# Patient Record
Sex: FEMALE | Race: WHITE | NOT HISPANIC OR LATINO | Employment: UNEMPLOYED | URBAN - METROPOLITAN AREA
[De-identification: names, ages, dates, MRNs, and addresses within clinical notes are randomized per-mention and may not be internally consistent; named-entity substitution may affect disease eponyms.]

---

## 2018-03-31 ENCOUNTER — OFFICE VISIT (OUTPATIENT)
Dept: URGENT CARE | Facility: URGENT CARE | Age: 28
End: 2018-03-31
Payer: MEDICAID

## 2018-03-31 VITALS
DIASTOLIC BLOOD PRESSURE: 80 MMHG | TEMPERATURE: 97.6 F | BODY MASS INDEX: 52.35 KG/M2 | WEIGHT: 293 LBS | OXYGEN SATURATION: 96 % | HEART RATE: 82 BPM | SYSTOLIC BLOOD PRESSURE: 131 MMHG

## 2018-03-31 DIAGNOSIS — J40 BRONCHITIS: ICD-10-CM

## 2018-03-31 DIAGNOSIS — R31.9 URINARY TRACT INFECTION WITH HEMATURIA, SITE UNSPECIFIED: ICD-10-CM

## 2018-03-31 DIAGNOSIS — N39.0 URINARY TRACT INFECTION WITH HEMATURIA, SITE UNSPECIFIED: ICD-10-CM

## 2018-03-31 DIAGNOSIS — R30.0 DYSURIA: Primary | ICD-10-CM

## 2018-03-31 LAB
ALBUMIN UR-MCNC: NEGATIVE MG/DL
APPEARANCE UR: ABNORMAL
BACTERIA #/AREA URNS HPF: ABNORMAL /HPF
BILIRUB UR QL STRIP: NEGATIVE
COLOR UR AUTO: YELLOW
GLUCOSE UR STRIP-MCNC: NEGATIVE MG/DL
HGB UR QL STRIP: NEGATIVE
KETONES UR STRIP-MCNC: NEGATIVE MG/DL
LEUKOCYTE ESTERASE UR QL STRIP: ABNORMAL
NITRATE UR QL: NEGATIVE
NON-SQ EPI CELLS #/AREA URNS LPF: ABNORMAL /LPF
PH UR STRIP: 5.5 PH (ref 5–7)
RBC #/AREA URNS AUTO: ABNORMAL /HPF
SOURCE: ABNORMAL
SP GR UR STRIP: >1.03 (ref 1–1.03)
UROBILINOGEN UR STRIP-ACNC: 0.2 EU/DL (ref 0.2–1)
WBC #/AREA URNS AUTO: ABNORMAL /HPF

## 2018-03-31 PROCEDURE — 99214 OFFICE O/P EST MOD 30 MIN: CPT | Performed by: FAMILY MEDICINE

## 2018-03-31 PROCEDURE — 81001 URINALYSIS AUTO W/SCOPE: CPT | Performed by: FAMILY MEDICINE

## 2018-03-31 RX ORDER — CIPROFLOXACIN 250 MG/1
250 TABLET, FILM COATED ORAL 2 TIMES DAILY
Qty: 14 TABLET | Refills: 0 | Status: SHIPPED | OUTPATIENT
Start: 2018-03-31 | End: 2018-04-07

## 2018-03-31 NOTE — MR AVS SNAPSHOT
"              After Visit Summary   3/31/2018    Cheryle Vazquez    MRN: [id number]           Patient Information     Date Of Birth          1990        Visit Information        Provider Department      3/31/2018 1:30 PM Suhas Romo,  [facility]        Today's Diagnoses     Dysuria    -  1    Urinary tract infection with hematuria, site unspecified        Bronchitis           Follow-ups after your visit        Who to contact     If you have questions or need follow up information about today's clinic visit or your schedule please contact [facility] directly at [phone].  Normal or non-critical lab and imaging results will be communicated to you by [employer], letter or phone within 4 business days after the clinic has received the results. If you do not hear from us within 7 days, please contact the clinic through [employer] or phone. If you have a critical or abnormal lab result, we will notify you by phone as soon as possible.  Submit refill requests through [employer] or call your pharmacy and they will forward the refill request to us. Please allow 3 business days for your refill to be completed.          Additional Information About Your Visit        MyChart Information     [employer] lets you send messages to your doctor, view your test results, renew your prescriptions, schedule appointments and more. To sign up, go to [email or web address] . Click on \"Log in\" on the left side of the screen, which will take you to the Welcome page. Then click on \"Sign up Now\" on the right side of the page.     You will be asked to enter the access code listed below, as well as some personal information. Please follow the directions to create your username and password.     Your access code is: [email or web address]  Expires: 2018  2:27 PM     Your access code will  in 90 days. If you need help or a new code, please call your [place] clinic or " [phone].        Care EveryWhere ID     This is your Care EveryWhere ID. This could be used by other organizations to access your [place] medical records  [id number]        Your Vitals Were     Pulse Temperature Pulse Oximetry Breastfeeding? BMI (Body Mass Index)       82 97.6  F (36.4  C) (Oral) 96% No 52.35 kg/m2        Blood Pressure from Last 3 Encounters:   03/31/18 131/80   01/24/15 100/64   10/22/14 116/70    Weight from Last 3 Encounters:   03/31/18 305 lb (138.3 kg)   01/24/15 262 lb 8 oz (119.1 kg)   10/22/14 258 lb (117 kg)              We Performed the Following     UA with Microscopic reflex to Culture          Today's Medication Changes          These changes are accurate as of 3/31/18  2:27 PM.  If you have any questions, ask your nurse or doctor.               Start taking these medicines.        Dose/Directions    ciprofloxacin 250 MG tablet   Commonly known as:  CIPRO   Used for:  Urinary tract infection with hematuria, site unspecified   Started by:  Suhas Romo DO        Dose:  250 mg   Take 1 tablet (250 mg) by mouth 2 times daily for 7 days   Quantity:  14 tablet   Refills:  0         Stop taking these medicines if you haven't already. Please contact your care team if you have questions.     CONCERTA 36 MG CR tablet   Generic drug:  methylphenidate ER   Stopped by:  Suhas Romo DO           sulfamethoxazole-trimethoprim 800-160 MG per tablet   Commonly known as:  BACTRIM DS/SEPTRA DS   Stopped by:  Suhas Romo DO           valACYclovir 1000 mg tablet   Commonly known as:  VALTREX   Stopped by:  Suhas Romo DO                Where to get your medicines      These medications were sent to [facility] [id number] [place] [address] - [address] AT [facility]  [address], [facility] [address]     Phone:  [phone]     ciprofloxacin 250 MG tablet                Primary Care Provider Office Phone # Fax #    [facility] [phone]       [address]  [facility] [address]        Equal Access to Services     MAITE CABA : Hadii aad ku hadleonidas Otto, chidi brainlionel, marleny kamehul cox, irena martinez bryan mullen. So [facility] [phone].    ATENCIÓN: Si habla español, tiene a bear disposición servicios gratuitos de asistencia lingüística. Llame al [phone].    We comply with applicable federal civil rights laws and [place] laws. We do not discriminate on the basis of race, color, national origin, age, disability, sex, sexual orientation, or gender identity.            Thank you!     Thank you for choosing [facility]  for your care. Our goal is always to provide you with excellent care. Hearing back from our patients is one way we can continue to improve our services. Please take a few minutes to complete the written survey that you may receive in the mail after your visit with us. Thank you!             Your Updated Medication List - Protect others around you: Learn how to safely use, store and throw away your medicines at www.disposemymeds.org.          This list is accurate as of 3/31/18  2:27 PM.  Always use your most recent med list.                   Brand Name Dispense Instructions for use Diagnosis    ciprofloxacin 250 MG tablet    CIPRO    14 tablet    Take 1 tablet (250 mg) by mouth 2 times daily for 7 days    Urinary tract infection with hematuria, site unspecified       MIRENA (52 MG) 20 MCG/24HR IUD   Generic drug:  levonorgestrel      1 each by Intrauterine route once

## 2018-04-12 ENCOUNTER — OFFICE VISIT (OUTPATIENT)
Dept: OBGYN | Facility: CLINIC | Age: 28
End: 2018-04-12
Payer: MEDICAID

## 2018-04-12 VITALS
HEIGHT: 64 IN | DIASTOLIC BLOOD PRESSURE: 64 MMHG | BODY MASS INDEX: 50.02 KG/M2 | WEIGHT: 293 LBS | SYSTOLIC BLOOD PRESSURE: 110 MMHG | TEMPERATURE: 98.2 F

## 2018-04-12 DIAGNOSIS — Z30.430 ENCOUNTER FOR INSERTION OF MIRENA IUD: Primary | ICD-10-CM

## 2018-04-12 DIAGNOSIS — Z12.4 CERVICAL CANCER SCREENING: ICD-10-CM

## 2018-04-12 DIAGNOSIS — Z30.432 ENCOUNTER FOR IUD REMOVAL: ICD-10-CM

## 2018-04-12 DIAGNOSIS — Z11.3 ROUTINE SCREENING FOR STI (SEXUALLY TRANSMITTED INFECTION): ICD-10-CM

## 2018-04-12 PROCEDURE — G0145 SCR C/V CYTO,THINLAYER,RESCR: HCPCS | Performed by: ADVANCED PRACTICE MIDWIFE

## 2018-04-12 PROCEDURE — 58301 REMOVE INTRAUTERINE DEVICE: CPT | Performed by: ADVANCED PRACTICE MIDWIFE

## 2018-04-12 PROCEDURE — 87591 N.GONORRHOEAE DNA AMP PROB: CPT | Performed by: ADVANCED PRACTICE MIDWIFE

## 2018-04-12 PROCEDURE — 58300 INSERT INTRAUTERINE DEVICE: CPT | Performed by: ADVANCED PRACTICE MIDWIFE

## 2018-04-12 PROCEDURE — 87491 CHLMYD TRACH DNA AMP PROBE: CPT | Performed by: ADVANCED PRACTICE MIDWIFE

## 2018-04-12 NOTE — LETTER
April 20, 2018      Cheryle Vazquez  [address]  [place] [address]    Dear MsTripgreg,      I am happy to inform you that your recent cervical cancer screening test (PAP smear) was normal.      Preventative screenings such as this help to ensure your health for years to come. You should repeat a pap smear in 3 years, unless otherwise directed.      You will still need to return to the clinic every year for your annual exam and other preventive tests.     Please contact the clinic at [phone] if you have further questions.       Sincerely,      KENNA Reece CNM/javier

## 2018-04-12 NOTE — PROGRESS NOTES
INDICATIONS:                                                      Is a pregnancy test required: No.  Was a consent obtained?  Yes    Cheryle Vazquez is a 28 year old female,, No LMP recorded. Patient is not currently having periods (Reason: IUD). who presents today for IUD removal and insertion of a new IUD. Her current IUD was placed 5 years ago in May. She has not had problems with the IUD. She requests removal of the IUD because the IUD effectiveness has     The risks, benefits and alternatives of IUD insertion were discussed in detail previously. She also has reviewed the product brochure.  She has elected to go ahead with the removal and insertion of the new IUD today and her questions were answered. Consent was signed. Her LMP began on unknown does not bleed on Mirena and was normal in duration and amount of flow. A pregnancy test was performed today:  No    Today's PHQ-2 Score: No flowsheet data found.    PROCEDURE:                                                      A speculum exam was performed and the cervix was visualized. The IUD string was visualized. Using ring forceps, the string  was grasped and the IUD removed intact.    The pelvic exam revealed normal external genitalia. On bimanual exam the uterus was Anteverted and normal in size with no tenderness present. A speculum was inserted into the vagina and the cervix was visualized. The cervix was prepped with Betadine . The uterus sounded to 8 cm. A Mirena IUD was then inserted without difficulty. The string was cut to 3 cm.  The patient experienced a mild amount of cramping.      POST PROCEDURE:                                                      The patient tolerated the procedure well. Patient was discharged in stable condition.    Call if bleeding, pain or fever occur.    KENNA Reece    (Z30.430) Encounter for insertion of mirena IUD  (primary encounter diagnosis)  Plan: HC LEVONORGESTREL IU 52MG 5 YR,  levonorgestrel         (MIRENA) 20 MCG/24HR IUD, INSERTION         INTRAUTERINE DEVICE        Tolerated well     (Z30.432) Encounter for IUD removal  Plan: REMOVE INTRAUTERINE DEVICE        Tolerated well    (Z11.3) Routine screening for STI (sexually transmitted infection)  Comment: collected  Plan: Chlamydia trachomatis PCR, Neisseria         gonorrhoeae PCR        Results pending     (Z12.4) Cervical cancer screening  Comment: collected  Plan: PAP imaged thin layer screen        Results pending

## 2018-04-12 NOTE — MR AVS SNAPSHOT
"              After Visit Summary   4/12/2018    Cheryle Vazquez    MRN: [id number]           Patient Information     Date Of Birth          1990        Visit Information        Provider Department      4/12/2018 2:30 PM Minnie Arriaga APRN CNM [facility]        Today's Diagnoses     Encounter for insertion of mirena IUD    -  1    Encounter for IUD removal        Routine screening for STI (sexually transmitted infection)        Cervical cancer screening           Follow-ups after your visit        Follow-up notes from your care team     Return in about 1 month (around 5/12/2018), or if symptoms worsen or fail to improve.      Who to contact     If you have questions or need follow up information about today's clinic visit or your schedule please contact [facility] directly at [phone].  Normal or non-critical lab and imaging results will be communicated to you by MyChart, letter or phone within 4 business days after the clinic has received the results. If you do not hear from us within 7 days, please contact the clinic through MyChart or phone. If you have a critical or abnormal lab result, we will notify you by phone as soon as possible.  Submit refill requests through [employer] or call your pharmacy and they will forward the refill request to us. Please allow 3 business days for your refill to be completed.          Additional Information About Your Visit        MyChart Information     [employer] lets you send messages to your doctor, view your test results, renew your prescriptions, schedule appointments and more. To sign up, go to [email or web address] . Click on \"Log in\" on the left side of the screen, which will take you to the Welcome page. Then click on \"Sign up Now\" on the right side of the page.     You will be asked to enter the access code listed below, as well as some personal information. Please follow the directions to create your username and " "password.     Your access code is: [email or web address]  Expires: 2018  2:27 PM     Your access code will  in 90 days. If you need help or a new code, please call your [facility] or [phone].        Care EveryWhere ID     This is your Care EveryWhere ID. This could be used by other organizations to access your [facility] medical records  [id number]        Your Vitals Were     Temperature Height BMI (Body Mass Index)             98.2  F (36.8  C) (Oral) 5' 4\" (1.626 m) 53.62 kg/m2          Blood Pressure from Last 3 Encounters:   18 110/64   18 131/80   01/24/15 100/64    Weight from Last 3 Encounters:   18 312 lb 6.4 oz (141.7 kg)   18 305 lb (138.3 kg)   01/24/15 262 lb 8 oz (119.1 kg)              We Performed the Following     Chlamydia trachomatis PCR     HC LEVONORGESTREL IU 52MG 5 YR     INSERTION INTRAUTERINE DEVICE     Neisseria gonorrhoeae PCR     PAP imaged thin layer screen     REMOVE INTRAUTERINE DEVICE          Today's Medication Changes          These changes are accurate as of 18  4:28 PM.  If you have any questions, ask your nurse or doctor.               These medicines have changed or have updated prescriptions.        Dose/Directions    * MIRENA (52 MG) 20 MCG/24HR IUD   This may have changed:  Another medication with the same name was added. Make sure you understand how and when to take each.   Generic drug:  levonorgestrel   Changed by:  Minnie Arriaga APRN CNM        Dose:  1 each   1 each by Intrauterine route once   Refills:  0       * levonorgestrel 20 MCG/24HR IUD   Commonly known as:  MIRENA   This may have changed:  You were already taking a medication with the same name, and this prescription was added. Make sure you understand how and when to take each.   Used for:  Encounter for insertion of mirena IUD   Changed by:  Minnie Arriaga APRN CNM        Dose:  1 each   1 each (20 mcg) by Intrauterine route continuous   Refills:  0       * " Notice:  This list has 2 medication(s) that are the same as other medications prescribed for you. Read the directions carefully, and ask your doctor or other care provider to review them with you.         Where to get your medicines      Some of these will need a paper prescription and others can be bought over the counter.  Ask your nurse if you have questions.     You don't need a prescription for these medications     levonorgestrel 20 MCG/24HR IUD                Primary Care Provider Office Phone # Fax #    [facility] [phone] [address]  [place] [address]        Equal Access to Services     [facility]: Hadii shakira hess hadasho Sosaul, waaxda luqadaha, qaybta kaalmada adedenver, irena adams . So [facility] [phone].    ATENCIÓN: Si habla español, tiene a bear disposición servicios gratuitos de asistencia lingüística. Ll[facility] [phone].    We comply with applicable federal civil rights laws and [place] laws. We do not discriminate on the basis of race, color, national origin, age, disability, sex, sexual orientation, or gender identity.            Thank you!     Thank you for choosing [facility]  for your care. Our goal is always to provide you with excellent care. Hearing back from our patients is one way we can continue to improve our services. Please take a few minutes to complete the written survey that you may receive in the mail after your visit with us. Thank you!             Your Updated Medication List - Protect others around you: Learn how to safely use, store and throw away your medicines at www.disposemymeds.org.          This list is accurate as of 4/12/18  4:28 PM.  Always use your most recent med list.                   Brand Name Dispense Instructions for use Diagnosis    * MIRENA (52 MG) 20 MCG/24HR IUD   Generic drug:  levonorgestrel      1 each by Intrauterine route once        *  levonorgestrel 20 MCG/24HR IUD    MIRENA     1 each (20 mcg) by Intrauterine route continuous    Encounter for insertion of mirena IUD       * Notice:  This list has 2 medication(s) that are the same as other medications prescribed for you. Read the directions carefully, and ask your doctor or other care provider to review them with you.

## 2018-04-12 NOTE — NURSING NOTE
"Chief Complaint   Patient presents with     Contraception     Remove IUD placed 2013 Replace Mirena       Initial /64 (BP Location: Left arm, Patient Position: Chair, Cuff Size: Adult Large)  Temp 98.2  F (36.8  C) (Oral)  Ht 5' 4\" (1.626 m)  Wt 312 lb 6.4 oz (141.7 kg)  BMI 53.62 kg/m2 Estimated body mass index is 53.62 kg/(m^2) as calculated from the following:    Height as of this encounter: 5' 4\" (1.626 m).    Weight as of this encounter: 312 lb 6.4 oz (141.7 kg).  BP completed using cuff size: large        The following HM Due: pap smear        The following patient reported/Care Every where data was sent to:  P ABSTRACT QUALITY INITIATIVES [02224]        patient has appointment for today              "

## 2018-04-12 NOTE — LETTER
[facility]  [address], [address]  [place] [address]  [phone]        April 16, 2018    Cheryle Vazquez  [address]  [place] [address]            Dear Ms. Cheryle Vazquez:      The results of your recent GC Chlamydia were NORMAL.   Results for orders placed or performed in visit on 04/12/18   Chlamydia trachomatis PCR   Result Value Ref Range    Specimen Description Vagina     Chlamydia Trachomatis PCR Negative NEG^Negative   Neisseria gonorrhoeae PCR   Result Value Ref Range    Specimen Descrip Vagina     N Gonorrhea PCR Negative NEG^Negative      If you have any further questions or problems, please contact our office.    Sincerely,      KENNA Tom CNM

## 2018-04-13 ENCOUNTER — OFFICE VISIT (OUTPATIENT)
Dept: URGENT CARE | Facility: URGENT CARE | Age: 28
End: 2018-04-13
Payer: MEDICAID

## 2018-04-13 VITALS
HEART RATE: 80 BPM | TEMPERATURE: 97 F | BODY MASS INDEX: 53.19 KG/M2 | DIASTOLIC BLOOD PRESSURE: 71 MMHG | SYSTOLIC BLOOD PRESSURE: 108 MMHG | OXYGEN SATURATION: 96 % | RESPIRATION RATE: 18 BRPM | WEIGHT: 293 LBS

## 2018-04-13 DIAGNOSIS — R05.8 PRODUCTIVE COUGH: ICD-10-CM

## 2018-04-13 DIAGNOSIS — J45.909 REACTIVE AIRWAY DISEASE WITHOUT COMPLICATION, UNSPECIFIED ASTHMA SEVERITY, UNSPECIFIED WHETHER PERSISTENT: Primary | ICD-10-CM

## 2018-04-13 LAB
C TRACH DNA SPEC QL NAA+PROBE: NEGATIVE
N GONORRHOEA DNA SPEC QL NAA+PROBE: NEGATIVE
SPECIMEN SOURCE: NORMAL
SPECIMEN SOURCE: NORMAL

## 2018-04-13 PROCEDURE — 99214 OFFICE O/P EST MOD 30 MIN: CPT | Performed by: FAMILY MEDICINE

## 2018-04-13 RX ORDER — ALBUTEROL SULFATE 90 UG/1
2 AEROSOL, METERED RESPIRATORY (INHALATION) 4 TIMES DAILY PRN
Qty: 1 INHALER | Refills: 0 | Status: SHIPPED | OUTPATIENT
Start: 2018-04-13 | End: 2019-05-07

## 2018-04-13 RX ORDER — CEFUROXIME AXETIL 500 MG/1
500 TABLET ORAL 2 TIMES DAILY
Qty: 20 TABLET | Refills: 0 | Status: SHIPPED | OUTPATIENT
Start: 2018-04-13 | End: 2018-04-23

## 2018-04-13 RX ORDER — PREDNISONE 20 MG/1
20 TABLET ORAL 2 TIMES DAILY
Qty: 10 TABLET | Refills: 0 | Status: SHIPPED | OUTPATIENT
Start: 2018-04-13 | End: 2018-04-18

## 2018-04-13 NOTE — MR AVS SNAPSHOT
"              After Visit Summary   2018    Cheryle Vazquez    MRN: [id number]           Patient Information     Date Of Birth          1990        Visit Information        Provider Department      2018 9:40 AM Suhas Romo DO [facility]        Today's Diagnoses     Reactive airway disease without complication, unspecified asthma severity, unspecified whether persistent    -  1    Productive cough           Follow-ups after your visit        Who to contact     If you have questions or need follow up information about today's clinic visit or your schedule please contact [facility] directly at [phone].  Normal or non-critical lab and imaging results will be communicated to you by MyChart, letter or phone within 4 business days after the clinic has received the results. If you do not hear from us within 7 days, please contact the clinic through MyChart or phone. If you have a critical or abnormal lab result, we will notify you by phone as soon as possible.  Submit refill requests through [employer] or call your pharmacy and they will forward the refill request to us. Please allow 3 business days for your refill to be completed.          Additional Information About Your Visit        MyChart Information     [employer] lets you send messages to your doctor, view your test results, renew your prescriptions, schedule appointments and more. To sign up, go to [email or web address] . Click on \"Log in\" on the left side of the screen, which will take you to the Welcome page. Then click on \"Sign up Now\" on the right side of the page.     You will be asked to enter the access code listed below, as well as some personal information. Please follow the directions to create your username and password.     Your access code is: [email or web address]  Expires: 2018  2:27 PM     Your access code will  in 90 days. If you need help or a new code, please " call your [place] clinic or [phone].        Care EveryWhere ID     This is your Care EveryWhere ID. This could be used by other organizations to access your [facility] medical records  [id number]        Your Vitals Were     Pulse Temperature Respirations Pulse Oximetry BMI (Body Mass Index)       80 97  F (36.1  C) (Oral) 18 96% 53.19 kg/m2        Blood Pressure from Last 3 Encounters:   04/13/18 108/71   04/12/18 110/64   03/31/18 131/80    Weight from Last 3 Encounters:   04/13/18 309 lb 14.4 oz (140.6 kg)   04/12/18 312 lb 6.4 oz (141.7 kg)   03/31/18 305 lb (138.3 kg)              Today, you had the following     No orders found for display         Today's Medication Changes          These changes are accurate as of 4/13/18 10:08 AM.  If you have any questions, ask your nurse or doctor.               Start taking these medicines.        Dose/Directions    albuterol 108 (90 Base) MCG/ACT Inhaler   Commonly known as:  PROAIR HFA   Used for:  Reactive airway disease without complication, unspecified asthma severity, unspecified whether persistent   Started by:  Suhas Romo DO        Dose:  2 puff   Inhale 2 puffs into the lungs 4 times daily as needed for shortness of breath / dyspnea or wheezing   Quantity:  1 Inhaler   Refills:  0       cefuroxime 500 MG tablet   Commonly known as:  CEFTIN   Used for:  Productive cough   Started by:  Suhas Romo DO        Dose:  500 mg   Take 1 tablet (500 mg) by mouth 2 times daily for 10 days   Quantity:  20 tablet   Refills:  0       predniSONE 20 MG tablet   Commonly known as:  DELTASONE   Used for:  Reactive airway disease without complication, unspecified asthma severity, unspecified whether persistent   Started by:  Suhas Romo DO        Dose:  20 mg   Take 1 tablet (20 mg) by mouth 2 times daily for 5 days   Quantity:  10 tablet   Refills:  0            Where to get your medicines      These medications were sent to [id number] -  [place], [place] - [address] AT [facility] [address]  [address], [facility] [address]     Phone:  [phone]     albuterol 108 (90 Base) MCG/ACT Inhaler    cefuroxime 500 MG tablet    predniSONE 20 MG tablet                Primary Care Provider Office Phone # Fax #    [facility] [phone] [address]  [place] [address]        Equal Access to Services     COLLINS CABA : Hadii aad ku hadasho Soomaali, waaxda luqadaha, qaybta kaalmada adeegyada, waxay idiin hayaan adeeg kharaseverino adams . So [facility] [phone].    ATENCIÓN: Si habla español, tiene a bear disposición servicios gratuitos de asistencia lingüística. Ll[facility] [phone].    We comply with applicable federal civil rights laws and [place] laws. We do not discriminate on the basis of race, color, national origin, age, disability, sex, sexual orientation, or gender identity.            Thank you!     Thank you for choosing [facility]  for your care. Our goal is always to provide you with excellent care. Hearing back from our patients is one way we can continue to improve our services. Please take a few minutes to complete the written survey that you may receive in the mail after your visit with us. Thank you!             Your Updated Medication List - Protect others around you: Learn how to safely use, store and throw away your medicines at www.disposemymeds.org.          This list is accurate as of 4/13/18 10:08 AM.  Always use your most recent med list.                   Brand Name Dispense Instructions for use Diagnosis    albuterol 108 (90 Base) MCG/ACT Inhaler    PROAIR HFA    1 Inhaler    Inhale 2 puffs into the lungs 4 times daily as needed for shortness of breath / dyspnea or wheezing    Reactive airway disease without complication, unspecified asthma severity, unspecified whether persistent       cefuroxime 500 MG tablet     CEFTIN    20 tablet    Take 1 tablet (500 mg) by mouth 2 times daily for 10 days    Productive cough       * MIRENA (52 MG) 20 MCG/24HR IUD   Generic drug:  levonorgestrel      1 each by Intrauterine route once        * levonorgestrel 20 MCG/24HR IUD    MIRENA     1 each (20 mcg) by Intrauterine route continuous    Encounter for insertion of mirena IUD       predniSONE 20 MG tablet    DELTASONE    10 tablet    Take 1 tablet (20 mg) by mouth 2 times daily for 5 days    Reactive airway disease without complication, unspecified asthma severity, unspecified whether persistent       * Notice:  This list has 2 medication(s) that are the same as other medications prescribed for you. Read the directions carefully, and ask your doctor or other care provider to review them with you.

## 2018-04-13 NOTE — PROGRESS NOTES
SUBJECTIVE: Cheryle Vazquez is a 28 year old female presenting with a chief complaint of nasal congestion and cough .  Onset of symptoms was 2 week(s) ago.  Course of illness is same.    Severity moderate  Current and Associated symptoms: runny nose, stuffy nose and cough - productive  Treatment measures tried include Tylenol/Ibuprofen.  Predisposing factors include possible hx of asthma.    Past Medical History:   Diagnosis Date     ADHD (attention deficit hyperactivity disorder)      Depressive disorder        Past Surgical History:   Procedure Laterality Date     LIGATE ARTERY ETHMOID  2008       Family History   Problem Relation Age of Onset     Hypertension Paternal Grandmother      Asthma Mother        Social History   Substance Use Topics     Smoking status: Former Smoker     Packs/day: 0.25     Years: 0.50     Types: Cigarettes     Smokeless tobacco: Never Used      Comment: Trying to stop---Hasn't smoked for a week.     Alcohol use No     ROS:  SKIN: no rash  GI: no vomiting    OBJECTIVE:  /71  Pulse 80  Temp 97  F (36.1  C) (Oral)  Resp 18  Wt 309 lb 14.4 oz (140.6 kg)  SpO2 96%  BMI 53.19 kg/m2   GENERAL APPEARANCE: healthy, alert and no distress  EYES: EOMI,  PERRL, conjunctiva clear  HENT: ear canals and TM's normal.  Nose and mouth without ulcers, erythema or lesions  NECK: supple, nontender, no lymphadenopathy  RESP: lungs clear to auscultation - no rales, rhonchi or wheezes  CV: regular rates and rhythm, normal S1 S2, no murmur noted  SKIN: no suspicious lesions or rashes      ICD-10-CM    1. Reactive airway disease without complication, unspecified asthma severity, unspecified whether persistent J45.909 predniSONE (DELTASONE) 20 MG tablet     albuterol (PROAIR HFA) 108 (90 Base) MCG/ACT Inhaler   2. Productive cough R05 cefuroxime (CEFTIN) 500 MG tablet       Fluids/Rest, f/u if worse/not any better

## 2018-04-16 LAB
COPATH REPORT: NORMAL
PAP: NORMAL

## 2018-04-16 NOTE — PROGRESS NOTES
SUBJECTIVE: Cheryle Vazquez is a 28 year old female presenting with a chief complaint of nasal congestion, cough  and abd pain.  Onset of symptoms was day(s) ago.  Course of illness is same.    Severity moderate  Current and Associated symptoms: stuffy nose and cough - non-productive  Treatment measures tried include Tylenol/Ibuprofen.  Predisposing factors include None.    Past Medical History:   Diagnosis Date     ADHD (attention deficit hyperactivity disorder)      Depressive disorder        Past Surgical History:   Procedure Laterality Date     LIGATE ARTERY ETHMOID  2008       Family History   Problem Relation Age of Onset     Hypertension Paternal Grandmother      Asthma Mother        Social History   Substance Use Topics     Smoking status: Former Smoker     Packs/day: 0.25     Years: 0.50     Types: Cigarettes     Smokeless tobacco: Never Used      Comment: Trying to stop---Hasn't smoked for a week.     Alcohol use No     ROS:  SKIN: no rash  GI: no vomiting    OBJECTIVE:  /80  Pulse 82  Temp 97.6  F (36.4  C) (Oral)  Wt 305 lb (138.3 kg)  SpO2 96%  Breastfeeding? No  BMI 52.35 kg/m2   GENERAL APPEARANCE: healthy, alert and no distress  EYES: EOMI,  PERRL, conjunctiva clear  HENT: ear canals and TM's normal.  Nose and mouth without ulcers, erythema or lesions  NECK: supple, nontender, no lymphadenopathy  RESP: lungs clear to auscultation - no rales, rhonchi or wheezes  ABDOMEN:  soft, nontender, no HSM or masses and bowel sounds normal  SKIN: no suspicious lesions or rashes      ICD-10-CM    1. Dysuria R30.0 UA with Microscopic reflex to Culture   2. Urinary tract infection with hematuria, site unspecified N39.0 ciprofloxacin (CIPRO) 250 MG tablet    R31.9    3. Bronchitis J40        Fluids/Rest, f/u if worse/not any better

## 2018-10-12 ENCOUNTER — OFFICE VISIT (OUTPATIENT)
Dept: URGENT CARE | Facility: URGENT CARE | Age: 28
End: 2018-10-12
Payer: COMMERCIAL

## 2018-10-12 VITALS
OXYGEN SATURATION: 97 % | DIASTOLIC BLOOD PRESSURE: 66 MMHG | HEART RATE: 76 BPM | SYSTOLIC BLOOD PRESSURE: 112 MMHG | TEMPERATURE: 97.3 F | RESPIRATION RATE: 18 BRPM

## 2018-10-12 DIAGNOSIS — R30.0 DYSURIA: Primary | ICD-10-CM

## 2018-10-12 LAB
ALBUMIN UR-MCNC: NEGATIVE MG/DL
APPEARANCE UR: CLEAR
BACTERIA #/AREA URNS HPF: ABNORMAL /HPF
BILIRUB UR QL STRIP: NEGATIVE
COLOR UR AUTO: YELLOW
GLUCOSE UR STRIP-MCNC: NEGATIVE MG/DL
HGB UR QL STRIP: NEGATIVE
KETONES UR STRIP-MCNC: NEGATIVE MG/DL
LEUKOCYTE ESTERASE UR QL STRIP: ABNORMAL
MUCOUS THREADS #/AREA URNS LPF: PRESENT /LPF
NITRATE UR QL: NEGATIVE
NON-SQ EPI CELLS #/AREA URNS LPF: ABNORMAL /LPF
PH UR STRIP: 6.5 PH (ref 5–7)
RBC #/AREA URNS AUTO: ABNORMAL /HPF
SOURCE: ABNORMAL
SP GR UR STRIP: 1.02 (ref 1–1.03)
URNS CMNT MICRO: ABNORMAL
UROBILINOGEN UR STRIP-ACNC: 0.2 EU/DL (ref 0.2–1)
WBC #/AREA URNS AUTO: ABNORMAL /HPF

## 2018-10-12 PROCEDURE — 99213 OFFICE O/P EST LOW 20 MIN: CPT | Performed by: FAMILY MEDICINE

## 2018-10-12 PROCEDURE — 81001 URINALYSIS AUTO W/SCOPE: CPT | Performed by: FAMILY MEDICINE

## 2018-10-12 RX ORDER — NITROFURANTOIN 25; 75 MG/1; MG/1
100 CAPSULE ORAL 2 TIMES DAILY
Qty: 10 CAPSULE | Refills: 0 | Status: SHIPPED | OUTPATIENT
Start: 2018-10-12 | End: 2018-10-17

## 2018-10-12 NOTE — MR AVS SNAPSHOT
"              After Visit Summary   10/12/2018    Cheryle Vazquez    MRN: [id number]           Patient Information     Date Of Birth          1990        Visit Information        Provider Department      10/12/2018 9:25 AM James Porras MD [facility]        Today's Diagnoses     Dysuria    -  1      Care Instructions    Take medication twice a day for 5 days    Symptoms should improve within the next 2-3 days. If no improvement, call or return for recommendation    Drink at least 8 glasses of water a day    If you develop flank pain, fevers, nausea/vomiting call immediately for assistance              Follow-ups after your visit        Who to contact     If you have questions or need follow up information about today's clinic visit or your schedule please contact [facility] directly at [phone].  Normal or non-critical lab and imaging results will be communicated to you by Dealstreethart, letter or phone within 4 business days after the clinic has received the results. If you do not hear from us within 7 days, please contact the clinic through MyChart or phone. If you have a critical or abnormal lab result, we will notify you by phone as soon as possible.  Submit refill requests through light or call your pharmacy and they will forward the refill request to us. Please allow 3 business days for your refill to be completed.          Additional Information About Your Visit        MyChart Information     light lets you send messages to your doctor, view your test results, renew your prescriptions, schedule appointments and more. To sign up, go to [email or web address] . Click on \"Log in\" on the left side of the screen, which will take you to the Welcome page. Then click on \"Sign up Now\" on the right side of the page.     You will be asked to enter the access code listed below, as well as some personal information. Please follow the " directions to create your username and password.     Your access code is: [email or web address]  Expires: 1/10/2019 10:41 AM     Your access code will  in 90 days. If you need help or a new code, please call your [facility] or [phone].        Care EveryWhere ID     This is your Care EveryWhere ID. This could be used by other organizations to access your [facility] medical records  [id number]        Your Vitals Were     Pulse Temperature Respirations Pulse Oximetry          76 97.3  F (36.3  C) (Tympanic) 18 97%         Blood Pressure from Last 3 Encounters:   10/12/18 112/66   18 108/71   18 110/64    Weight from Last 3 Encounters:   18 309 lb 14.4 oz (140.6 kg)   18 312 lb 6.4 oz (141.7 kg)   18 305 lb (138.3 kg)              We Performed the Following     UA with Microscopic reflex to Culture          Today's Medication Changes          These changes are accurate as of 10/12/18 10:41 AM.  If you have any questions, ask your nurse or doctor.               Start taking these medicines.        Dose/Directions    nitroFURantoin (macrocrystal-monohydrate) 100 MG capsule   Commonly known as:  MACROBID   Used for:  Dysuria   Started by:  James Porras MD        Dose:  100 mg   Take 1 capsule (100 mg) by mouth 2 times daily for 5 days   Quantity:  10 capsule   Refills:  0            Where to get your medicines      These medications were sent to [facility] [id number] [place] [address] [address] AT [facility] & [address], [facility] [address]     Phone:  [phone]     nitroFURantoin (macrocrystal-monohydrate) 100 MG capsule                Primary Care Provider Office Phone # Fax #    [facility] [phone] [address] [facility] [address]  [facility] [address]        Equal Access to Services     MAITE CABA AH: Hadii shakira ku hadasho Soomaali, waaxda luqadaha, qaybta kaalmada adeegyada, waxay idiin hayaan adeeg  chinasorinseverino villafuerteaajad ah. So [facility] [phone].    ATENCIÓN: Si anishala jennifer, tiene a bear disposición servicios gratuitos de asistencia lingüística. Rubi vasques [phone].    We comply with applicable federal civil rights laws and [place] laws. We do not discriminate on the basis of race, color, national origin, age, disability, sex, sexual orientation, or gender identity.            Thank you!     Thank you for choosing [facility]  for your care. Our goal is always to provide you with excellent care. Hearing back from our patients is one way we can continue to improve our services. Please take a few minutes to complete the written survey that you may receive in the mail after your visit with us. Thank you!             Your Updated Medication List - Protect others around you: Learn how to safely use, store and throw away your medicines at www.disposemymeds.org.          This list is accurate as of 10/12/18 10:41 AM.  Always use your most recent med list.                   Brand Name Dispense Instructions for use Diagnosis    albuterol 108 (90 Base) MCG/ACT inhaler    PROAIR HFA    1 Inhaler    Inhale 2 puffs into the lungs 4 times daily as needed for shortness of breath / dyspnea or wheezing    Reactive airway disease without complication, unspecified asthma severity, unspecified whether persistent       * MIRENA (52 MG) 20 MCG/24HR IUD   Generic drug:  levonorgestrel      1 each by Intrauterine route once        * levonorgestrel 20 MCG/24HR IUD    MIRENA     1 each (20 mcg) by Intrauterine route continuous    Encounter for insertion of mirena IUD       nitroFURantoin (macrocrystal-monohydrate) 100 MG capsule    MACROBID    10 capsule    Take 1 capsule (100 mg) by mouth 2 times daily for 5 days    Dysuria       * Notice:  This list has 2 medication(s) that are the same as other medications prescribed for you. Read the directions carefully, and ask your doctor or other care provider to review  them with you.

## 2018-10-12 NOTE — PROGRESS NOTES
Subjective:   Cheryle Vazquez is a 28 year old female who presents for   Chief Complaint   Patient presents with     Urgent Care     Uti sxs 4x days      4 days of symptoms: frequent urination, slight burning with urination.   No recent UTI. Has had them in the past. Her water intake is not as good as it should be.   No n/v/d. No fevers. No flank discomfort. Denies T2DM    PMHX/PSHX/MEDS/ALLERGIES/SHX/FHX reviewed in Epic.    Patient Active Problem List    Diagnosis Date Noted     Insertion of mirena IUD 04/12/2018     Priority: Medium     Lot # [id number]  Exp. Date (last month of insertion) 06/2020  NDC # 96088-598-35  REMOVE BY 04/2023         Depressed 05/29/2013     Priority: Medium       Current Outpatient Prescriptions   Medication     levonorgestrel (MIRENA, 52 MG,) 20 MCG/24HR IUD     nitroFURantoin, macrocrystal-monohydrate, (MACROBID) 100 MG capsule     albuterol (PROAIR HFA) 108 (90 Base) MCG/ACT Inhaler     levonorgestrel (MIRENA) 20 MCG/24HR IUD     No current facility-administered medications for this visit.      Facility-Administered Medications Ordered in Other Visits   Medication     fentaNYL (SUBLIMAZE) injection     lidocaine-EPINEPHrine 1.5 %-1:950715 injection     ropivacaine (NAROPIN) epidural       ROS:  As above per HPI    Objective:   /66  Pulse 76  Temp 97.3  F (36.3  C) (Tympanic)  Resp 18  SpO2 97%, There is no height or weight on file to calculate BMI.  Gen:  NAD, well-nourished, sitting in chair comfortably, very pleasant.   HEENT: EOMI, sclera anicteric, Head normocephalic, ; nares patent; moist mucous membranes  Neck: trachea midline, no thyromegaly  CV:  Hemodynamically stable  Pulm:  no increased work of breathing   ABD: soft, non-distended  Extrem: no cyanosis, edema or clubbing  Skin: no obvious rashes or abnormalities  Psych: Euthymic, linear thoughts, normal rate of speech    Results for orders placed or performed in visit on 10/12/18   UA with Microscopic reflex  to Culture   Result Value Ref Range    Color Urine Yellow     Appearance Urine Clear     Glucose Urine Negative NEG^Negative mg/dL    Bilirubin Urine Negative NEG^Negative    Ketones Urine Negative NEG^Negative mg/dL    Specific Gravity Urine 1.025 1.003 - 1.035    pH Urine 6.5 5.0 - 7.0 pH    Protein Albumin Urine Negative NEG^Negative mg/dL    Urobilinogen Urine 0.2 0.2 - 1.0 EU/dL    Nitrite Urine Negative NEG^Negative    Blood Urine Negative NEG^Negative    Leukocyte Esterase Urine Trace (A) NEG^Negative    Source Midstream Urine     WBC Urine 0 - 5 OTO5^0 - 5 /HPF    RBC Urine 2-5 (A) OTO2^O - 2 /HPF    Squamous Epithelial /LPF Urine Many (A) FEW^Few /LPF    Bacteria Urine Few (A) NEG^Negative /HPF    Mucous Urine Present (A) NEG^Negative /LPF    Comment Urine SHORT SAMPLE MICRO DONE ON UNSPUN URINE      Assessment & Plan:   Cheryle Vazquez, 28 year old female who presents with:  Dysuria  Although UA not convincing for UTI guidelines suggest treating based off symptoms. No signs of pyelonephritis. 5 day macrobid course provided.   - UA with Microscopic reflex to Culture    James Porras MD   [facility]     Options for treatment and/or follow-up care were reviewed with the patient. Cheryle Vazquez and/or legal guardian was engaged and actively involved in the decision making process. Patient/guardian verbalized understanding of the options discussed and was satisfied with the final plan.

## 2018-11-24 ENCOUNTER — OFFICE VISIT (OUTPATIENT)
Dept: URGENT CARE | Facility: URGENT CARE | Age: 28
End: 2018-11-24
Payer: COMMERCIAL

## 2018-11-24 VITALS
TEMPERATURE: 98.9 F | WEIGHT: 293 LBS | HEART RATE: 86 BPM | SYSTOLIC BLOOD PRESSURE: 112 MMHG | DIASTOLIC BLOOD PRESSURE: 68 MMHG | OXYGEN SATURATION: 97 % | BODY MASS INDEX: 56.59 KG/M2

## 2018-11-24 DIAGNOSIS — N39.0 URINARY TRACT INFECTION WITHOUT HEMATURIA, SITE UNSPECIFIED: Primary | ICD-10-CM

## 2018-11-24 DIAGNOSIS — E66.01 MORBID OBESITY (H): ICD-10-CM

## 2018-11-24 DIAGNOSIS — R39.15 URINARY URGENCY: ICD-10-CM

## 2018-11-24 LAB
ALBUMIN UR-MCNC: NEGATIVE MG/DL
APPEARANCE UR: ABNORMAL
BACTERIA #/AREA URNS HPF: ABNORMAL /HPF
BILIRUB UR QL STRIP: NEGATIVE
COLOR UR AUTO: YELLOW
GLUCOSE UR STRIP-MCNC: NEGATIVE MG/DL
HGB UR QL STRIP: ABNORMAL
KETONES UR STRIP-MCNC: NEGATIVE MG/DL
LEUKOCYTE ESTERASE UR QL STRIP: ABNORMAL
NITRATE UR QL: NEGATIVE
NON-SQ EPI CELLS #/AREA URNS LPF: ABNORMAL /LPF
PH UR STRIP: 7 PH (ref 5–7)
RBC #/AREA URNS AUTO: ABNORMAL /HPF
SOURCE: ABNORMAL
SP GR UR STRIP: 1.02 (ref 1–1.03)
UROBILINOGEN UR STRIP-ACNC: 0.2 EU/DL (ref 0.2–1)
WBC #/AREA URNS AUTO: ABNORMAL /HPF

## 2018-11-24 PROCEDURE — 81001 URINALYSIS AUTO W/SCOPE: CPT | Performed by: FAMILY MEDICINE

## 2018-11-24 PROCEDURE — 99213 OFFICE O/P EST LOW 20 MIN: CPT | Performed by: FAMILY MEDICINE

## 2018-11-24 PROCEDURE — 87086 URINE CULTURE/COLONY COUNT: CPT | Performed by: FAMILY MEDICINE

## 2018-11-24 RX ORDER — CEPHALEXIN 500 MG/1
500 CAPSULE ORAL 2 TIMES DAILY
Qty: 20 CAPSULE | Refills: 0 | Status: SHIPPED | OUTPATIENT
Start: 2018-11-24 | End: 2019-05-07

## 2018-11-24 RX ORDER — PHENAZOPYRIDINE HYDROCHLORIDE 200 MG/1
200 TABLET, FILM COATED ORAL 3 TIMES DAILY PRN
Qty: 6 TABLET | Refills: 0 | Status: SHIPPED | OUTPATIENT
Start: 2018-11-24 | End: 2019-05-07

## 2018-11-24 NOTE — PATIENT INSTRUCTIONS
Start  Cephalexin antibiotic for urine    Start pyridium for burning and pain, will turn urine bright orange    If fever, nausea;vomiting flank pain to ER    PREVENTION OF URINARY TRACT INFECTION    AVOID CHEMICAL IRRITTANTS: bath gels,  Perfumed products,  Deoderant pads or tampons, douching    CLOTHING that increases moisture and bacterial growth:  Nylon, lycra, pantihose, pantiliners     AVOID: tight clothing and thongs    ACIDIFY URINE: cranberry tablets instead of cranberry juice (with excess sugar) to acidify urine and decrease bacterial growth.     URINATE after intercourse    FLUIDS: 6-8 glasses water per day

## 2018-11-24 NOTE — PROGRESS NOTES
SUBJECTIVE:   Cheryle Vazquez is a 28 year old female who  presents today for a possible UTI. Symptoms of dysuria, urgency, frequency, burning, hesitancy, suprapubic pain and pressure, back pain, and voiding in small amounts have been going on for 2day(s)  Hematuria no.  sudden onset and worseningand moderate.  There is no history of fever, chills, nausea or vomiting.  No history of vaginaldischarge. This patient does  have a history of urinary tract infections. Patient denies rigors, flank pain, temperature > 101 degrees F. and Vomiting, significant nausea or diarrhea or vaginal discharge, vaginal odor, vaginal itching and dyspareunia (pain in labia/pelvis)     Past Medical History:   Diagnosis Date     ADHD (attention deficit hyperactivity disorder)      Depressive disorder      Current Outpatient Prescriptions   Medication Sig Dispense Refill     albuterol (PROAIR HFA) 108 (90 Base) MCG/ACT Inhaler Inhale 2 puffs into the lungs 4 times daily as needed for shortness of breath / dyspnea or wheezing 1 Inhaler 0     levonorgestrel (MIRENA) 20 MCG/24HR IUD 1 each (20 mcg) by Intrauterine route continuous (Patient not taking: Reported on 4/13/2018)       levonorgestrel (MIRENA, 52 MG,) 20 MCG/24HR IUD 1 each by Intrauterine route once       Social History   Substance Use Topics     Smoking status: Former Smoker     Packs/day: 0.25     Years: 0.50     Types: Cigarettes     Smokeless tobacco: Never Used      Comment: Trying to stop---Hasn't smoked for a week.     Alcohol use No       ROS:   Review of systems negative except as stated above.    OBJECTIVE:  /68  Pulse 86  Temp 98.9  F (37.2  C) (Oral)  Wt 329 lb 11.2 oz (149.6 kg)  SpO2 97%  BMI 56.59 kg/m2  GENERAL APPEARANCE: healthy, alert and no distress  RESP: lungs clear to auscultation - no rales, rhonchi or wheezes  CV: regular rates and rhythm, normal S1 S2, no murmur noted  ABDOMEN:  soft, nontender, no HSM or masses and bowel sounds  normal  BACK: No CVA tenderness  SKIN: no suspicious lesions or rashes  NEURO: Normal strength and tone, sensory exam grossly normal,  normal speech and mentation    ASSESSMENT/PLAN:      ICD-10-CM    1. Urinary tract infection without hematuria, site unspecified N39.0 Urine Culture Aerobic Bacterial     cephALEXin (KEFLEX) 500 MG capsule     phenazopyridine (PYRIDIUM) 200 MG tablet   2. Urinary urgency R39.15 UA with Microscopic reflex to Culture   3. Morbid obesity (H) E66.01        Patient Instructions   Start  Cephalexin antibiotic for urine    Start pyridium for burning and pain, will turn urine bright orange    If fever, nausea;vomiting flank pain to ER    PREVENTION OF URINARY TRACT INFECTION    AVOID CHEMICAL IRRITTANTS: bath gels,  Perfumed products,  Deoderant pads or tampons, douching    CLOTHING that increases moisture and bacterial growth:  Nylon, lycra, pantihose, pantiliners     AVOID: tight clothing and thongs    ACIDIFY URINE: cranberry tablets instead of cranberry juice (with excess sugar) to acidify urine and decrease bacterial growth.     URINATE after intercourse    FLUIDS: 6-8 glasses water per day

## 2018-11-24 NOTE — MR AVS SNAPSHOT
After Visit Summary   11/24/2018    Cheryle Vazquez    MRN: [id number]           Patient Information     Date Of Birth          1990        Visit Information        Provider Department      11/24/2018 2:50 PM Esthela Hughes MD [facility]        Today's Diagnoses     Urinary tract infection without hematuria, site unspecified    -  1    Urinary urgency        Morbid obesity (H)          Care Instructions    Start  Cephalexin antibiotic for urine    Start pyridium for burning and pain, will turn urine bright orange    If fever, nausea;vomiting flank pain to ER    PREVENTION OF URINARY TRACT INFECTION    AVOID CHEMICAL IRRITTANTS: bath gels,  Perfumed products,  Deoderant pads or tampons, douching    CLOTHING that increases moisture and bacterial growth:  Nylon, lycra, pantihose, pantiliners     AVOID: tight clothing and thongs    ACIDIFY URINE: cranberry tablets instead of cranberry juice (with excess sugar) to acidify urine and decrease bacterial growth.     URINATE after intercourse    FLUIDS: 6-8 glasses water per day          Follow-ups after your visit        Who to contact     If you have questions or need follow up information about today's clinic visit or your schedule please contact [facility] directly at [phone].  Normal or non-critical lab and imaging results will be communicated to you by MyChart, letter or phone within 4 business days after the clinic has received the results. If you do not hear from us within 7 days, please contact the clinic through MyChart or phone. If you have a critical or abnormal lab result, we will notify you by phone as soon as possible.  Submit refill requests through Wize or call your pharmacy and they will forward the refill request to us. Please allow 3 business days for your refill to be completed.          Additional Information About Your Visit        MyChart Information      "Tapru lets you send messages to your doctor, view your test results, renew your prescriptions, schedule appointments and more. To sign up, go to [email or web address] . Click on \"Log in\" on the left side of the screen, which will take you to the Welcome page. Then click on \"Sign up Now\" on the right side of the page.     You will be asked to enter the access code listed below, as well as some personal information. Please follow the directions to create your username and password.     Your access code is: [email or web address]  Expires: 1/10/2019  9:41 AM     Your access code will  in 90 days. If you need help or a new code, please call your [facility] clinic or [phone].        Care EveryWhere ID     This is your Care EveryWhere ID. This could be used by other organizations to access your [facility] medical records  [id number]        Your Vitals Were     Pulse Temperature Pulse Oximetry BMI (Body Mass Index)          86 98.9  F (37.2  C) (Oral) 97% 56.59 kg/m2         Blood Pressure from Last 3 Encounters:   18 112/68   10/12/18 112/66   18 108/71    Weight from Last 3 Encounters:   18 329 lb 11.2 oz (149.6 kg)   18 309 lb 14.4 oz (140.6 kg)   18 312 lb 6.4 oz (141.7 kg)              We Performed the Following     UA with Microscopic reflex to Culture     Urine Culture Aerobic Bacterial          Today's Medication Changes          These changes are accurate as of 18  5:13 PM.  If you have any questions, ask your nurse or doctor.               Start taking these medicines.        Dose/Directions    cephALEXin 500 MG capsule   Commonly known as:  KEFLEX   Used for:  Urinary tract infection without hematuria, site unspecified   Started by:  Esthela Hughes MD        Dose:  500 mg   Take 1 capsule (500 mg) by mouth 2 times daily   Quantity:  20 capsule   Refills:  0       phenazopyridine 200 MG tablet   Commonly known as:  PYRIDIUM   Used for:  Urinary tract infection without " hematuria, site unspecified   Started by:  Esthela Hughes MD        Dose:  200 mg   Take 1 tablet (200 mg) by mouth 3 times daily as needed for irritation   Quantity:  6 tablet   Refills:  0            Where to get your medicines      These [facility] to [id number] - [place], [place] - [address] AT [facility] [address], [facility] [address]     Phone:  [phone]     cephALEXin 500 MG capsule    phenazopyridine 200 MG tablet                Primary Care Provider Office Phone # Fax #    [facility] [phone] [address] [facility] [address] [facility] [address]  [place] [address]        Equal Access to Services     MAITE CABA AH: Hadii aad ku hadasho Soomaali, waaxda luqadaha, qaybta kaalmada adeegyada, waxay idiin haybisin courtney mullen. So [facility] [phone].    ATENCIÓN: Si habla español, tiene a bear disposición servicios gratuitos de asistencia lingüística. [facility] [phone].    We comply with applicable federal civil rights laws and [place] laws. We do not discriminate on the basis of race, color, national origin, age, disability, sex, sexual orientation, or gender identity.            Thank you!     Thank you for choosing [facility]  for your care. Our goal is always to provide you with excellent care. Hearing back from our patients is one way we can continue to improve our services. Please take a few minutes to complete the written survey that you may receive in the mail after your visit with us. Thank you!             Your Updated Medication List - Protect others around you: Learn how to safely use, store and throw away your medicines at www.disposemymeds.org.          This list is accurate as of 11/24/18  5:13 PM.  Always use your most recent med list.                   Brand Name Dispense Instructions for use Diagnosis    albuterol 108 (90 Base) MCG/ACT inhaler    PROAIR HFA    1 Inhaler     Inhale 2 puffs into the lungs 4 times daily as needed for shortness of breath / dyspnea or wheezing    Reactive airway disease without complication, unspecified asthma severity, unspecified whether persistent       cephALEXin 500 MG capsule    KEFLEX    20 capsule    Take 1 capsule (500 mg) by mouth 2 times daily    Urinary tract infection without hematuria, site unspecified       * MIRENA (52 MG) 20 MCG/24HR IUD   Generic drug:  levonorgestrel      1 each by Intrauterine route once        * levonorgestrel 20 MCG/24HR IUD    MIRENA     1 each (20 mcg) by Intrauterine route continuous    Encounter for insertion of mirena IUD       phenazopyridine 200 MG tablet    PYRIDIUM    6 tablet    Take 1 tablet (200 mg) by mouth 3 times daily as needed for irritation    Urinary tract infection without hematuria, site unspecified       * Notice:  This list has 2 medication(s) that are the same as other medications prescribed for you. Read the directions carefully, and ask your doctor or other care provider to review them with you.

## 2018-11-25 LAB
BACTERIA SPEC CULT: NORMAL
SPECIMEN SOURCE: NORMAL

## 2018-12-17 ENCOUNTER — OFFICE VISIT (OUTPATIENT)
Dept: URGENT CARE | Facility: URGENT CARE | Age: 28
End: 2018-12-17
Payer: COMMERCIAL

## 2018-12-17 VITALS
SYSTOLIC BLOOD PRESSURE: 102 MMHG | TEMPERATURE: 98.7 F | BODY MASS INDEX: 55.99 KG/M2 | WEIGHT: 293 LBS | DIASTOLIC BLOOD PRESSURE: 80 MMHG | OXYGEN SATURATION: 97 % | HEART RATE: 79 BPM

## 2018-12-17 DIAGNOSIS — H69.93 DYSFUNCTION OF BOTH EUSTACHIAN TUBES: ICD-10-CM

## 2018-12-17 DIAGNOSIS — J34.89 NASAL DRAINAGE: ICD-10-CM

## 2018-12-17 DIAGNOSIS — J45.909 MILD ASTHMA WITHOUT COMPLICATION, UNSPECIFIED WHETHER PERSISTENT: ICD-10-CM

## 2018-12-17 DIAGNOSIS — J01.00 ACUTE MAXILLARY SINUSITIS, RECURRENCE NOT SPECIFIED: Primary | ICD-10-CM

## 2018-12-17 PROCEDURE — 99214 OFFICE O/P EST MOD 30 MIN: CPT | Performed by: PHYSICIAN ASSISTANT

## 2018-12-17 RX ORDER — FLUTICASONE PROPIONATE 50 MCG
1-2 SPRAY, SUSPENSION (ML) NASAL DAILY
Qty: 16 G | Refills: 0 | Status: SHIPPED | OUTPATIENT
Start: 2018-12-17 | End: 2019-05-07

## 2018-12-17 RX ORDER — ALBUTEROL SULFATE 90 UG/1
2 AEROSOL, METERED RESPIRATORY (INHALATION) EVERY 6 HOURS
Qty: 1 INHALER | Refills: 0 | Status: SHIPPED | OUTPATIENT
Start: 2018-12-17 | End: 2019-05-07

## 2018-12-17 RX ORDER — CEFDINIR 300 MG/1
300 CAPSULE ORAL 2 TIMES DAILY
Qty: 14 CAPSULE | Refills: 0 | Status: SHIPPED | OUTPATIENT
Start: 2018-12-17 | End: 2019-02-26

## 2018-12-17 NOTE — PROGRESS NOTES
SUBJECTIVE:   Cheryle Vazquez is a 28 year old female presenting with a chief complaint of chills, stuffy nose, cough - productive, wheezing, facial pain/pressure and body aches.  Onset of symptoms was 1 week(s) ago.  Course of illness is worsening.    Severity moderate  Current and Associated symptoms: chest congestion, wheezing  Treatment measures tried include OTC medications.  Predisposing factors include hx of recent infection and asthma like illness.    Past Medical History:   Diagnosis Date     ADHD (attention deficit hyperactivity disorder)      Depressive disorder         Allergies   Allergen Reactions     Amoxicillin Other (See Comments)     Caused yeast infection  Patient does not want to take Amoxicillin as it causes a yeast infection.  She has requested it be added to her allergy list.     Azithromycin Rash     Sulfamethoxazole-Trimethoprim Palpitations and Rash     Possible allergic reaction     Valacyclovir Palpitations and Rash     Possible allergic reaction     Family History   Problem Relation Age of Onset     Hypertension Paternal Grandmother      Asthma Mother          Social History     Tobacco Use     Smoking status: Former Smoker     Packs/day: 0.25     Years: 0.50     Pack years: 0.12     Types: Cigarettes     Smokeless tobacco: Never Used     Tobacco comment: Trying to stop---Hasn't smoked for a week.   Substance Use Topics     Alcohol use: No       ROS:  CONSTITUTIONAL:NEGATIVE for fever, chills, change in weight  INTEGUMENTARY/SKIN: NEGATIVE for worrisome rashes, moles or lesions  ENT: POSITIVE for sinus congestion, drainage, sinus pain  EYE: NEGATIVE for eye pain, redness  RESP:POSITIVE for cough-productive, Hx asthma and wheezing  CV: NEGATIVE for chest pain, palpitations or peripheral edema  GI: NEGATIVE for nausea, abdominal pain, heartburn, or change in bowel habits  : negative for and dysuria  MUSCULOSKELETAL: NEGATIVE for significant arthralgias or myalgia  NEURO: NEGATIVE  for weakness, dizziness or paresthesias    OBJECTIVE  :/80   Pulse 79   Temp 98.7  F (37.1  C) (Tympanic)   Wt 148 kg (326 lb 3.2 oz)   SpO2 97%   BMI 55.99 kg/m    GENERAL APPEARANCE: healthy, alert and no distress  EYES: EOMI,  PERRL, conjunctiva clear  HENT: TM's normal bilaterally, nasal turbinates erythematous, swollen, rhinorrhea purulent and maxillary sinus tenderness   NECK: supple, nontender, no lymphadenopathy  RESP: expiratory wheezes generalized  CV: regular rates and rhythm, normal S1 S2, no murmur noted  ABDOMEN:  soft, nontender, no HSM or masses and bowel sounds normal  NEURO: Normal strength and tone, sensory exam grossly normal,  normal speech and mentation  SKIN: no suspicious lesions or rashes    ASSESSMENT/PLAN      ICD-10-CM    1. Acute maxillary sinusitis, recurrence not specified J01.00 cefdinir (OMNICEF) 300 MG capsule   2. Dysfunction of both eustachian tubes H69.83 fluticasone (FLONASE) 50 MCG/ACT nasal spray   3. Nasal drainage J34.89 cefdinir (OMNICEF) 300 MG capsule   4. Mild asthma without complication, unspecified whether persistent J45.909 albuterol (PROVENTIL HFA) 108 (90 Base) MCG/ACT inhaler       Orders Placed This Encounter     Pseudoephedrine-DM-GG-APAP (DAYQUIL LIQUICAPS OR)     cefdinir (OMNICEF) 300 MG capsule     fluticasone (FLONASE) 50 MCG/ACT nasal spray     albuterol (PROVENTIL HFA) 108 (90 Base) MCG/ACT inhaler       Albuterol for asthma exacerbation  flonase for nasal congestion  Fluids, rest  Follow up with PCP as needed  See orders in Epic

## 2018-12-17 NOTE — LETTER
[facility]  [address] [address]  [phone]      December 17, 2018    RE:  Cheryle Vazquez                                                                                                                                                       [address] [address] [facility] [address]            To whom it may concern:    Cheryle MASHA Vazquez was seen in the urgent care today for an illness.      Sincerely,        Rolf Ko [facility]

## 2019-02-26 ENCOUNTER — OFFICE VISIT (OUTPATIENT)
Dept: URGENT CARE | Facility: URGENT CARE | Age: 29
End: 2019-02-26
Payer: COMMERCIAL

## 2019-02-26 VITALS
TEMPERATURE: 99.3 F | DIASTOLIC BLOOD PRESSURE: 80 MMHG | HEART RATE: 91 BPM | BODY MASS INDEX: 55.82 KG/M2 | WEIGHT: 293 LBS | OXYGEN SATURATION: 96 % | SYSTOLIC BLOOD PRESSURE: 102 MMHG

## 2019-02-26 DIAGNOSIS — R09.89 CHEST CONGESTION: ICD-10-CM

## 2019-02-26 DIAGNOSIS — J98.01 ACUTE BRONCHOSPASM: Primary | ICD-10-CM

## 2019-02-26 DIAGNOSIS — J34.89 SINUS PAIN: ICD-10-CM

## 2019-02-26 DIAGNOSIS — R05.8 PRODUCTIVE COUGH: ICD-10-CM

## 2019-02-26 PROCEDURE — 99214 OFFICE O/P EST MOD 30 MIN: CPT | Performed by: PHYSICIAN ASSISTANT

## 2019-02-26 RX ORDER — CEFDINIR 300 MG/1
300 CAPSULE ORAL 2 TIMES DAILY
Qty: 20 CAPSULE | Refills: 0 | Status: SHIPPED | OUTPATIENT
Start: 2019-02-26 | End: 2019-05-07

## 2019-02-26 RX ORDER — PREDNISONE 20 MG/1
20 TABLET ORAL 2 TIMES DAILY
Qty: 10 TABLET | Refills: 0 | Status: SHIPPED | OUTPATIENT
Start: 2019-02-26 | End: 2019-05-07

## 2019-02-26 RX ORDER — DEXTROMETHORPHAN POLISTIREX 30 MG/5ML
60 SUSPENSION ORAL 2 TIMES DAILY
Qty: 148 ML | Refills: 0 | Status: SHIPPED | OUTPATIENT
Start: 2019-02-26 | End: 2019-05-07

## 2019-02-26 RX ORDER — ALBUTEROL SULFATE 90 UG/1
2 AEROSOL, METERED RESPIRATORY (INHALATION) EVERY 6 HOURS
Qty: 1 INHALER | Refills: 0 | Status: SHIPPED | OUTPATIENT
Start: 2019-02-26 | End: 2019-05-07

## 2019-02-26 NOTE — PROGRESS NOTES
SUBJECTIVE:   Cheryle Vazquez is a 29 year old female presenting with a chief complaint of fever, chills, runny nose, stuffy nose, cough - non-productive and facial pain/pressure.  Onset of symptoms was 7 day(s) ago.  Course of illness is same.    Severity moderate  Current and Associated symptoms: runny nose, stuffy nose and cough - productive  Treatment measures tried include Tylenol/Ibuprofen.  Predisposing factors include recent illness .    Past Medical History:   Diagnosis Date     ADHD (attention deficit hyperactivity disorder)      Depressive disorder         Allergies   Allergen Reactions     Amoxicillin Other (See Comments)     Caused yeast infection  Patient does not want to take Amoxicillin as it causes a yeast infection.  She has requested it be added to her allergy list.     Azithromycin Rash     Sulfamethoxazole-Trimethoprim Palpitations and Rash     Possible allergic reaction     Valacyclovir Palpitations and Rash     Possible allergic reaction     Family History   Problem Relation Age of Onset     Hypertension Paternal Grandmother      Asthma Mother          Social History     Tobacco Use     Smoking status: Former Smoker     Packs/day: 0.25     Years: 0.50     Pack years: 0.12     Types: Cigarettes     Smokeless tobacco: Never Used     Tobacco comment: Trying to stop---Hasn't smoked for a week.   Substance Use Topics     Alcohol use: No       ROS:  CONSTITUTIONAL:NEGATIVE for fever, chills, change in weight  INTEGUMENTARY/SKIN: NEGATIVE for worrisome rashes, moles or lesions  ENT/MOUTH: POSITIVE for sore throat, sinus congestion  RESP:POSITIVE for cough-productive  CV: NEGATIVE for chest pain, palpitations or peripheral edema  GI: NEGATIVE for nausea, abdominal pain, heartburn, or change in bowel habits  MUSCULOSKELETAL: POSITIVE  for body aches  NEURO: NEGATIVE for weakness, dizziness or paresthesias    OBJECTIVE  :/80   Pulse 91   Temp 99.3  F (37.4  C) (Oral)   Wt 147.5 kg (325 lb  3.2 oz)   SpO2 96%   BMI 55.82 kg/m    GENERAL APPEARANCE: healthy, alert and no distress  HENT: TM's normal bilaterally, nasal turbinates erythematous, swollen, rhinorrhea green and maxillary sinus tenderness   NECK: supple, nontender, no lymphadenopathy  RESP: lungs clear to auscultation - no rales, rhonchi or wheezes  CV: regular rates and rhythm, normal S1 S2, no murmur noted  NEURO: Normal strength and tone, sensory exam grossly normal,  normal speech and mentation  SKIN: no suspicious lesions or rashes    ASSESSMENT/PLAN:    ICD-10-CM    1. Acute bronchospasm J98.01 albuterol (PROVENTIL HFA) 108 (90 Base) MCG/ACT inhaler     predniSONE (DELTASONE) 20 MG tablet   2. Chest congestion R09.89 cefdinir (OMNICEF) 300 MG capsule     dextromethorphan (DELSYM) 30 MG/5ML liquid   3. Productive cough R05 cefdinir (OMNICEF) 300 MG capsule   4. Sinus pain J34.89 predniSONE (DELTASONE) 20 MG tablet       Orders Placed This Encounter     cefdinir (OMNICEF) 300 MG capsule     albuterol (PROVENTIL HFA) 108 (90 Base) MCG/ACT inhaler     predniSONE (DELTASONE) 20 MG tablet     dextromethorphan (DELSYM) 30 MG/5ML liquid       Fluids, rest  Follow up with PCP as needed  See orders in Epic

## 2019-05-07 ENCOUNTER — OFFICE VISIT (OUTPATIENT)
Dept: FAMILY MEDICINE | Facility: CLINIC | Age: 29
End: 2019-05-07
Payer: COMMERCIAL

## 2019-05-07 VITALS
WEIGHT: 293 LBS | SYSTOLIC BLOOD PRESSURE: 110 MMHG | OXYGEN SATURATION: 96 % | DIASTOLIC BLOOD PRESSURE: 80 MMHG | HEART RATE: 85 BPM | RESPIRATION RATE: 20 BRPM | BODY MASS INDEX: 50.02 KG/M2 | HEIGHT: 64 IN | TEMPERATURE: 96.9 F

## 2019-05-07 DIAGNOSIS — F33.1 MODERATE EPISODE OF RECURRENT MAJOR DEPRESSIVE DISORDER (H): Primary | ICD-10-CM

## 2019-05-07 DIAGNOSIS — E66.01 MORBID OBESITY (H): ICD-10-CM

## 2019-05-07 DIAGNOSIS — F90.9 ATTENTION DEFICIT HYPERACTIVITY DISORDER (ADHD), UNSPECIFIED ADHD TYPE: ICD-10-CM

## 2019-05-07 DIAGNOSIS — F41.1 ANXIETY STATE: ICD-10-CM

## 2019-05-07 DIAGNOSIS — J45.20 MILD INTERMITTENT ASTHMA WITHOUT COMPLICATION: ICD-10-CM

## 2019-05-07 PROCEDURE — 99214 OFFICE O/P EST MOD 30 MIN: CPT | Performed by: FAMILY MEDICINE

## 2019-05-07 RX ORDER — ALBUTEROL SULFATE 90 UG/1
2 AEROSOL, METERED RESPIRATORY (INHALATION) EVERY 6 HOURS
Qty: 18 G | Refills: 0 | Status: SHIPPED | OUTPATIENT
Start: 2019-05-07 | End: 2019-06-02

## 2019-05-07 RX ORDER — CITALOPRAM HYDROBROMIDE 20 MG/1
20 TABLET ORAL DAILY
Qty: 30 TABLET | Refills: 0 | Status: SHIPPED | OUTPATIENT
Start: 2019-05-07 | End: 2019-06-02

## 2019-05-07 ASSESSMENT — ANXIETY QUESTIONNAIRES
3. WORRYING TOO MUCH ABOUT DIFFERENT THINGS: NEARLY EVERY DAY
7. FEELING AFRAID AS IF SOMETHING AWFUL MIGHT HAPPEN: NEARLY EVERY DAY
IF YOU CHECKED OFF ANY PROBLEMS ON THIS QUESTIONNAIRE, HOW DIFFICULT HAVE THESE PROBLEMS MADE IT FOR YOU TO DO YOUR WORK, TAKE CARE OF THINGS AT HOME, OR GET ALONG WITH OTHER PEOPLE: EXTREMELY DIFFICULT
1. FEELING NERVOUS, ANXIOUS, OR ON EDGE: NEARLY EVERY DAY
2. NOT BEING ABLE TO STOP OR CONTROL WORRYING: MORE THAN HALF THE DAYS
5. BEING SO RESTLESS THAT IT IS HARD TO SIT STILL: MORE THAN HALF THE DAYS
6. BECOMING EASILY ANNOYED OR IRRITABLE: NEARLY EVERY DAY
GAD7 TOTAL SCORE: 19

## 2019-05-07 ASSESSMENT — PATIENT HEALTH QUESTIONNAIRE - PHQ9
SUM OF ALL RESPONSES TO PHQ QUESTIONS 1-9: 23
5. POOR APPETITE OR OVEREATING: NEARLY EVERY DAY

## 2019-05-07 ASSESSMENT — MIFFLIN-ST. JEOR: SCORE: 2202.33

## 2019-05-07 NOTE — LETTER
My Asthma Action Plan  Name: Cheryle Vazquez   YOB: 1990  Date: 5/7/2019   My doctor: Katya Ovalle MD   My clinic: [facility] [address]        My Control Medicine: None  My Rescue Medicine: Albuterol (Proair/Ventolin/Proventil) inhaler prn   My Asthma Severity: intermittent  Avoid your asthma triggers: Patient is unaware of triggers  Enviromental            GREEN ZONE   Good Control    I feel good    No cough or wheeze    Can work, sleep and play without asthma symptoms       Take your asthma control medicine every day.     1. If exercise triggers your asthma, take your rescue medication    15 minutes before exercise or sports, and    During exercise if you have asthma symptoms  2. Spacer to use with inhaler: If you have a spacer, make sure to use it with your inhaler             YELLOW ZONE Getting Worse  I have ANY of these:    I do not feel good    Cough or wheeze    Chest feels tight    Wake up at night   1. Keep taking your Green Zone medications  2. Start taking your rescue medicine:    every 20 minutes for up to 1 hour. Then every 4 hours for 24-48 hours.  3. If you stay in the Yellow Zone for more than 12-24 hours, contact your doctor.  4. If you do not return to the Green Zone in 12-24 hours or you get worse, start taking your oral steroid medicine if prescribed by your provider.           RED ZONE Medical Alert - Get Help  I have ANY of these:    I feel awful    Medicine is not helping    Breathing getting harder    Trouble walking or talking    Nose opens wide to breathe       1. Take your rescue medicine NOW  2. If your provider has prescribed an oral steroid medicine, start taking it NOW  3. Call your doctor NOW  4. If you are still in the Red Zone after 20 minutes and you have not reached your doctor:    Take your rescue medicine again and    Call 911 or go to the emergency room right away    See your regular doctor within 2 weeks of an Emergency Room or  Urgent Care visit for follow-up treatment.          Annual Reminders:  Meet with Asthma Educator,  Flu Shot in the Fall, consider Pneumonia Vaccination for patients with asthma (aged 19 and older).    Pharmacy: [facility] [address] [place] [address] [address] AT [facility] [address]                      Asthma Triggers  How To Control Things That Make Your Asthma Worse    Triggers are things that make your asthma worse.  Look at the list below to help you find your triggers and what you can do about them.  You can help prevent asthma flare-ups by staying away from your triggers.      Trigger                                                          What you can do   Cigarette Smoke  Tobacco smoke can make asthma worse. Do not allow smoking in your home, car or around you.  Be sure no one smokes at a child s day care or school.  If you smoke, ask your health care provider for ways to help you quit.  Ask family members to quit too.  Ask your health care provider for a referral to Quit Plan to help you quit smoking, or call 7-688-961-PLAN.     Colds, Flu, Bronchitis  These are common triggers of asthma. Wash your hands often.  Don t touch your eyes, nose or mouth.  Get a flu shot every year.     Dust Mites  These are tiny bugs that live in cloth or carpet. They are too small to see. Wash sheets and blankets in hot water every week.   Encase pillows and mattress in dust mite proof covers.  Avoid having carpet if you can. If you have carpet, vacuum weekly.   Use a dust mask and HEPA vacuum.   Pollen and Outdoor Mold  Some people are allergic to trees, grass, or weed pollen, or molds. Try to keep your windows closed.  Limit time out doors when pollen count is high.   Ask you health care provider about taking medicine during allergy season.     Animal Dander  Some people are allergic to skin flakes, urine or saliva from pets with fur or feathers. Keep pets with fur or feathers out of your home.     If you can t keep the pet outdoors, then keep the pet out of your bedroom.  Keep the bedroom door closed.  Keep pets off cloth furniture and away from stuffed toys.     Mice, Rats, and Cockroaches  Some people are allergic to the waste from these pests.   Cover food and garbage.  Clean up spills and food crumbs.  Store grease in the refrigerator.   Keep food out of the bedroom.   Indoor Mold  This can be a trigger if your home has high moisture. Fix leaking faucets, pipes, or other sources of water.   Clean moldy surfaces.  Dehumidify basement if it is damp and smelly.   Smoke, Strong Odors, and Sprays  These can reduce air quality. Stay away from strong odors and sprays, such as perfume, powder, hair spray, paints, smoke incense, paint, cleaning products, candles and new carpet.   Exercise or Sports  Some people with asthma have this trigger. Be active!  Ask your doctor about taking medicine before sports or exercise to prevent symptoms.    Warm up for 5-10 minutes before and after sports or exercise.     Other Triggers of Asthma  Cold air:  Cover your nose and mouth with a scarf.  Sometimes laughing or crying can be a trigger.  Some medicines and food can trigger asthma.

## 2019-05-07 NOTE — LETTER
My Depression Action Plan  Name: Cheryle Vazquez   Date of Birth 1990  Date: 5/7/2019    My doctor: Carolee Clark [facility]   [facility]  [address] [address]  [phone]          GREEN    ZONE   Good Control    What it looks like:     Things are going generally well. You have normal up s and down s. You may even feel depressed from time to time, but bad moods usually last less than a day.   What you need to do:  1. Continue to care for yourself (see self care plan)  2. Check your depression survival kit and update it as needed  3. Follow your physician s recommendations including any medication.  4. Do not stop taking medication unless you consult with your physician first.           YELLOW         ZONE Getting Worse    What it looks like:     Depression is starting to interfere with your life.     It may be hard to get out of bed; you may be starting to isolate yourself from others.    Symptoms of depression are starting to last most all day and this has happened for several days.     You may have suicidal thoughts but they are not constant.   What you need to do:     1. Call your care team, your response to treatment will improve if you keep your care team informed of your progress. Yellow periods are signs an adjustment may need to be made.     2. Continue your self-care, even if you have to fake it!    3. Talk to someone in your support network    4. Open up your depression survival kit           RED    ZONE Medical Alert - Get Help    What it looks like:     Depression is seriously interfering with your life.     You may experience these or other symptoms: You can t get out of bed most days, can t work or engage in other necessary activities, you have trouble taking care of basic hygiene, or basic responsibilities, thoughts of suicide or death that will not go away, self-injurious  behavior.     What you need to do:  1. Call your care team and request a same-day appointment. If they are not available (weekends or after hours) call your local crisis line, emergency room or 911.            Depression Self Care Plan / Survival Kit    Self-Care for Depression  Here s the deal. Your body and mind are really not as separate as most people think.  What you do and think affects how you feel and how you feel influences what you do and think. This means if you do things that people who feel good do, it will help you feel better.  Sometimes this is all it takes.  There is also a place for medication and therapy depending on how severe your depression is, so be sure to consult with your medical provider and/ or Behavioral Health Consultant if your symptoms are worsening or not improving.     In order to better manage my stress, I will:    Exercise  Get some form of exercise, every day. This will help reduce pain and release endorphins, the  feel good  chemicals in your brain. This is almost as good as taking antidepressants!  This is not the same as joining a gym and then never going! (they count on that by the way ) It can be as simple as just going for a walk or doing some gardening, anything that will get you moving.      Hygiene   Maintain good hygiene (Get out of bed in the morning, Make your bed, Brush your teeth, Take a shower, and Get dressed like you were going to work, even if you are unemployed).  If your clothes don't fit try to get ones that do.    Diet  I will strive to eat foods that are good for me, drink plenty of water, and avoid excessive sugar, caffeine, alcohol, and other mood-altering substances.  Some foods that are helpful in depression are: complex carbohydrates, B vitamins, flaxseed, fish or fish oil, fresh fruits and vegetables.    Psychotherapy  I agree to participate in Individual Therapy (if recommended).    Medication  If prescribed medications, I agree to take them.   Missing doses can result in serious side effects.  I understand that drinking alcohol, or other illicit drug use, may cause potential side effects.  I will not stop my medication abruptly without first discussing it with my provider.    Staying Connected With Others  I will stay in touch with my friends, family members, and my primary care provider/team.    Use your imagination  Be creative.  We all have a creative side; it doesn t matter if it s oil painting, sand castles, or mud pies! This will also kick up the endorphins.    Witness Beauty  (AKA stop and smell the roses) Take a look outside, even in mid-winter. Notice colors, textures. Watch the squirrels and birds.     Service to others  Be of service to others.  There is always someone else in need.  By helping others we can  get out of ourselves  and remember the really important things.  This also provides opportunities for practicing all the other parts of the program.    Humor  Laugh and be silly!  Adjust your TV habits for less news and crime-drama and more comedy.    Control your stress  Try breathing deep, massage therapy, biofeedback, and meditation. Find time to relax each day.     My support system    Clinic Contact:  Phone number:    Contact 1:  Phone number:    Contact 2:  Phone number:    Latter day/:  Phone number:    Therapist:  Phone number:    Local crisis center:    Phone number:    Other community support:  Phone number:

## 2019-05-07 NOTE — NURSING NOTE
"Chief Complaint   Patient presents with     Recheck Medication     /80   Pulse 85   Temp 96.9  F (36.1  C) (Tympanic)   Resp 20   Ht 1.626 m (5' 4\")   Wt 149.2 kg (329 lb)   SpO2 96%   Breastfeeding? No   BMI 56.47 kg/m   Estimated body mass index is 56.47 kg/m  as calculated from the following:    Height as of this encounter: 1.626 m (5' 4\").    Weight as of this encounter: 149.2 kg (329 lb).  BP completed using cuff size: katy Santiago CMA    Health Maintenance Due   Topic Date Due     PREVENTIVE CARE VISIT  1990     ASTHMA ACTION PLAN Q1 YR  02/19/1995     ASTHMA CONTROL TEST Q6 MOS  02/19/1995     AAMIR QUESTIONNAIRE 6 MONTHS  02/19/2008     PHQ-9 Q6 MONTHS  02/19/2008     DTAP/TDAP/TD IMMUNIZATION (2 - Td) 01/22/2018     Health Maintenance reviewed at today's visit patient asked to schedule/complete:   Routine Health Visit: Patient agrees to schedule  Asthma:  Patient agrees to schedule  Depression:  Patient agrees to schedule  Immunizations:  Patient agrees to schedule    "

## 2019-05-07 NOTE — PROGRESS NOTES
SUBJECTIVE:   Cheryle Vazquez is a 29 year old female who presents to clinic today for the following   health issues:    Depression and Anxiety Follow-Up      Status since last visit: Worsened     Other associated symptoms:None    Complicating factors:     Significant life event: No     Current substance abuse: None  Depression and Anxiety Follow-Up    Status since last visit: No change    No treatment-ever     Other associated symptoms:None    Complicating factors:     Significant life event: No     Current substance abuse: None    PHQ 5/7/2019   PHQ-9 Total Score 23   Q9: Thoughts of better off dead/self-harm past 2 weeks Nearly every day     AAMIR-7 SCORE 5/7/2019   Total Score 19       PHQ-9  English  PHQ-9   Any Language  AAMIR-7        PHQ-9  English  PHQ-9   Any Language  AAMIR-7  Suicide Assessment Five-step Evaluation and Treatment (SAFE-T)    Asthma -out of control   -on inadequate Rx       Was ACT completed today?  Yes =16Recent asthma triggers that patient is dealing with: Enviromental     Rare use of albut MDI     Improper technic     ADHD Follow Up    - on no meds as she has preferred this   -feels may need to help with concentration    MORBID OBESITY    -BMI=56  --sedentary life style   No comorbidities  -has contd to gain           Amount of exercise or physical activity: None    Problems taking medications regularly: No    Medication side effects: none    Diet: regular (no restrictions)        Additional history: as documented    Reviewed  and updated as needed this visit by clinical staff  Tobacco  Allergies  Meds  Problems  Med Hx  Surg Hx  Fam Hx  Soc Hx          Reviewed and updated as needed this visit by Provider  Tobacco  Allergies  Meds  Problems  Med Hx  Surg Hx  Fam Hx         Labs reviewed in EPIC    ROS:  CONSTITUTIONAL: NEGATIVE for fever, chills, change in weight  INTEGUMENTARY/SKIN: NEGATIVE for worrisome rashes, moles or lesions  EYES: NEGATIVE for vision changes or  "irritation  ENT/MOUTH: NEGATIVE for ear, mouth and throat problems  RESP:POSITIVE for , cough-non productive, dyspnea on exertion and Hx asthma  BREAST: NEGATIVE for masses, tenderness or discharge  CV: NEGATIVE for chest pain, palpitations or peripheral edema  GI: NEGATIVE for nausea, abdominal pain, heartburn, or change in bowel habits  : NEGATIVE for frequency, dysuria, or hematuria  MUSCULOSKELETAL: NEGATIVE for significant arthralgias or myalgia  NEURO: NEGATIVE for weakness, dizziness or paresthesias  ENDOCRINE: NEGATIVE for temperature intolerance, skin/hair changes  HEME: NEGATIVE for bleeding problems  PSYCHIATRIC: POSITIVE for, anxiety, concentration difficulty, depressed mood, HX anxiety, HX depression and weight gain    OBJECTIVE:     /80   Pulse 85   Temp 96.9  F (36.1  C) (Tympanic)   Resp 20   Ht 1.626 m (5' 4\")   Wt 149.2 kg (329 lb)   SpO2 96%   Breastfeeding? No   BMI 56.47 kg/m    Body mass index is 56.47 kg/m .  GENERAL: healthy, alert, no distress and obese  EYES: Eyes grossly normal to inspection, PERRL and conjunctivae and sclerae normal  RESP: lungs clear to auscultation - no rales, rhonchi or wheezes  MS: no gross musculoskeletal defects noted, no edema  SKIN: no suspicious lesions or rashes  NEURO: Normal strength and tone, mentation intact and speech normal  PSYCH: mentation appears normal, affect normal/bright, fatigued and appearance well groomed    Diagnostic Test Results:  Results for orders placed or performed in visit on 11/24/18   UA with Microscopic reflex to Culture   Result Value Ref Range    Color Urine Yellow     Appearance Urine Slightly Cloudy     Glucose Urine Negative NEG^Negative mg/dL    Bilirubin Urine Negative NEG^Negative    Ketones Urine Negative NEG^Negative mg/dL    Specific Gravity Urine 1.025 1.003 - 1.035    pH Urine 7.0 5.0 - 7.0 pH    Protein Albumin Urine Negative NEG^Negative mg/dL    Urobilinogen Urine 0.2 0.2 - 1.0 EU/dL    Nitrite Urine " Negative NEG^Negative    Blood Urine Trace (A) NEG^Negative    Leukocyte Esterase Urine Small (A) NEG^Negative    Source Midstream Urine     WBC Urine 5-10 (A) OTO5^0 - 5 /HPF    RBC Urine O - 2 OTO2^O - 2 /HPF    Squamous Epithelial /LPF Urine Few FEW^Few /LPF    Bacteria Urine Moderate (A) NEG^Negative /HPF   Urine Culture Aerobic Bacterial   Result Value Ref Range    Specimen Description Midstream Urine     Culture Micro       10,000 to 50,000 colonies/mL  mixed urogenital bhavin  Susceptibility testing not routinely done         ASSESSMENT/PLAN:               ICD-10-CM    1. Moderate episode of recurrent major depressive disorder (H)-issue of 1st Rx  F33.1 citalopram (CELEXA) 20 MG tablet   2. Anxiety state F41.1    3. Mild intermittent asthma without complication onset teens - treatment  J45.20 albuterol (PROVENTIL HFA) 108 (90 Base) MCG/ACT inhaler   4. Attention deficit hyperactivity disorder (ADHD), unspecified ADHD type F90.9    5. Morbid obesity (H)  BMI 50-60 E66.01        Patient Instructions   1. If using the MDI  = handheld albuterol,  Let all your air out ,holding the inhaler about an inch form the lips  then hit the MDI  To release the med while  forcefully taking in  a deep breath.  Hold nose and mouth to keep it in  And push down to get it into lungs    Always do the albuterol 2 x  With 3 min in between   If doing  Another med eg  spiriva or steroid inhalers,  Wait 3min after the last albuterol inhalation to do these     Please do it at least 2 times a day  Its half life is 4 hrs     2. Start the citalopram at 1/2 tab a day for 2-4 days and then to a whole     3. If you ever decide you want Rxof the ADHD, you need a neuropsych eval     4.  Weight Loss Tips  1. Do not eat after 6 hrs before your expected bedtime  2. Have your heaviest meal for breakfast, a slightly lighter meal at lunch and a snack 6 hrs before bed  3. No sugar/calorie drinks except milk ie no fruit juice, pop, alcohol.  4. Drink  milk 30min before meals to decrease your hunger. Also it is excellent as part of your last meal of the day snack  5. Drink lots of water  6. Increase fiber in diet: all bran cereal, salads, popcorn etc  7. Have only one small serving of fruit a day about 1/2 cup (as this is high in sugar)  8. EXERCISE is the bottom line. Without it, you will gain weight even on a low calorie diet. Best if done 2-3X a day as can    Being overweight contributes to high blood pressure and high cholesterol, both of which cause heart attacks, strokes and kidney failure, prediabetes and diabetes, arthritis, and liver disease         Katya Ovalle MD  [facility]

## 2019-05-07 NOTE — PATIENT INSTRUCTIONS
1. If using the MDI  = handheld albuterol,  Let all your air out ,holding the inhaler about an inch form the lips  then hit the MDI  To release the med while  forcefully taking in  a deep breath.  Hold nose and mouth to keep it in  And push down to get it into lungs    Always do the albuterol 2 x  With 3 min in between   If doing  Another med eg  spiriva or steroid inhalers,  Wait 3min after the last albuterol inhalation to do these     Please do it at least 2 times a day  Its half life is 4 hrs     2. Start the citalopram at 1/2 tab a day for 2-4 days and then to a whole     3. If you ever decide you want Rxof the ADHD, you need a neuropsych eval     4.  Weight Loss Tips  1. Do not eat after 6 hrs before your expected bedtime  2. Have your heaviest meal for breakfast, a slightly lighter meal at lunch and a snack 6 hrs before bed  3. No sugar/calorie drinks except milk ie no fruit juice, pop, alcohol.  4. Drink milk 30min before meals to decrease your hunger. Also it is excellent as part of your last meal of the day snack  5. Drink lots of water  6. Increase fiber in diet: all bran cereal, salads, popcorn etc  7. Have only one small serving of fruit a day about 1/2 cup (as this is high in sugar)  8. EXERCISE is the bottom line. Without it, you will gain weight even on a low calorie diet. Best if done 2-3X a day as can    Being overweight contributes to high blood pressure and high cholesterol, both of which cause heart attacks, strokes and kidney failure, prediabetes and diabetes, arthritis, and liver disease

## 2019-05-08 ASSESSMENT — ASTHMA QUESTIONNAIRES: ACT_TOTALSCORE: 16

## 2019-05-08 ASSESSMENT — ANXIETY QUESTIONNAIRES: GAD7 TOTAL SCORE: 19

## 2019-05-10 ENCOUNTER — OFFICE VISIT (OUTPATIENT)
Dept: FAMILY MEDICINE | Facility: CLINIC | Age: 29
End: 2019-05-10
Payer: COMMERCIAL

## 2019-05-10 VITALS
WEIGHT: 293 LBS | TEMPERATURE: 97.1 F | SYSTOLIC BLOOD PRESSURE: 100 MMHG | BODY MASS INDEX: 50.02 KG/M2 | OXYGEN SATURATION: 93 % | HEART RATE: 87 BPM | DIASTOLIC BLOOD PRESSURE: 60 MMHG | HEIGHT: 64 IN | RESPIRATION RATE: 20 BRPM

## 2019-05-10 DIAGNOSIS — F32.5 MAJOR DEPRESSION IN COMPLETE REMISSION (H): Primary | ICD-10-CM

## 2019-05-10 DIAGNOSIS — J45.20 MILD INTERMITTENT ASTHMA WITHOUT COMPLICATION: ICD-10-CM

## 2019-05-10 PROCEDURE — 99213 OFFICE O/P EST LOW 20 MIN: CPT | Performed by: FAMILY MEDICINE

## 2019-05-10 ASSESSMENT — ANXIETY QUESTIONNAIRES
IF YOU CHECKED OFF ANY PROBLEMS ON THIS QUESTIONNAIRE, HOW DIFFICULT HAVE THESE PROBLEMS MADE IT FOR YOU TO DO YOUR WORK, TAKE CARE OF THINGS AT HOME, OR GET ALONG WITH OTHER PEOPLE: SOMEWHAT DIFFICULT
GAD7 TOTAL SCORE: 7
3. WORRYING TOO MUCH ABOUT DIFFERENT THINGS: SEVERAL DAYS
1. FEELING NERVOUS, ANXIOUS, OR ON EDGE: SEVERAL DAYS
2. NOT BEING ABLE TO STOP OR CONTROL WORRYING: SEVERAL DAYS
7. FEELING AFRAID AS IF SOMETHING AWFUL MIGHT HAPPEN: SEVERAL DAYS
5. BEING SO RESTLESS THAT IT IS HARD TO SIT STILL: SEVERAL DAYS
6. BECOMING EASILY ANNOYED OR IRRITABLE: SEVERAL DAYS

## 2019-05-10 ASSESSMENT — PATIENT HEALTH QUESTIONNAIRE - PHQ9
SUM OF ALL RESPONSES TO PHQ QUESTIONS 1-9: 3
5. POOR APPETITE OR OVEREATING: SEVERAL DAYS

## 2019-05-10 ASSESSMENT — MIFFLIN-ST. JEOR: SCORE: 2179.65

## 2019-05-10 NOTE — PROGRESS NOTES
SUBJECTIVE:   Cheryle Vazquez is a 29 year old female who presents to clinic today for the following   health issues:  Depression and Anxiety Follow-Up  Medication Followup of Celexa      Taking Medication as prescribed: yes    Side Effects:  None    Medication Helping Symptoms:  yes     Status since last visit: No change    No treatment-ever     Other associated symptoms:None    Complicating factors:     Significant life event: No     Current substance abuse: None    PHQ 5/7/2019   PHQ-9 Total Score 23   Q9: Thoughts of better off dead/self-harm past 2 weeks Nearly every day     AAMIR-7 SCORE 5/7/2019   Total Score 19   PHQ-9  English=4  PHQ-9   Any Language  AAMIR-7=15  Suicide Assessment Five-step Evaluation and Treatment (SAFE-T)    Asthma -now control led  -onadequate Rx  With proper technic for the MDI       Was ACT completed today?  Yes =20    Recent asthma triggers that patient is dealing with: Enviromental     biduse of albut MDI     Improper technic     ADHD Follow Up      - on no meds as she has preferred this     -feels may need to help with concentration          Amount of exercise or physical activity: None    Problems taking medications regularly: No    Medication side effects: none    Diet: regular (no restrictions)        Additional history: as documented    Reviewed  and updated as needed this visit by clinical staff  Tobacco  Allergies  Meds  Problems  Med Hx  Surg Hx  Fam Hx  Soc Hx          Reviewed and updated as needed this visit by Provider         Labs reviewed in EPIC    ROS:  CONSTITUTIONAL: NEGATIVE for fever, chills, change in weight  INTEGUMENTARY/SKIN: NEGATIVE for worrisome rashes, moles or lesions  EYES: NEGATIVE for vision changes or irritation  ENT/MOUTH: NEGATIVE for ear, mouth and throat problems  RESP: NEGATIVE for significant cough or SOB  BREAST: NEGATIVE for masses, tenderness or discharge  CV: NEGATIVE for chest pain, palpitations or peripheral edema  GI: NEGATIVE  "for nausea, abdominal pain, heartburn, or change in bowel habits  : NEGATIVE for frequency, dysuria, or hematuria  MUSCULOSKELETAL: NEGATIVE for significant arthralgias or myalgia  NEURO: NEGATIVE for weakness, dizziness or paresthesias  ENDOCRINE: NEGATIVE for temperature intolerance, skin/hair changes  HEME: NEGATIVE for bleeding problems  PSYCHIATRIC: NEGATIVE for changes in mood or affect    OBJECTIVE:     /60   Pulse 87   Temp 97.1  F (36.2  C) (Tympanic)   Resp 20   Ht 1.626 m (5' 4\")   Wt 147 kg (324 lb)   LMP  (LMP Unknown)   SpO2 93%   Breastfeeding? No   BMI 55.61 kg/m    Body mass index is 55.61 kg/m .  GENERAL: healthy, alert, no distress and obese  EYES: Eyes grossly normal to inspection, PERRL and conjunctivae and sclerae normal  RESP: lungs clear to auscultation - no rales, rhonchi or wheezes  MS: no gross musculoskeletal defects noted, no edema  SKIN: no suspicious lesions or rashes  NEURO: Normal strength and tone, mentation intact and speech normal  PSYCH: mentation appears normal, affect normal/bright        ASSESSMENT/PLAN:               ICD-10-CM    1. Major depression in complete remission (H) F32.5    2. Mild intermittent asthma without complication onset teens  J45.20        Patient Instructions   1. So on up to citalopram a whole tab and redo the PHQ-9 in 6 mo      Katya Ovalle MD  [facility]        "

## 2019-05-10 NOTE — NURSING NOTE
"Chief Complaint   Patient presents with     Recheck Medication     /60   Pulse 87   Temp 97.1  F (36.2  C) (Tympanic)   Resp 20   Ht 1.626 m (5' 4\")   Wt 147 kg (324 lb)   LMP  (LMP Unknown)   SpO2 93%   Breastfeeding? No   BMI 55.61 kg/m   Estimated body mass index is 55.61 kg/m  as calculated from the following:    Height as of this encounter: 1.626 m (5' 4\").    Weight as of this encounter: 147 kg (324 lb).  BP completed using cuff size: katy Santiago CMA    Health Maintenance Due   Topic Date Due     PREVENTIVE CARE VISIT  1990     DTAP/TDAP/TD IMMUNIZATION (2 - Td) 01/22/2018     Health Maintenance reviewed at today's visit patient asked to schedule/complete:   Routine Health Visit: Patient agrees to schedule  Immunizations:  Patient agrees to schedule    "

## 2019-05-11 PROBLEM — F32.5 MAJOR DEPRESSION IN COMPLETE REMISSION (H): Status: ACTIVE | Noted: 2019-05-11

## 2019-05-11 ASSESSMENT — ANXIETY QUESTIONNAIRES: GAD7 TOTAL SCORE: 7

## 2019-05-11 ASSESSMENT — ASTHMA QUESTIONNAIRES: ACT_TOTALSCORE: 20

## 2019-07-09 DIAGNOSIS — H69.93 DYSFUNCTION OF BOTH EUSTACHIAN TUBES: Primary | ICD-10-CM

## 2019-07-11 ENCOUNTER — OFFICE VISIT (OUTPATIENT)
Dept: URGENT CARE | Facility: URGENT CARE | Age: 29
End: 2019-07-11
Payer: COMMERCIAL

## 2019-07-11 ENCOUNTER — TELEPHONE (OUTPATIENT)
Dept: FAMILY MEDICINE | Facility: CLINIC | Age: 29
End: 2019-07-11

## 2019-07-11 VITALS
OXYGEN SATURATION: 97 % | TEMPERATURE: 98 F | RESPIRATION RATE: 16 BRPM | SYSTOLIC BLOOD PRESSURE: 118 MMHG | HEART RATE: 62 BPM | HEIGHT: 64 IN | BODY MASS INDEX: 50.02 KG/M2 | DIASTOLIC BLOOD PRESSURE: 82 MMHG | WEIGHT: 293 LBS

## 2019-07-11 DIAGNOSIS — R30.0 DYSURIA: Primary | ICD-10-CM

## 2019-07-11 LAB
ALBUMIN UR-MCNC: NEGATIVE MG/DL
APPEARANCE UR: CLEAR
BACTERIA #/AREA URNS HPF: ABNORMAL /HPF
BILIRUB UR QL STRIP: NEGATIVE
COLOR UR AUTO: YELLOW
GLUCOSE UR STRIP-MCNC: NEGATIVE MG/DL
HGB UR QL STRIP: ABNORMAL
KETONES UR STRIP-MCNC: NEGATIVE MG/DL
LEUKOCYTE ESTERASE UR QL STRIP: NEGATIVE
MUCOUS THREADS #/AREA URNS LPF: PRESENT /LPF
NITRATE UR QL: NEGATIVE
NON-SQ EPI CELLS #/AREA URNS LPF: ABNORMAL /LPF
PH UR STRIP: 5.5 PH (ref 5–7)
RBC #/AREA URNS AUTO: ABNORMAL /HPF
SOURCE: ABNORMAL
SP GR UR STRIP: >1.03 (ref 1–1.03)
SPECIMEN SOURCE: ABNORMAL
UROBILINOGEN UR STRIP-ACNC: 0.2 EU/DL (ref 0.2–1)
WBC #/AREA URNS AUTO: ABNORMAL /HPF
WET PREP SPEC: ABNORMAL

## 2019-07-11 PROCEDURE — 81001 URINALYSIS AUTO W/SCOPE: CPT | Performed by: FAMILY MEDICINE

## 2019-07-11 PROCEDURE — 87210 SMEAR WET MOUNT SALINE/INK: CPT | Performed by: FAMILY MEDICINE

## 2019-07-11 PROCEDURE — 99213 OFFICE O/P EST LOW 20 MIN: CPT | Performed by: FAMILY MEDICINE

## 2019-07-11 RX ORDER — METRONIDAZOLE 500 MG/1
500 TABLET ORAL 2 TIMES DAILY
Qty: 14 TABLET | Refills: 0 | Status: SHIPPED | OUTPATIENT
Start: 2019-07-11 | End: 2019-11-01

## 2019-07-11 ASSESSMENT — MIFFLIN-ST. JEOR: SCORE: 2166.05

## 2019-07-11 NOTE — TELEPHONE ENCOUNTER
"Central Prior Authorization Team   Phone: [phone]    Prior Authorization Not Needed per Insurance    Medication: Proair  Insurance Company: [employer] [place] - Phone [phone] Fax [phone]  Expected CoPay:      Pharmacy Filling the Rx: [id number] [address] AT [place] [address]  Pharmacy Notified: Yes  Patient Notified: Yes    Called pharmacy to see what insurance rejection was. Pharmacist stated \"not covered\". I asked her to try and run Proair or Ventolin as they are preferred products on patient's formulary. She received paid claim and will call patient when medication is ready.  "

## 2019-07-11 NOTE — TELEPHONE ENCOUNTER
Prior Authorization Retail Medication Request    Medication/Dose: Proair  ICD code (if different than what is on RX):    Previously Tried and Failed:    Rationale:      Insurance Name:    Insurance ID:        Pharmacy Information (if different than what is on RX)  Name:    Phone:      This is an urgent request!

## 2019-07-16 RX ORDER — FLUTICASONE PROPIONATE 50 MCG
SPRAY, SUSPENSION (ML) NASAL
Qty: 16 ML | Refills: 0 | OUTPATIENT
Start: 2019-07-16

## 2019-07-16 RX ORDER — FLUTICASONE PROPIONATE 50 MCG
1 SPRAY, SUSPENSION (ML) NASAL DAILY
Qty: 16 G | Refills: 3 | Status: SHIPPED | OUTPATIENT
Start: 2019-07-16 | End: 2021-09-30

## 2019-07-16 NOTE — TELEPHONE ENCOUNTER
No refills done through [facility], so rerouting to primary care team to determine if refill is appropriate.

## 2019-08-02 DIAGNOSIS — J98.01 ACUTE BRONCHOSPASM: ICD-10-CM

## 2019-08-06 RX ORDER — ALBUTEROL SULFATE 90 UG/1
2 AEROSOL, METERED RESPIRATORY (INHALATION) EVERY 6 HOURS
Qty: 18 G | Refills: 0 | Status: SHIPPED | OUTPATIENT
Start: 2019-08-06 | End: 2019-11-01

## 2019-08-23 ENCOUNTER — OFFICE VISIT (OUTPATIENT)
Dept: FAMILY MEDICINE | Facility: CLINIC | Age: 29
End: 2019-08-23
Payer: COMMERCIAL

## 2019-08-23 VITALS
OXYGEN SATURATION: 96 % | SYSTOLIC BLOOD PRESSURE: 120 MMHG | BODY MASS INDEX: 50.02 KG/M2 | WEIGHT: 293 LBS | HEIGHT: 64 IN | TEMPERATURE: 97.1 F | DIASTOLIC BLOOD PRESSURE: 70 MMHG | RESPIRATION RATE: 18 BRPM | HEART RATE: 89 BPM

## 2019-08-23 DIAGNOSIS — F32.5 MAJOR DEPRESSION IN COMPLETE REMISSION (H): ICD-10-CM

## 2019-08-23 DIAGNOSIS — F99 INSOMNIA DUE TO OTHER MENTAL DISORDER: Primary | ICD-10-CM

## 2019-08-23 DIAGNOSIS — F41.1 ANXIETY STATE: ICD-10-CM

## 2019-08-23 DIAGNOSIS — R73.01 IMPAIRED FASTING GLUCOSE: ICD-10-CM

## 2019-08-23 DIAGNOSIS — E66.01 MORBID OBESITY (H): ICD-10-CM

## 2019-08-23 DIAGNOSIS — F51.05 INSOMNIA DUE TO OTHER MENTAL DISORDER: Primary | ICD-10-CM

## 2019-08-23 DIAGNOSIS — F90.9 ATTENTION DEFICIT HYPERACTIVITY DISORDER (ADHD), UNSPECIFIED ADHD TYPE: ICD-10-CM

## 2019-08-23 DIAGNOSIS — J45.20 MILD INTERMITTENT ASTHMA WITHOUT COMPLICATION: ICD-10-CM

## 2019-08-23 PROCEDURE — 99214 OFFICE O/P EST MOD 30 MIN: CPT | Performed by: FAMILY MEDICINE

## 2019-08-23 RX ORDER — TRAZODONE HYDROCHLORIDE 50 MG/1
50 TABLET, FILM COATED ORAL AT BEDTIME
Qty: 30 TABLET | Refills: 0 | Status: SHIPPED | OUTPATIENT
Start: 2019-08-23 | End: 2019-11-01

## 2019-08-23 ASSESSMENT — ANXIETY QUESTIONNAIRES
GAD7 TOTAL SCORE: 5
6. BECOMING EASILY ANNOYED OR IRRITABLE: SEVERAL DAYS
7. FEELING AFRAID AS IF SOMETHING AWFUL MIGHT HAPPEN: SEVERAL DAYS
IF YOU CHECKED OFF ANY PROBLEMS ON THIS QUESTIONNAIRE, HOW DIFFICULT HAVE THESE PROBLEMS MADE IT FOR YOU TO DO YOUR WORK, TAKE CARE OF THINGS AT HOME, OR GET ALONG WITH OTHER PEOPLE: SOMEWHAT DIFFICULT
2. NOT BEING ABLE TO STOP OR CONTROL WORRYING: NOT AT ALL
1. FEELING NERVOUS, ANXIOUS, OR ON EDGE: SEVERAL DAYS
5. BEING SO RESTLESS THAT IT IS HARD TO SIT STILL: SEVERAL DAYS
3. WORRYING TOO MUCH ABOUT DIFFERENT THINGS: SEVERAL DAYS

## 2019-08-23 ASSESSMENT — PATIENT HEALTH QUESTIONNAIRE - PHQ9
5. POOR APPETITE OR OVEREATING: NOT AT ALL
SUM OF ALL RESPONSES TO PHQ QUESTIONS 1-9: 4

## 2019-08-23 ASSESSMENT — MIFFLIN-ST. JEOR: SCORE: 2143.37

## 2019-08-23 NOTE — NURSING NOTE
"Chief Complaint   Patient presents with     Recheck Medication     /70   Pulse 89   Temp 97.1  F (36.2  C) (Tympanic)   Resp 18   Ht 1.626 m (5' 4\")   Wt 143.3 kg (316 lb)   LMP  (LMP Unknown)   SpO2 96%   Breastfeeding? No   BMI 54.24 kg/m   Estimated body mass index is 54.24 kg/m  as calculated from the following:    Height as of this encounter: 1.626 m (5' 4\").    Weight as of this encounter: 143.3 kg (316 lb).  BP completed using cuff size: regular   Dolores Santiago CMA    Health Maintenance Due   Topic Date Due     PREVENTIVE CARE VISIT  1990     Health Maintenance reviewed at today's visit patient asked to schedule/complete:   Routine Health Visit: Patient agrees to schedule    "

## 2019-08-23 NOTE — PATIENT INSTRUCTIONS
1. I gave you trazodone  50mgm  You can increase this to 100mgm = 2 tabs   And to 150mgm = 3 tabs     Let me know     Add in melatonin  As need up to as much as you like     2.  Weight Loss Tips  1. Do not eat after 6 hrs before your expected bedtime  2. Have your heaviest meal for breakfast, a slightly lighter meal at lunch and a snack 6 hrs before bed  3. No sugar/calorie drinks except milk ie no fruit juice, pop, alcohol.  4. Drink milk 30min before meals to decrease your hunger. Also it is excellent as part of your last meal of the day snack  5. Drink lots of water  6. Increase fiber in diet: all bran cereal, salads, popcorn etc  7. Have only one small serving of fruit a day about 1/2 cup (as this is high in sugar)  8. EXERCISE is the bottom line. Without it, you will gain weight even on a low calorie diet. Best if done 2-3X a day as can    Being overweight contributes to high blood pressure and high cholesterol, both of which cause heart attacks, strokes and kidney failure, prediabetes and diabetes, arthritis, and liver disease     3. No difference was  Noted by patients in a double blind study when given codeine, tylenol ( acetaminophen) or ibuprofen (all in identical pills). They felt no difference in pain relief. Since ibuprofen and the NSAIDs  causes kidney damage, esophageal damage with heartburn, and can increase the risk of esophageal and stomach cancer and ulcers,and colonic strictures. They also cause increased risk of heart attack .   I recommend that you use tylenol(acetaminophen) for pain. Use the acetaminophen ES  Which has 500mgm/tablet You can take up to 2 tablets 4 times a day as need for pain.  If this is not enough, you can add in ibuprofen or aleve(naprosyn) with 2 glasses of fluid and some food-to protect the stomach and esophagus. Please let us know if you are continuing to take ibuprofen or aleve, as we will need to periodically check your kidney function with a blood test.    5. We should  discuss gerd and tthe above next time

## 2019-08-23 NOTE — PROGRESS NOTES
Subjective     Cheryle Vazquez is a 29 year old female who presents to clinic today for the following health issues:    HPI     Depression and Anxiety /ADHD       Taking Medication as prescribed: yes=celexa 20mgm     And much improved= in remission     States Dx of ADHD as a child and + fam hx     On ADHD meds for a yr or 2     Took herself off of them in college     Side Effects:  None    Medication Helping Symptoms:  yes     Status since last visit: No change    No treatment-ever     Other associated symptoms:None    Complicating factors:     Significant life event: No     Current substance abuse: None    PHQ 5/7/2019 8-23-19    PHQ-9 Total Score 23                                     4   Q9: Thoughts of better off dead/self-harm past 2 weeks Nearly every day     AAMIR-7 SCORE 5/7/2019   Total Score 19                                       5      PHQ-9  English=4  PHQ-9   Any Language  AAMIR-7=15  Suicide Assessment Five-step Evaluation and Treatment (SAFE-T)    Asthma Follow Up      Was ACT completed today?  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 Not at all    FBS to 110 in 5-19     What time of day are you checking your blood sugars (select all that apply)?      Have you had any blood sugars above 200?  No    Have you had any blood sugars below 70?  No    What symptoms do you notice when your blood sugar is low?  None    What  "concerns do you have today about your diabetes? None     Do you have any of these symptoms? (Select all that apply)  No numbness or tingling in feet.  No redness, sores or blisters on feet.  No complaints of excessive thirst.  No reports of blurry vision.  No significant changes to weight.     Have you had a diabetic eye exam in the last 12 months? No    BP Readings from Last 2 Encounters:   08/23/19 120/70   07/11/19 118/82     No results found for: A1C, LDL    Diabetes Management Resources          Reviewed and updated as needed this visit by Provider  Tobacco  Allergies  Meds  Problems  Med Hx  Surg Hx  Fam Hx         Review of Systems   ROS COMP: CONSTITUTIONAL: NEGATIVE for fever, chills, change in weight  INTEGUMENTARY/SKIN: NEGATIVE for worrisome rashes, moles or lesions  EYES: NEGATIVE for vision changes or irritation  ENT/MOUTH: NEGATIVE for ear, mouth and throat problems  RESP: NEGATIVE for significant cough or SOB  BREAST: NEGATIVE for masses, tenderness or discharge  CV: NEGATIVE for chest pain, palpitations or peripheral edema  GI: NEGATIVE for nausea, abdominal pain, heartburn, or change in bowel habits  : NEGATIVE for frequency, dysuria, or hematuria  MUSCULOSKELETAL: NEGATIVE for significant arthralgias or myalgia  NEURO: NEGATIVE for weakness, dizziness or paresthesias  ENDOCRINE: NEGATIVE for temperature intolerance, skin/hair changes  HEME: NEGATIVE for bleeding problems  PSYCHIATRIC: POSITIVE for, agitation, anxiety, concentration difficulty, HX anxiety, HX depression, impaired memory and weight gain      Objective    /70   Pulse 89   Temp 97.1  F (36.2  C) (Tympanic)   Resp 18   Ht 1.626 m (5' 4\")   Wt 143.3 kg (316 lb)   LMP  (LMP Unknown)   SpO2 96%   Breastfeeding? No   BMI 54.24 kg/m    Body mass index is 54.24 kg/m .  Physical Exam   GENERAL: healthy, alert, no distress and obese  EYES: Eyes grossly normal to inspection, PERRL and conjunctivae and sclerae " normal  RESP: lungs clear to auscultation - no rales, rhonchi or wheezes  CV: regular rate and rhythm, normal S1 S2, no S3 or S4, no murmur, click or rub, no peripheral edema and peripheral pulses strong  MS: no gross musculoskeletal defects noted, no edema  SKIN: no suspicious lesions or rashes  NEURO: Normal strength and tone, mentation intact and speech normal  PSYCH: mentation appears normal, concentration poor, affect normal/bright, anxious, judgement and insight intact and appearance well groomed    Diagnostic Test Results:  Labs reviewed in Epic  Results for orders placed or performed in visit on 07/11/19   *UA reflex to Microscopic and Culture ([facility] (except [place] and [place])   Result Value Ref Range    Color Urine Yellow     Appearance Urine Clear     Glucose Urine Negative NEG^Negative mg/dL    Bilirubin Urine Negative NEG^Negative    Ketones Urine Negative NEG^Negative mg/dL    Specific Gravity Urine >1.030 1.003 - 1.035    Blood Urine Trace (A) NEG^Negative    pH Urine 5.5 5.0 - 7.0 pH    Protein Albumin Urine Negative NEG^Negative mg/dL    Urobilinogen Urine 0.2 0.2 - 1.0 EU/dL    Nitrite Urine Negative NEG^Negative    Leukocyte Esterase Urine Negative NEG^Negative    Source Midstream Urine    Urine Microscopic   Result Value Ref Range    WBC Urine 0 - 5 OTO5^0 - 5 /HPF    RBC Urine 2-5 (A) OTO2^O - 2 /HPF    Squamous Epithelial /LPF Urine Many (A) FEW^Few /LPF    Bacteria Urine Moderate (A) NEG^Negative /HPF    Mucous Urine Present (A) NEG^Negative /LPF   Wet prep   Result Value Ref Range    Specimen Description Vagina     Wet Prep No Trichomonas seen     Wet Prep Clue cells seen  Many   (A)     Wet Prep No yeast seen     Wet Prep WBC'S seen  Moderate              Assessment & Plan       ICD-10-CM    1. Insomnia due to other mental disorder F51.05 traZODone (DESYREL) 50 MG tablet    F99    2. Attention deficit hyperactivity disorder (ADHD), unspecified ADHD type F90.9    3.  "Major depression in complete remission (H) F32.5    4. Anxiety state F41.1    5. Impaired fasting glucose R73.01    6. Mild intermittent asthma without complication onset teens  J45.20    7. Morbid obesity (H)  BMI 50-60 E66.01         BMI:   Estimated body mass index is 54.24 kg/m  as calculated from the following:    Height as of this encounter: 1.626 m (5' 4\").    Weight as of this encounter: 143.3 kg (316 lb).   Weight management plan: Discussed healthy diet and exercise guidelines        Patient Instructions   1. I gave you trazodone  50mgm  You can increase this to 100mgm = 2 tabs   And to 150mgm = 3 tabs     Let me know     Add in melatonin  As need up to as much as you like     2.  Weight Loss Tips  1. Do not eat after 6 hrs before your expected bedtime  2. Have your heaviest meal for breakfast, a slightly lighter meal at lunch and a snack 6 hrs before bed  3. No sugar/calorie drinks except milk ie no fruit juice, pop, alcohol.  4. Drink milk 30min before meals to decrease your hunger. Also it is excellent as part of your last meal of the day snack  5. Drink lots of water  6. Increase fiber in diet: all bran cereal, salads, popcorn etc  7. Have only one small serving of fruit a day about 1/2 cup (as this is high in sugar)  8. EXERCISE is the bottom line. Without it, you will gain weight even on a low calorie diet. Best if done 2-3X a day as can    Being overweight contributes to high blood pressure and high cholesterol, both of which cause heart attacks, strokes and kidney failure, prediabetes and diabetes, arthritis, and liver disease     3. No difference was  Noted by patients in a double blind study when given codeine, tylenol ( acetaminophen) or ibuprofen (all in identical pills). They felt no difference in pain relief. Since ibuprofen and the NSAIDs  causes kidney damage, esophageal damage with heartburn, and can increase the risk of esophageal and stomach cancer and ulcers,and colonic strictures. They " also cause increased risk of heart attack .   I recommend that you use tylenol(acetaminophen) for pain. Use the acetaminophen ES  Which has 500mgm/tablet You can take up to 2 tablets 4 times a day as need for pain.  If this is not enough, you can add in ibuprofen or aleve(naprosyn) with 2 glasses of fluid and some food-to protect the stomach and esophagus. Please let us know if you are continuing to take ibuprofen or aleve, as we will need to periodically check your kidney function with a blood test.    5. We should discuss gerd and tthe above next time     Discussed all with pt  And the patient expresses understanding  Maximiliano that long term use of amphetamines does have side effects and that if she really wants to pursue this after a few weeks of the above, needs to have an eval   Return in about 1 week (around 8/30/2019) for depression, insomnia .    Katya Ovalle MD  [facility]

## 2019-08-24 ASSESSMENT — ANXIETY QUESTIONNAIRES: GAD7 TOTAL SCORE: 5

## 2019-09-16 ENCOUNTER — OFFICE VISIT (OUTPATIENT)
Dept: URGENT CARE | Facility: URGENT CARE | Age: 29
End: 2019-09-16
Payer: COMMERCIAL

## 2019-09-16 VITALS
TEMPERATURE: 98 F | HEART RATE: 75 BPM | OXYGEN SATURATION: 97 % | BODY MASS INDEX: 54.24 KG/M2 | WEIGHT: 293 LBS | SYSTOLIC BLOOD PRESSURE: 110 MMHG | RESPIRATION RATE: 18 BRPM | DIASTOLIC BLOOD PRESSURE: 70 MMHG

## 2019-09-16 DIAGNOSIS — N89.8 VAGINAL ITCHING: Primary | ICD-10-CM

## 2019-09-16 DIAGNOSIS — N94.9 VAGINAL DISCOMFORT: ICD-10-CM

## 2019-09-16 DIAGNOSIS — R35.0 URINARY FREQUENCY: ICD-10-CM

## 2019-09-16 LAB
ALBUMIN UR-MCNC: NEGATIVE MG/DL
APPEARANCE UR: CLEAR
BACTERIA #/AREA URNS HPF: ABNORMAL /HPF
BILIRUB UR QL STRIP: ABNORMAL
COLOR UR AUTO: YELLOW
GLUCOSE UR STRIP-MCNC: NEGATIVE MG/DL
HCG UR QL: NEGATIVE
HGB UR QL STRIP: ABNORMAL
KETONES UR STRIP-MCNC: NEGATIVE MG/DL
LEUKOCYTE ESTERASE UR QL STRIP: ABNORMAL
NITRATE UR QL: NEGATIVE
NON-SQ EPI CELLS #/AREA URNS LPF: ABNORMAL /LPF
PH UR STRIP: 5.5 PH (ref 5–7)
RBC #/AREA URNS AUTO: ABNORMAL /HPF
SOURCE: ABNORMAL
SP GR UR STRIP: >1.03 (ref 1–1.03)
SPECIMEN SOURCE: NORMAL
UROBILINOGEN UR STRIP-ACNC: 0.2 EU/DL (ref 0.2–1)
WBC #/AREA URNS AUTO: ABNORMAL /HPF
WET PREP SPEC: NORMAL

## 2019-09-16 PROCEDURE — 87086 URINE CULTURE/COLONY COUNT: CPT | Performed by: PHYSICIAN ASSISTANT

## 2019-09-16 PROCEDURE — 87491 CHLMYD TRACH DNA AMP PROBE: CPT | Performed by: PHYSICIAN ASSISTANT

## 2019-09-16 PROCEDURE — 81025 URINE PREGNANCY TEST: CPT | Performed by: FAMILY MEDICINE

## 2019-09-16 PROCEDURE — 87591 N.GONORRHOEAE DNA AMP PROB: CPT | Performed by: PHYSICIAN ASSISTANT

## 2019-09-16 PROCEDURE — 87210 SMEAR WET MOUNT SALINE/INK: CPT | Performed by: FAMILY MEDICINE

## 2019-09-16 PROCEDURE — 99214 OFFICE O/P EST MOD 30 MIN: CPT | Performed by: PHYSICIAN ASSISTANT

## 2019-09-16 PROCEDURE — 81001 URINALYSIS AUTO W/SCOPE: CPT | Performed by: FAMILY MEDICINE

## 2019-09-16 RX ORDER — FLUCONAZOLE 150 MG/1
150 TABLET ORAL ONCE
Qty: 1 TABLET | Refills: 1 | Status: SHIPPED | OUTPATIENT
Start: 2019-09-16 | End: 2019-11-01

## 2019-09-16 NOTE — PROGRESS NOTES
SUBJECTIVE:   Cheryle Vazquez is a 29 year old female presenting with a chief complaint of vagina itching and irritation.  Onset of symptoms was 1 week(s) ago.  Course of illness is improving.    Severity moderate  Current and Associated symptoms: hx of yeast infections  Treatment measures tried include none.  Predisposing factors include sexual activity.    Past Medical History:   Diagnosis Date     ADHD (attention deficit hyperactivity disorder)      Depressive disorder         Allergies   Allergen Reactions     Amoxicillin Other (See Comments)     Caused yeast infection  Patient does not want to take Amoxicillin as it causes a yeast infection.  She has requested it be added to her allergy list.     Azithromycin Rash     Sulfamethoxazole-Trimethoprim Palpitations and Rash     Possible allergic reaction     Valacyclovir Palpitations and Rash     Possible allergic reaction     Family History   Problem Relation Age of Onset     Hypertension Paternal Grandmother      Asthma Mother      No Known Problems Father        Social History     Tobacco Use     Smoking status: Former Smoker     Packs/day: 0.25     Years: 0.50     Pack years: 0.12     Types: Cigarettes     Smokeless tobacco: Former User     Tobacco comment: Trying to stop---Hasn't smoked for a week.   Substance Use Topics     Alcohol use: No       ROS:  CONSTITUTIONAL:NEGATIVE for fever, chills, change in weight  INTEGUMENTARY/SKIN: NEGATIVE for worrisome rashes, moles or lesions  EYES: NEGATIVE for vision changes or irritation  ENT/MOUTH: NEGATIVE for ear, mouth and throat problems  RESP:NEGATIVE for significant cough or SOB  CV: NEGATIVE for chest pain, palpitations or peripheral edema  GI: NEGATIVE for nausea, abdominal pain, heartburn, or change in bowel habits  : POSITIVE for vaginal itching and discomfort  MUSCULOSKELETAL: NEGATIVE for significant arthralgias or myalgia  NEURO: NEGATIVE for weakness, dizziness or paresthesias    OBJECTIVE  :BP  110/70 (BP Location: Left arm, Patient Position: Sitting, Cuff Size: Adult Large)   Pulse 75   Temp 98  F (36.7  C) (Oral)   Resp 18   Wt 143.3 kg (316 lb)   LMP  (LMP Unknown)   SpO2 97%   BMI 54.24 kg/m    GENERAL APPEARANCE: healthy, alert and no distress  HENT: ear canals and TM's normal.  Nose and mouth without ulcers, erythema or lesions  NECK: supple, nontender, no lymphadenopathy  RESP: lungs clear to auscultation - no rales, rhonchi or wheezes  CV: regular rates and rhythm, normal S1 S2, no murmur noted  ABDOMEN:  soft, nontender, no HSM or masses and bowel sounds normal  GU_female: deferred by patient  NEURO: Normal strength and tone, sensory exam grossly normal,  normal speech and mentation  SKIN: no suspicious lesions or rashes    Results for orders placed or performed in visit on 09/16/19   UA with Microscopic reflex to Culture   Result Value Ref Range    Color Urine Yellow     Appearance Urine Clear     Glucose Urine Negative NEG^Negative mg/dL    Bilirubin Urine Small (A) NEG^Negative    Ketones Urine Negative NEG^Negative mg/dL    Specific Gravity Urine >1.030 1.003 - 1.035    pH Urine 5.5 5.0 - 7.0 pH    Protein Albumin Urine Negative NEG^Negative mg/dL    Urobilinogen Urine 0.2 0.2 - 1.0 EU/dL    Nitrite Urine Negative NEG^Negative    Blood Urine Moderate (A) NEG^Negative    Leukocyte Esterase Urine Trace (A) NEG^Negative    Source Midstream Urine     WBC Urine 0 - 5 OTO5^0 - 5 /HPF    RBC Urine 5-10 (A) OTO2^O - 2 /HPF    Squamous Epithelial /LPF Urine Many (A) FEW^Few /LPF    Bacteria Urine Few (A) NEG^Negative /HPF   HCG Qual, Urine ([id number])   Result Value Ref Range    HCG Qual Urine Negative NEG^Negative   Wet prep   Result Value Ref Range    Specimen Description Vagina     Wet Prep No yeast seen     Wet Prep No Trichomonas seen     Wet Prep No clue cells seen     Wet Prep No WBC's seen        ASSESSMENT/PLAN      ICD-10-CM    1. Vaginal itching N89.8 UA with Microscopic reflex to  Culture     Wet prep     fluconazole (DIFLUCAN) 150 MG tablet   2. Vaginal discomfort N94.9 HCG Qual, Urine ([id number])     NEISSERIA GONORRHOEA PCR     CHLAMYDIA TRACHOMATIS PCR     fluconazole (DIFLUCAN) 150 MG tablet   3. Urinary frequency R35.0 Urine Culture Aerobic Bacterial       Orders Placed This Encounter     UA with Microscopic reflex to Culture     HCG Qual, Urine ([id number])     fluconazole (DIFLUCAN) 150 MG tablet       Urine culture pending  STD pending  Trial course of diflucan  Monitor for any changing or worsening symptoms  Follow up with GYN if symptoms fail to improve  See orders in Epic

## 2019-09-17 LAB
BACTERIA SPEC CULT: NORMAL
BACTERIA SPEC CULT: NORMAL
C TRACH DNA SPEC QL NAA+PROBE: NEGATIVE
N GONORRHOEA DNA SPEC QL NAA+PROBE: NEGATIVE
SPECIMEN SOURCE: NORMAL

## 2019-11-01 ENCOUNTER — OFFICE VISIT (OUTPATIENT)
Dept: FAMILY MEDICINE | Facility: CLINIC | Age: 29
End: 2019-11-01
Payer: COMMERCIAL

## 2019-11-01 VITALS
HEART RATE: 76 BPM | HEIGHT: 64 IN | BODY MASS INDEX: 50.02 KG/M2 | WEIGHT: 293 LBS | SYSTOLIC BLOOD PRESSURE: 120 MMHG | TEMPERATURE: 98 F | OXYGEN SATURATION: 94 % | DIASTOLIC BLOOD PRESSURE: 80 MMHG | RESPIRATION RATE: 20 BRPM

## 2019-11-01 DIAGNOSIS — F51.01 PRIMARY INSOMNIA: ICD-10-CM

## 2019-11-01 DIAGNOSIS — F33.1 MODERATE EPISODE OF RECURRENT MAJOR DEPRESSIVE DISORDER (H): Primary | ICD-10-CM

## 2019-11-01 DIAGNOSIS — E66.01 MORBID OBESITY (H): ICD-10-CM

## 2019-11-01 DIAGNOSIS — F90.9 ATTENTION DEFICIT HYPERACTIVITY DISORDER (ADHD), UNSPECIFIED ADHD TYPE: ICD-10-CM

## 2019-11-01 DIAGNOSIS — R73.01 IMPAIRED FASTING GLUCOSE: ICD-10-CM

## 2019-11-01 DIAGNOSIS — J45.20 MILD INTERMITTENT ASTHMA WITHOUT COMPLICATION: ICD-10-CM

## 2019-11-01 PROCEDURE — 36415 COLL VENOUS BLD VENIPUNCTURE: CPT | Performed by: FAMILY MEDICINE

## 2019-11-01 PROCEDURE — 99214 OFFICE O/P EST MOD 30 MIN: CPT | Performed by: FAMILY MEDICINE

## 2019-11-01 PROCEDURE — 82947 ASSAY GLUCOSE BLOOD QUANT: CPT | Performed by: FAMILY MEDICINE

## 2019-11-01 RX ORDER — METHYLPHENIDATE HYDROCHLORIDE 54 MG/1
TABLET ORAL
COMMUNITY
End: 2020-07-06

## 2019-11-01 RX ORDER — PHENOL 1.4 %
10 AEROSOL, SPRAY (ML) MUCOUS MEMBRANE
COMMUNITY
End: 2021-09-30

## 2019-11-01 RX ORDER — CITALOPRAM HYDROBROMIDE 20 MG/1
20 TABLET ORAL DAILY
Qty: 90 TABLET | Refills: 1 | Status: SHIPPED | OUTPATIENT
Start: 2019-11-01 | End: 2020-06-19

## 2019-11-01 ASSESSMENT — PATIENT HEALTH QUESTIONNAIRE - PHQ9
SUM OF ALL RESPONSES TO PHQ QUESTIONS 1-9: 4
5. POOR APPETITE OR OVEREATING: NOT AT ALL

## 2019-11-01 ASSESSMENT — ANXIETY QUESTIONNAIRES
2. NOT BEING ABLE TO STOP OR CONTROL WORRYING: SEVERAL DAYS
GAD7 TOTAL SCORE: 5
6. BECOMING EASILY ANNOYED OR IRRITABLE: SEVERAL DAYS
1. FEELING NERVOUS, ANXIOUS, OR ON EDGE: SEVERAL DAYS
3. WORRYING TOO MUCH ABOUT DIFFERENT THINGS: SEVERAL DAYS
5. BEING SO RESTLESS THAT IT IS HARD TO SIT STILL: NOT AT ALL
IF YOU CHECKED OFF ANY PROBLEMS ON THIS QUESTIONNAIRE, HOW DIFFICULT HAVE THESE PROBLEMS MADE IT FOR YOU TO DO YOUR WORK, TAKE CARE OF THINGS AT HOME, OR GET ALONG WITH OTHER PEOPLE: NOT DIFFICULT AT ALL
7. FEELING AFRAID AS IF SOMETHING AWFUL MIGHT HAPPEN: SEVERAL DAYS

## 2019-11-01 ASSESSMENT — MIFFLIN-ST. JEOR: SCORE: 2134.29

## 2019-11-01 NOTE — LETTER
"November 5, 2019      Cheryle Vazquez  [address]  [place] [facility] [place] [address]        Dear ,    We are writing to inform you of your test results.          Sugar \"prediabetic\"   The only way known to prevent diabetes or keep it from getting worse is exercise, 20-40 minutes 3 times a day around the time of meals as your insulin is wearing out  You need to get rid of the sugar using your muscles   We could do a 3 mo test of the sugar called an HgA1C to see what your ave sugar is over 2 mo   Let me know          Resulted Orders   Glucose   Result Value Ref Range    Glucose 117 (H) 70 - 99 mg/dL       If you have any questions or concerns, please call the clinic at the number listed above.       Sincerely,        Katya Ovalle MD                "

## 2019-11-01 NOTE — PROGRESS NOTES
Subjective     Cheryle Vazquez is a 29 year old female who presents to clinic today for the following health issues:    HPI     Depression and Anxiety /ADHD         Taking Medication as prescribed: yes=celexa 20mgm     And much improved= in remission     States Dx of ADHD as a child and + fam hx     On ADHD meds for a yr or 2     Took herself off of them in college     Side Effects:  None    Medication Helping Symptoms:  yes     Status since last visit: No change    No treatment-ever     Other associated symptoms:None    Complicating factors:     Significant life event: No     Current substance abuse: None    PHQ 5/7/2019 8-23-19 11-1-19   PHQ-9 Total Score 23                                     4           4   Q9: Thoughts of better off dead/self-harm past 2 weeks Nearly every day     AAMIR-7 SCORE 5/7/2019   Total Score 19                                       5         4       Asthma-Mild INtermittent       Was ACT completed today?  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 Not at all    FBS to 110 in 5-19     What time of day are you checking your blood sugars (select all that apply)?      Have you had any blood sugars above 200?  No    Have you had any blood sugars below 70?  No    What symptoms do you notice when your blood sugar is low?  None    What concerns do you have today about your diabetes? None     Do you have any of these symptoms? (Select all that apply)  No numbness or tingling in feet.  No redness, sores or blisters on feet.  No complaints of excessive  thirst.  No reports of blurry vision.  No significant changes to weight.     Have you had a diabetic eye exam in the last 12 months? No    BP Readings from Last 2 Encounters:   08/23/19 120/70   07/11/19 118/82   No results found for: A1C, LDL  Diabetes Management Resources      How many servings of fruits and vegetables do you eat daily?  2-3    On average, how many sweetened beverages do you drink each day (soda, juice, sweet tea, etc)?   3    How many days per week do you miss taking your medication? 0    Insomnia      Duration: lifelong     Description  Frequency of insomnia:  nightly  Time to fall asleep: 30 minutes  Middle of night awakening:  nightly  Early morning awakening:  nightly    Accompanying signs and symptoms:  depression/mood changes    History  Similar episodes in past:  YES  Previous evaluation/sleep study:  no     Precipitating or alleviating factors:  New stressful situation: no   Caffeine intake after lunchtime: no   OTC decongestants: no   Any new medications: no     Therapies tried and outcome: trazodone 50mgm --> relief             Reviewed and updated as needed this visit by Provider         Review of Systems   ROS COMP: CONSTITUTIONAL: NEGATIVE for fever, chills, change in weight  INTEGUMENTARY/SKIN: NEGATIVE for worrisome rashes, moles or lesions  EYES: NEGATIVE for vision changes or irritation  ENT/MOUTH: NEGATIVE for ear, mouth and throat problems  RESP: NEGATIVE for significant cough or SOB  BREAST: NEGATIVE for masses, tenderness or discharge  CV: NEGATIVE for chest pain, palpitations or peripheral edema  GI: NEGATIVE for nausea, abdominal pain, heartburn, or change in bowel habits  : NEGATIVE for frequency, dysuria, or hematuria  MUSCULOSKELETAL: NEGATIVE for significant arthralgias or myalgia  NEURO: NEGATIVE for weakness, dizziness or paresthesias  ENDOCRINE: NEGATIVE for temperature intolerance, skin/hair changes  HEME: NEGATIVE for bleeding problems  PSYCHIATRIC: POSITIVE for,  "anxiety, concentration difficulty, depressed mood, HX anxiety, HX depression, impaired memory, insomnia  and weight gain      Objective    /80   Pulse 76   Temp 98  F (36.7  C) (Tympanic)   Resp 20   Ht 1.626 m (5' 4\")   Wt 142.4 kg (314 lb)   LMP  (LMP Unknown)   SpO2 94%   Breastfeeding? No   BMI 53.90 kg/m    Body mass index is 53.9 kg/m .  Physical Exam   GENERAL: healthy, alert, no distress and obese  EYES: Eyes grossly normal to inspection, PERRL and conjunctivae and sclerae normal  RESP: lungs clear to auscultation - no rales, rhonchi or wheezes  CV: regular rate and rhythm, normal S1 S2, no S3 or S4, no murmur, click or rub, no peripheral edema and peripheral pulses strong  MS: no gross musculoskeletal defects noted, no edema  SKIN: no suspicious lesions or rashes  NEURO: Normal strength and tone, mentation intact and speech normal  PSYCH: mentation appears normal, affect normal/bright, tearful, anxious, judgement and insight intact and appearance well groomed    Diagnostic Test Results:  Labs reviewed in Epic        Assessment & Plan       ICD-10-CM    1. Moderate episode of recurrent major depressive disorder (H)-issue of 1st Rx  F33.1 citalopram (CELEXA) 20 MG tablet   2. Primary insomnia F51.01    3. Attention deficit hyperactivity disorder (ADHD), unspecified ADHD type F90.9    4. Impaired fasting glucose R73.01    5. Mild intermittent asthma without complication onset teens  J45.20    6. Morbid obesity (H)  BMI 50-60 E66.01         BMI:   Estimated body mass index is 53.9 kg/m  as calculated from the following:    Height as of this encounter: 1.626 m (5' 4\").    Weight as of this encounter: 142.4 kg (314 lb).   Weight management plan: Discussed healthy diet and exercise guidelines        Patient Instructions   1.  Weight Loss Tips  1. Do not eat after 6 hrs before your expected bedtime  2. Have your heaviest meal for breakfast, a slightly lighter meal at lunch and a snack 6 hrs before " bed  3. No sugar/calorie drinks except milk ie no fruit juice, pop, alcohol.  4. Drink milk 30min before meals to decrease your hunger. Also it is excellent as part of your last meal of the day snack  5. Drink lots of water  6. Increase fiber in diet: all bran cereal, salads, popcorn etc  7. Have only one small serving of fruit a day about 1/2 cup (as this is high in sugar)  8. EXERCISE is the bottom line. Without it, you will gain weight even on a low calorie diet. Best if done 2-3X a day as can    Being overweight contributes to high blood pressure and high cholesterol, both of which cause heart attacks, strokes and kidney failure, prediabetes and diabetes, arthritis, and liver disease   '  2.       Return in about 1 week (around 11/8/2019) for depression.    Katya Ovalle MD  [facility]

## 2019-11-01 NOTE — LETTER
My Depression Action Plan  Name: Cheryle Vazquez   Date of Birth 1990  Date: 11/4/2019    My doctor: Katya Ovalle   My clinic: [address] [address]  [phone]          GREEN    ZONE   Good Control    What it looks like:     Things are going generally well. You have normal up s and down s. You may even feel depressed from time to time, but bad moods usually last less than a day.   What you need to do:  1. Continue to care for yourself (see self care plan)  2. Check your depression survival kit and update it as needed  3. Follow your physician s recommendations including any medication.  4. Do not stop taking medication unless you consult with your physician first.           YELLOW         ZONE Getting Worse    What it looks like:     Depression is starting to interfere with your life.     It may be hard to get out of bed; you may be starting to isolate yourself from others.    Symptoms of depression are starting to last most all day and this has happened for several days.     You may have suicidal thoughts but they are not constant.   What you need to do:     1. Call your care team, your response to treatment will improve if you keep your care team informed of your progress. Yellow periods are signs an adjustment may need to be made.     2. Continue your self-care, even if you have to fake it!    3. Talk to someone in your support network    4. Open up your depression survival kit           RED    ZONE Medical Alert - Get Help    What it looks like:     Depression is seriously interfering with your life.     You may experience these or other symptoms: You can t get out of bed most days, can t work or engage in other necessary activities, you have trouble taking care of basic hygiene, or basic responsibilities, thoughts of suicide or death that will not go away, self-injurious behavior.      What you need to do:  1. Call your care team and request a same-day appointment. If they are not available (weekends or after hours) call your local crisis line, emergency room or 911.            Depression Self Care Plan / Survival Kit    Self-Care for Depression  Here s the deal. Your body and mind are really not as separate as most people think.  What you do and think affects how you feel and how you feel influences what you do and think. This means if you do things that people who feel good do, it will help you feel better.  Sometimes this is all it takes.  There is also a place for medication and therapy depending on how severe your depression is, so be sure to consult with your medical provider and/ or Behavioral Health Consultant if your symptoms are worsening or not improving.     In order to better manage my stress, I will:    Exercise  Get some form of exercise, every day. This will help reduce pain and release endorphins, the  feel good  chemicals in your brain. This is almost as good as taking antidepressants!  This is not the same as joining a gym and then never going! (they count on that by the way ) It can be as simple as just going for a walk or doing some gardening, anything that will get you moving.      Hygiene   Maintain good hygiene (Get out of bed in the morning, Make your bed, Brush your teeth, Take a shower, and Get dressed like you were going to work, even if you are unemployed).  If your clothes don't fit try to get ones that do.    Diet  I will strive to eat foods that are good for me, drink plenty of water, and avoid excessive sugar, caffeine, alcohol, and other mood-altering substances.  Some foods that are helpful in depression are: complex carbohydrates, B vitamins, flaxseed, fish or fish oil, fresh fruits and vegetables.    Psychotherapy  I agree to participate in Individual Therapy (if recommended).    Medication  If prescribed medications, I agree to take them.  Missing doses can  result in serious side effects.  I understand that drinking alcohol, or other illicit drug use, may cause potential side effects.  I will not stop my medication abruptly without first discussing it with my provider.    Staying Connected With Others  I will stay in touch with my friends, family members, and my primary care provider/team.    Use your imagination  Be creative.  We all have a creative side; it doesn t matter if it s oil painting, sand castles, or mud pies! This will also kick up the endorphins.    Witness Beauty  (AKA stop and smell the roses) Take a look outside, even in mid-winter. Notice colors, textures. Watch the squirrels and birds.     Service to others  Be of service to others.  There is always someone else in need.  By helping others we can  get out of ourselves  and remember the really important things.  This also provides opportunities for practicing all the other parts of the program.    Humor  Laugh and be silly!  Adjust your TV habits for less news and crime-drama and more comedy.    Control your stress  Try breathing deep, massage therapy, biofeedback, and meditation. Find time to relax each day.     My support system    Clinic Contact:  Phone number:    Contact 1:  Phone number:    Contact 2:  Phone number:    Zoroastrian/:  Phone number:    Therapist:  Phone number:    Local crisis center:    Phone number:    Other community support:  Phone number:

## 2019-11-01 NOTE — NURSING NOTE
"Chief Complaint   Patient presents with     Recheck Medication     /80   Pulse 76   Temp 98  F (36.7  C) (Tympanic)   Resp 20   Ht 1.626 m (5' 4\")   Wt 142.4 kg (314 lb)   LMP  (LMP Unknown)   SpO2 94%   Breastfeeding? No   BMI 53.90 kg/m   Estimated body mass index is 53.9 kg/m  as calculated from the following:    Height as of this encounter: 1.626 m (5' 4\").    Weight as of this encounter: 142.4 kg (314 lb).  BP completed using cuff size: katy Santiago CMA    Health Maintenance Due   Topic Date Due     PREVENTIVE CARE VISIT  1990     ASTHMA CONTROL TEST  11/10/2019     Health Maintenance reviewed at today's visit patient asked to schedule/complete:   Routine Health Visit: Patient agrees to schedule  Asthma:  Patient agrees to schedule    "

## 2019-11-01 NOTE — PATIENT INSTRUCTIONS
1.  Weight Loss Tips  1. Do not eat after 6 hrs before your expected bedtime  2. Have your heaviest meal for breakfast, a slightly lighter meal at lunch and a snack 6 hrs before bed  3. No sugar/calorie drinks except milk ie no fruit juice, pop, alcohol.  4. Drink milk 30min before meals to decrease your hunger. Also it is excellent as part of your last meal of the day snack  5. Drink lots of water  6. Increase fiber in diet: all bran cereal, salads, popcorn etc  7. Have only one small serving of fruit a day about 1/2 cup (as this is high in sugar)  8. EXERCISE is the bottom line. Without it, you will gain weight even on a low calorie diet. Best if done 2-3X a day as can    Being overweight contributes to high blood pressure and high cholesterol, both of which cause heart attacks, strokes and kidney failure, prediabetes and diabetes, arthritis, and liver disease   '  2. Start with 1/2 tab of citalopram for the 1sr 2-4 d

## 2019-11-01 NOTE — LETTER
My Asthma Action Plan    Name: Cheryle Vazquez   YOB: 1990  Date: 11/4/2019   My doctor: Katya Ovalle MD   My clinic: [facility]        My Rescue Medicine:   Albuterol inhaler (Proair/Ventolin/Proventil HFA)  2-4 puffs EVERY 4 HOURS as needed. Use a spacer if recommended by your provider.   My Asthma Severity:   Intermittent / Exercise Induced  Know your asthma triggers: Patient is unaware of triggers  Enviromental          GREEN ZONE   Good Control    I feel good    No cough or wheeze    Can work, sleep and play without asthma symptoms       Take your asthma control medicine every day.     1. If exercise triggers your asthma, take your rescue medication    15 minutes before exercise or sports, and    During exercise if you have asthma symptoms  2. Spacer to use with inhaler: If you have a spacer, make sure to use it with your inhaler             YELLOW ZONE Getting Worse  I have ANY of these:    I do not feel good    Cough or wheeze    Chest feels tight    Wake up at night   1. Keep taking your Green Zone medications  2. Start taking your rescue medicine:    every 20 minutes for up to 1 hour. Then every 4 hours for 24-48 hours.  3. If you stay in the Yellow Zone for more than 12-24 hours, contact your doctor.  4. If you do not return to the Green Zone in 12-24 hours or you get worse, start taking your oral steroid medicine if prescribed by your provider.           RED ZONE Medical Alert - Get Help  I have ANY of these:    I feel awful    Medicine is not helping    Breathing getting harder    Trouble walking or talking    Nose opens wide to breathe       1. Take your rescue medicine NOW  2. If your provider has prescribed an oral steroid medicine, start taking it NOW  3. Call your doctor NOW  4. If you are still in the Red Zone after 20 minutes and you have not reached your doctor:    Take your rescue medicine again and    Call 911 or go to the emergency room  right away    See your regular doctor within 2 weeks of an Emergency Room or Urgent Care visit for follow-up treatment.          Annual Reminders:  Meet with Asthma Educator,  Flu Shot in the Fall, consider Pneumonia Vaccination for patients with asthma (aged 19 and older).    Pharmacy:    [id number] - [place], [place] - [address] AT [facility] [address] [facility] [id number] - [place], [place] - [address] AT [facility] [address]                        Asthma Triggers  How To Control Things That Make Your Asthma Worse    Triggers are things that make your asthma worse.  Look at the list below to help you find your triggers and   what you can do about them. You can help prevent asthma flare-ups by staying away from your triggers.      Trigger                                                          What you can do   Cigarette Smoke  Tobacco smoke can make asthma worse. Do not allow smoking in your home, car or around you.  Be sure no one smokes at a child s day care or school.  If you smoke, ask your health care provider for ways to help you quit.  Ask family members to quit too.  Ask your health care provider for a referral to Quit Plan to help you quit smoking, or call 6-622-068-PLAN.     Colds, Flu, Bronchitis  These are common triggers of asthma. Wash your hands often.  Don t touch your eyes, nose or mouth.  Get a flu shot every year.     Dust Mites  These are tiny bugs that live in cloth or carpet. They are too small to see. Wash sheets and blankets in hot water every week.   Encase pillows and mattress in dust mite proof covers.  Avoid having carpet if you can. If you have carpet, vacuum weekly.   Use a dust mask and HEPA vacuum.   Pollen and Outdoor Mold  Some people are allergic to trees, grass, or weed pollen, or molds. Try to keep your windows closed.  Limit time out doors when pollen count is high.   Ask you health care provider about taking medicine during  allergy season.     Animal Dander  Some people are allergic to skin flakes, urine or saliva from pets with fur or feathers. Keep pets with fur or feathers out of your home.    If you can t keep the pet outdoors, then keep the pet out of your bedroom.  Keep the bedroom door closed.  Keep pets off cloth furniture and away from stuffed toys.     Mice, Rats, and Cockroaches  Some people are allergic to the waste from these pests.   Cover food and garbage.  Clean up spills and food crumbs.  Store grease in the refrigerator.   Keep food out of the bedroom.   Indoor Mold  This can be a trigger if your home has high moisture. Fix leaking faucets, pipes, or other sources of water.   Clean moldy surfaces.  Dehumidify basement if it is damp and smelly.   Smoke, Strong Odors, and Sprays  These can reduce air quality. Stay away from strong odors and sprays, such as perfume, powder, hair spray, paints, smoke incense, paint, cleaning products, candles and new carpet.   Exercise or Sports  Some people with asthma have this trigger. Be active!  Ask your doctor about taking medicine before sports or exercise to prevent symptoms.    Warm up for 5-10 minutes before and after sports or exercise.     Other Triggers of Asthma  Cold air:  Cover your nose and mouth with a scarf.  Sometimes laughing or crying can be a trigger.  Some medicines and food can trigger asthma.

## 2019-11-02 LAB — GLUCOSE SERPL-MCNC: 117 MG/DL (ref 70–99)

## 2019-11-02 ASSESSMENT — ASTHMA QUESTIONNAIRES: ACT_TOTALSCORE: 21

## 2019-11-02 ASSESSMENT — ANXIETY QUESTIONNAIRES: GAD7 TOTAL SCORE: 5

## 2019-11-04 ENCOUNTER — OFFICE VISIT (OUTPATIENT)
Dept: URGENT CARE | Facility: URGENT CARE | Age: 29
End: 2019-11-04
Payer: COMMERCIAL

## 2019-11-04 VITALS
OXYGEN SATURATION: 98 % | TEMPERATURE: 97.9 F | WEIGHT: 293 LBS | SYSTOLIC BLOOD PRESSURE: 122 MMHG | HEART RATE: 76 BPM | DIASTOLIC BLOOD PRESSURE: 60 MMHG | BODY MASS INDEX: 53.9 KG/M2 | RESPIRATION RATE: 18 BRPM

## 2019-11-04 DIAGNOSIS — Z87.09 HISTORY OF ASTHMA: ICD-10-CM

## 2019-11-04 DIAGNOSIS — R11.2 NAUSEA AND VOMITING, INTRACTABILITY OF VOMITING NOT SPECIFIED, UNSPECIFIED VOMITING TYPE: ICD-10-CM

## 2019-11-04 DIAGNOSIS — J06.9 VIRAL URI: Primary | ICD-10-CM

## 2019-11-04 DIAGNOSIS — M54.9 UPPER BACK PAIN: ICD-10-CM

## 2019-11-04 LAB — WBC # BLD AUTO: 7.6 10E9/L (ref 4–11)

## 2019-11-04 PROCEDURE — 99214 OFFICE O/P EST MOD 30 MIN: CPT | Performed by: FAMILY MEDICINE

## 2019-11-04 PROCEDURE — 85048 AUTOMATED LEUKOCYTE COUNT: CPT | Performed by: FAMILY MEDICINE

## 2019-11-04 PROCEDURE — 36415 COLL VENOUS BLD VENIPUNCTURE: CPT | Performed by: FAMILY MEDICINE

## 2019-11-04 RX ORDER — FLUTICASONE PROPIONATE 50 MCG
2 SPRAY, SUSPENSION (ML) NASAL DAILY
Qty: 15.8 ML | Refills: 0 | Status: SHIPPED | OUTPATIENT
Start: 2019-11-04 | End: 2019-12-04

## 2019-11-04 RX ORDER — BENZONATATE 100 MG/1
100 CAPSULE ORAL 3 TIMES DAILY PRN
Qty: 21 CAPSULE | Refills: 0 | Status: SHIPPED | OUTPATIENT
Start: 2019-11-04 | End: 2019-11-11

## 2019-11-04 NOTE — PROGRESS NOTES
SUBJECTIVE: Cheryle Vazquez is a 29 year old female presenting with a chief complaint of nasal congestion, cough  and bilateral uper back pain yesterday.  Onset of symptoms was day(s) ago.  Course of illness is same.    Severity moderate  Current and Associated symptoms: runny nose, stuffy nose, cough - non-productive and N/V  Treatment measures tried include Inhaler (name: alb).  Predisposing factors include HX of asthma.    Past Medical History:   Diagnosis Date     ADHD (attention deficit hyperactivity disorder)      Depressive disorder      Allergies   Allergen Reactions     Amoxicillin Other (See Comments)     Caused yeast infection  Patient does not want to take Amoxicillin as it causes a yeast infection.  She has requested it be added to her allergy list.     Azithromycin Rash     Sulfamethoxazole-Trimethoprim Palpitations and Rash     Possible allergic reaction     Valacyclovir Palpitations and Rash     Possible allergic reaction     Social History     Tobacco Use     Smoking status: Former Smoker     Packs/day: 0.25     Years: 0.50     Pack years: 0.12     Types: Cigarettes     Smokeless tobacco: Former User     Tobacco comment: Trying to stop---Hasn't smoked for a week.   Substance Use Topics     Alcohol use: No       ROS:  SKIN: no rash  GI: no vomiting    OBJECTIVE:  /60 (BP Location: Right arm, Patient Position: Sitting, Cuff Size: Adult Regular)   Pulse 76   Temp 97.9  F (36.6  C) (Oral)   Resp 18   Wt 142.4 kg (314 lb)   LMP  (LMP Unknown)   SpO2 98%   BMI 53.90 kg/m  GENERAL APPEARANCE: healthy, alert and no distress  EYES: EOMI,  PERRL, conjunctiva clear  HENT: ear canals and TM's normal.  Nose and mouth without ulcers, erythema or lesions  NECK: supple, nontender, no lymphadenopathy  RESP: lungs clear to auscultation - no rales, rhonchi or wheezes  CV: regular rates and rhythm, normal S1 S2, no murmur noted  ABDOMEN:  soft, nontender, no HSM or masses and bowel sounds  normal  SKIN: no suspicious lesions or rashes      ICD-10-CM    1. Viral URI J06.9 WBC count     benzonatate (TESSALON) 100 MG capsule     fluticasone (FLONASE) 50 MCG/ACT nasal spray   2. Nausea and vomiting, intractability of vomiting not specified, unspecified vomiting type R11.2 WBC count   3. Upper back pain M54.9 WBC count   4. History of asthma Z87.09      Cont alb inhaler  Fluids/Rest, f/u if worse/not any better

## 2019-12-15 ENCOUNTER — OFFICE VISIT (OUTPATIENT)
Dept: URGENT CARE | Facility: URGENT CARE | Age: 29
End: 2019-12-15
Payer: COMMERCIAL

## 2019-12-15 VITALS
OXYGEN SATURATION: 94 % | DIASTOLIC BLOOD PRESSURE: 78 MMHG | TEMPERATURE: 98.1 F | SYSTOLIC BLOOD PRESSURE: 124 MMHG | HEART RATE: 73 BPM

## 2019-12-15 DIAGNOSIS — J20.9 ACUTE BRONCHITIS, UNSPECIFIED ORGANISM: Primary | ICD-10-CM

## 2019-12-15 PROCEDURE — 99213 OFFICE O/P EST LOW 20 MIN: CPT | Performed by: PHYSICIAN ASSISTANT

## 2019-12-15 RX ORDER — CODEINE PHOSPHATE AND GUAIFENESIN 10; 100 MG/5ML; MG/5ML
1-2 SOLUTION ORAL EVERY 4 HOURS PRN
Qty: 236 ML | Refills: 0 | Status: SHIPPED | OUTPATIENT
Start: 2019-12-15 | End: 2020-08-07

## 2019-12-15 RX ORDER — ALBUTEROL SULFATE 90 UG/1
1-2 AEROSOL, METERED RESPIRATORY (INHALATION) EVERY 6 HOURS
Qty: 8 G | Refills: 0 | Status: SHIPPED | OUTPATIENT
Start: 2019-12-15 | End: 2020-12-02

## 2019-12-16 NOTE — PATIENT INSTRUCTIONS
Patient Education     Acute Bronchitis  Your healthcare provider has told you that you have acute bronchitis. Bronchitis is infection or inflammation of the bronchial tubes (airways in the lungs). Normally, air moves easily in and out of the airways. Bronchitis narrows the airways, making it harder for air to flow in and out of the lungs. This causes symptoms such as shortness of breath, coughing up yellow or green mucus, and wheezing. Bronchitis can be acute or chronic. Acute means the condition comes on quickly and goes away in a short time, usually within 3 to 10 days. Chronic means a condition lasts a long time and often comes back.    What causes acute bronchitis?  Acute bronchitis almost always starts as a viral respiratory infection, such as a cold or the flu. Certain factors make it more likely for a cold or flu to turn into bronchitis. These include being very young, being elderly, having a heart or lung problem, or having a weak immune system. Cigarette smoking also makes bronchitis more likely.  When bronchitis develops, the airways become swollen. The airways may also become infected with bacteria. This is known as a secondary infection.  Diagnosing acute bronchitis  Your healthcare provider will examine you and ask about your symptoms and health history. You may also have a sputum culture to test the fluid in your lungs. Chest X-rays may be done to look for infection in the lungs.  Treating acute bronchitis  Bronchitis usually clears up as the cold or flu goes away. You can help feel better faster by doing the following:    Take medicine as directed. You may be told to take ibuprofen or other over-the-counter medicines. These help relieve inflammation in your bronchial tubes. Your healthcare provider may prescribe an inhaler to help open up the bronchial tubes. Most of the time, acute bronchitis is caused by a viral infection. Antibiotics are usually not prescribed for viral infections.    Drink  plenty of fluids, such as water, juice, or warm soup. Fluids loosen mucus so that you can cough it up. This helps you breathe more easily. Fluids also prevent dehydration.    Make sure you get plenty of rest.    Do not smoke. Do not allow anyone else to smoke in your home.  Recovery and follow-up  Follow up with your doctor as you are told. You will likely feel better in a week or two. But a dry cough can linger beyond that time. Let your doctor know if you still have symptoms (other than a dry cough) after 2 weeks, or if you re prone to getting bronchial infections. Take steps to protect yourself from future infections. These steps include stopping smoking and avoiding tobacco smoke, washing your hands often, and getting a yearly flu shot.  When to call your healthcare provider  Call the healthcare provider if you have any of the following:    Fever of 100.4 F (38.0 C) or higher, or as advised    Symptoms that get worse, or new symptoms    Trouble breathing    Symptoms that don t start to improve within a week, or within 3 days of taking antibiotics   Date Last Reviewed: 12/1/2016 2000-2018 [employer]. [address] [place] [address], [place] [address]. All rights reserved. This information is not intended as a substitute for professional medical care. Always follow your healthcare professional's instructions.

## 2019-12-16 NOTE — PROGRESS NOTES
SUBJECTIVE:   Cheryle Vazquez is a 29 year old female presenting with a chief complaint of runny nose, stuffy nose, cough - productive and fatigue.  Onset of symptoms was 1 day(s) ago.  Course of illness is worsening.    Severity moderate  Treatment measures tried include Decongestants, OTC Cough med, Fluids and Rest.  Predisposing factors include ill contact: Family member  and HX of asthma.    Past Medical History:   Diagnosis Date     ADHD (attention deficit hyperactivity disorder)      Depressive disorder      Current Outpatient Medications   Medication Sig Dispense Refill     citalopram (CELEXA) 20 MG tablet Take 1 tablet (20 mg) by mouth daily 90 tablet 1     fluticasone (FLONASE) 50 MCG/ACT nasal spray Spray 1 spray into both nostrils daily 16 g 3     Melatonin 10 MG TABS tablet Take 10 mg by mouth nightly as needed for sleep       methylphenidate (CONCERTA) 54 MG CR tablet        Social History     Tobacco Use     Smoking status: Former Smoker     Packs/day: 0.25     Years: 0.50     Pack years: 0.12     Types: Cigarettes     Smokeless tobacco: Former User     Tobacco comment: Trying to stop---Hasn't smoked for a week.   Substance Use Topics     Alcohol use: No       ROS:  CONSTITUTIONAL:NEGATIVE for fever, chills, change in weight  INTEGUMENTARY/SKIN: NEGATIVE for worrisome rashes, moles or lesions  EYES: NEGATIVE for vision changes or irritation  ENT/MOUTH: NEGATIVE for ear, mouth and throat problems  RESP:NEGATIVE for significant cough or SOB  CV: NEGATIVE for chest pain, palpitations or peripheral edema  GI: NEGATIVE for nausea, abdominal pain, heartburn, or change in bowel habits  HEME/ALLERGY/IMMUNE: NEGATIVE for bleeding problems  PSYCHIATRIC: NEGATIVE for changes in mood or affect    OBJECTIVE:  /78   Pulse 73   Temp 98.1  F (36.7  C) (Oral)   SpO2 94%   GENERAL APPEARANCE: healthy, alert and no distress  EYES: EOMI,  PERRL, conjunctiva clear  HENT: ear canals and TM's normal.  Nose and  mouth without ulcers, erythema or lesions  NECK: supple, nontender, no lymphadenopathy  RESP: lungs clear to auscultation - no rales, rhonchi or wheezes  CV: regular rates and rhythm, normal S1 S2, no murmur noted  ABDOMEN:  soft, nontender, no HSM or masses and bowel sounds normal  NEURO: Normal strength and tone, sensory exam grossly normal,  normal speech and mentation  SKIN: no suspicious lesions or rashes  PSYCH: mentation appears normal, affect normal/bright and fatigued  LYMPHATICS: no cervical adenopathy    ASSESSMENT/PLAN:      (J20.9) Acute bronchitis, unspecified organism  (primary encounter diagnosis)  Plan: guaiFENesin-codeine (ROBITUSSIN AC) 100-10         MG/5ML solution, albuterol (PROAIR         HFA/PROVENTIL HFA/VENTOLIN HFA) 108 (90 Base)         MCG/ACT inhaler      Age 6-12:   Okay to use Children Motrin OTC    Adults:  Okay to take acetaminophen 500 mg- 2 tabs (Total of 1000 mg) every 8 hrs   Okay to take ibuprofen 200 mg- 3 tabs (Total of 600 mg) every 6 hours        Okay to use Neti pot for sinus lavage up to three times daily for congestion and sinus pressure. Daily hot shower can be beneficial for congestion and body aches. Okay to use bedroom vaporizer or humidifier if symptoms are worse at night. Nightly Vicks Vapor rub okay to use for sleep.     OTC cough medication and decongestants okay if not prescribed by me during this visit.       Okay to use salt water gargles and lozenges for throat discomfort.     Patient will need to get plenty of rest and drink at least 1.5-2 liters of fluids daily for adults and 1-1.5 liters for children. If vomiting and not tolerating liquids for more than 24 hrs, please go to your nearest emergency department for IV fluids and further treatment.     Patient is not contagious after 1 week from start of symptoms. If possible, wear mask for first 7 days. Wash hands regularly and vigorously for 30 seconds often.      Patient was advised to return to clinic if  symptoms do not improve in the amount of time specified in the AVS or if symptoms worsen. Patient educated on red flag symptoms and asked to go directly to the ED if symptoms present themselves.      Rolf Clay PA-C on 12/15/2019 at 7:10 PM

## 2019-12-17 ENCOUNTER — ANCILLARY PROCEDURE (OUTPATIENT)
Dept: GENERAL RADIOLOGY | Facility: CLINIC | Age: 29
End: 2019-12-17
Attending: PHYSICIAN ASSISTANT
Payer: COMMERCIAL

## 2019-12-17 ENCOUNTER — OFFICE VISIT (OUTPATIENT)
Dept: URGENT CARE | Facility: URGENT CARE | Age: 29
End: 2019-12-17
Payer: COMMERCIAL

## 2019-12-17 VITALS
SYSTOLIC BLOOD PRESSURE: 120 MMHG | HEART RATE: 83 BPM | BODY MASS INDEX: 53.9 KG/M2 | OXYGEN SATURATION: 95 % | TEMPERATURE: 98.6 F | WEIGHT: 293 LBS | DIASTOLIC BLOOD PRESSURE: 52 MMHG | RESPIRATION RATE: 20 BRPM

## 2019-12-17 DIAGNOSIS — R05.9 COUGH: ICD-10-CM

## 2019-12-17 DIAGNOSIS — J18.9 PNEUMONIA OF LEFT LOWER LOBE DUE TO INFECTIOUS ORGANISM: Primary | ICD-10-CM

## 2019-12-17 DIAGNOSIS — R06.2 WHEEZING: ICD-10-CM

## 2019-12-17 PROCEDURE — 94640 AIRWAY INHALATION TREATMENT: CPT | Performed by: PHYSICIAN ASSISTANT

## 2019-12-17 PROCEDURE — 71046 X-RAY EXAM CHEST 2 VIEWS: CPT

## 2019-12-17 PROCEDURE — 99214 OFFICE O/P EST MOD 30 MIN: CPT | Mod: 25 | Performed by: PHYSICIAN ASSISTANT

## 2019-12-17 RX ORDER — LEVALBUTEROL INHALATION SOLUTION 1.25 MG/3ML
1.25 SOLUTION RESPIRATORY (INHALATION) ONCE
Status: COMPLETED | OUTPATIENT
Start: 2019-12-17 | End: 2019-12-17

## 2019-12-17 RX ORDER — PREDNISONE 10 MG/1
10 TABLET ORAL DAILY
Qty: 5 TABLET | Refills: 0 | Status: SHIPPED | OUTPATIENT
Start: 2019-12-17 | End: 2019-12-22

## 2019-12-17 RX ORDER — DOXYCYCLINE 100 MG/1
100 CAPSULE ORAL 2 TIMES DAILY
Qty: 20 CAPSULE | Refills: 0 | Status: SHIPPED | OUTPATIENT
Start: 2019-12-17 | End: 2019-12-27

## 2019-12-17 RX ORDER — ALBUTEROL SULFATE 0.83 MG/ML
2.5 SOLUTION RESPIRATORY (INHALATION) EVERY 6 HOURS PRN
Qty: 25 VIAL | Refills: 0 | Status: SHIPPED | OUTPATIENT
Start: 2019-12-17 | End: 2023-03-30

## 2019-12-17 RX ADMIN — LEVALBUTEROL INHALATION SOLUTION 1.25 MG: 1.25 SOLUTION RESPIRATORY (INHALATION) at 12:29

## 2019-12-17 NOTE — LETTER
[facility]  [address] [address]  [phone]      December 17, 2019    RE:  Cheryle Vazquez                                                                                                                                                       [address] [facility] [address]            To whom it may concern:    Cheryle Vazquez was seen in clinic today and was diagnosed with Pneumonia.  She may return to work on Thursday.        Sincerely,        Coral Belle PA-C    [facility]

## 2019-12-17 NOTE — PATIENT INSTRUCTIONS
(J18.1) Pneumonia of left lower lobe due to infectious organism (H)  (primary encounter diagnosis)  Comment:   Plan: Doxycycline as prescribed    (R05) Cough  Comment:   Plan: XR Chest 2 Views            (R06.2) Wheezing  Comment:   Plan: INHALATION/NEBULIZER TREATMENT, INITIAL,         levalbuterol (XOPENEX) neb solution 1.25 mg,         order for DME, predniSONE (DELTASONE) 10 MG         tablet, albuterol (PROVENTIL) (2.5 MG/3ML)         0.083% neb solution            Follow up with primary clinic within 10 days for re-check, sooner should symptoms persist or worsen.  Continue with Robitussin AC cough syrup as needed (prescribed at previous visit)

## 2019-12-17 NOTE — PROGRESS NOTES
Clinic Administered Medication Documentation    MEDICATION LIST: Inhalable/Nebs Medication Documentation    Patient was given Levalbuterol. Prior to medication administration, verified patients identity using patient s name and date of birth. Please see MAR and medication order for additional information.

## 2019-12-17 NOTE — PROGRESS NOTES
Patient presents with:  URI: cough, wheezing,. Pt was seen on 12/15/19 and was prescribed cough syrup     SUBJECTIVE:   Cheryle Vazquez is a 29 year old female presenting with a chief complaint of:  1) cough for the past 3 days.  Seen 2 days ago for her symptoms and was prescribed a cough syrup.  Sleeping OK with cough medication.  However, still coughing and hoping to feel better to go back to work tomorrow.    2) some wheezing and shortness of breath.      Onset of symptoms was as above.  Course of illness is worsening.    Severity moderate  Current and Associated symptoms: as above  Treatment measures tried include as above.  Predisposing factors include asthma.    Past Medical History:   Diagnosis Date     ADHD (attention deficit hyperactivity disorder)      Depressive disorder      Patient Active Problem List   Diagnosis     Depressed     Insertion of mirena IUD     Morbid obesity (H)     ADHD (attention deficit hyperactivity disorder)     Anxiety state     Marital/partner relational problem     Mild intermittent asthma without complication onset teens      Moderate episode of recurrent major depressive disorder (H)-issue of 1st Rx      Major depression in complete remission (H)     Primary insomnia     Social History     Tobacco Use     Smoking status: Former Smoker     Packs/day: 0.25     Years: 0.50     Pack years: 0.12     Types: Cigarettes     Smokeless tobacco: Former User     Tobacco comment: Trying to stop---Hasn't smoked for a week.   Substance Use Topics     Alcohol use: No       ROS:  CONSTITUTIONAL:NEGATIVE for fever, chills, change in weight  INTEGUMENTARY/SKIN: NEGATIVE for worrisome rashes, moles or lesions  EYES: NEGATIVE for vision changes or irritation  ENT/MOUTH: as per HPI  RESP:as per HPI  CV: NEGATIVE for chest pain, palpitations or peripheral edema  GI: NEGATIVE for nausea, abdominal pain, heartburn, or change in bowel habits  : negative  MUSCULOSKELETAL: NEGATIVE for significant  arthralgias or myalgia  NEURO: NEGATIVE for weakness, dizziness or paresthesias  Review of systems negative except as stated above.    OBJECTIVE  :/52   Pulse 83   Temp 98.6  F (37  C) (Oral)   Resp 20   Wt 142.4 kg (314 lb)   SpO2 95%   BMI 53.90 kg/m    GENERAL APPEARANCE: healthy, alert and no distress  EYES: EOMI,  PERRL, conjunctiva clear  HENT: ear canals and TM's normal.  Nose and mouth without ulcers, erythema or lesions  NECK: supple, nontender, no lymphadenopathy  RESP: wheezing which improved with neb in clinic but did not clear completely  CV: regular rates and rhythm, normal S1 S2, no murmur noted  ABDOMEN:  soft, nontender, no HSM or masses and bowel sounds normal  NEURO: Normal strength and tone, sensory exam grossly normal,  normal speech and mentation  SKIN: no suspicious lesions or rashes    (J18.1) Pneumonia of left lower lobe due to infectious organism (H)  (primary encounter diagnosis)  Comment:   Plan: Doxycycline as prescribed    (R05) Cough  Comment:   Plan: XR Chest 2 Views            (R06.2) Wheezing  Comment:   Plan: INHALATION/NEBULIZER TREATMENT, INITIAL,         levalbuterol (XOPENEX) neb solution 1.25 mg,         order for DME, predniSONE (DELTASONE) 10 MG         tablet, albuterol (PROVENTIL) (2.5 MG/3ML)         0.083% neb solution            Follow up with primary clinic within 10 days for re-check, sooner should symptoms persist or worsen.  Continue with Robitussin AC cough syrup as needed (prescribed at previous visit)

## 2020-01-31 ENCOUNTER — OFFICE VISIT (OUTPATIENT)
Dept: URGENT CARE | Facility: URGENT CARE | Age: 30
End: 2020-01-31
Payer: COMMERCIAL

## 2020-01-31 VITALS
SYSTOLIC BLOOD PRESSURE: 127 MMHG | WEIGHT: 293 LBS | OXYGEN SATURATION: 98 % | HEART RATE: 79 BPM | DIASTOLIC BLOOD PRESSURE: 80 MMHG | TEMPERATURE: 97.2 F | BODY MASS INDEX: 51.49 KG/M2

## 2020-01-31 DIAGNOSIS — Z11.3 SCREEN FOR STD (SEXUALLY TRANSMITTED DISEASE): ICD-10-CM

## 2020-01-31 DIAGNOSIS — R35.0 URINARY FREQUENCY: Primary | ICD-10-CM

## 2020-01-31 DIAGNOSIS — R30.0 DYSURIA: ICD-10-CM

## 2020-01-31 LAB
ALBUMIN UR-MCNC: NEGATIVE MG/DL
APPEARANCE UR: ABNORMAL
BACTERIA #/AREA URNS HPF: ABNORMAL /HPF
BILIRUB UR QL STRIP: NEGATIVE
COLOR UR AUTO: YELLOW
GLUCOSE UR STRIP-MCNC: NEGATIVE MG/DL
HGB UR QL STRIP: NEGATIVE
KETONES UR STRIP-MCNC: NEGATIVE MG/DL
LEUKOCYTE ESTERASE UR QL STRIP: ABNORMAL
NITRATE UR QL: NEGATIVE
NON-SQ EPI CELLS #/AREA URNS LPF: ABNORMAL /LPF
PH UR STRIP: 5 PH (ref 5–7)
RBC #/AREA URNS AUTO: ABNORMAL /HPF
SOURCE: ABNORMAL
SP GR UR STRIP: 1.02 (ref 1–1.03)
UROBILINOGEN UR STRIP-ACNC: 0.2 EU/DL (ref 0.2–1)
WBC #/AREA URNS AUTO: ABNORMAL /HPF

## 2020-01-31 PROCEDURE — 87591 N.GONORRHOEAE DNA AMP PROB: CPT | Performed by: FAMILY MEDICINE

## 2020-01-31 PROCEDURE — 81001 URINALYSIS AUTO W/SCOPE: CPT | Performed by: FAMILY MEDICINE

## 2020-01-31 PROCEDURE — 87088 URINE BACTERIA CULTURE: CPT | Performed by: FAMILY MEDICINE

## 2020-01-31 PROCEDURE — 87491 CHLMYD TRACH DNA AMP PROBE: CPT | Performed by: FAMILY MEDICINE

## 2020-01-31 PROCEDURE — 87086 URINE CULTURE/COLONY COUNT: CPT | Performed by: FAMILY MEDICINE

## 2020-01-31 PROCEDURE — 99213 OFFICE O/P EST LOW 20 MIN: CPT | Performed by: FAMILY MEDICINE

## 2020-01-31 RX ORDER — NITROFURANTOIN 25; 75 MG/1; MG/1
100 CAPSULE ORAL 2 TIMES DAILY
Qty: 14 CAPSULE | Refills: 0 | Status: SHIPPED | OUTPATIENT
Start: 2020-01-31 | End: 2020-07-06

## 2020-01-31 NOTE — PROGRESS NOTES
SUBJECTIVE:   Cheryle Vazquez is a 29 year old female who  presents today for a possible UTI. Symptoms of frequency have been going on for 1day(s).  Hematuria no.  sudden onsetand moderate.  There is no history of fever, chills, nausea or vomiting.  No history of vaginal  discharge. This patient does not  have a history of urinary tract infections. Patient denies flank pain, temperature > 101 degrees F. and Vomiting, significant nausea or diarrhea or vaginal discharge     Past Medical History:   Diagnosis Date     ADHD (attention deficit hyperactivity disorder)      Depressive disorder      Current Outpatient Medications   Medication Sig Dispense Refill     albuterol (PROAIR HFA/PROVENTIL HFA/VENTOLIN HFA) 108 (90 Base) MCG/ACT inhaler Inhale 1-2 puffs into the lungs every 6 hours 8 g 0     albuterol (PROVENTIL) (2.5 MG/3ML) 0.083% neb solution Take 1 vial (2.5 mg) by nebulization every 6 hours as needed 25 vial 0     citalopram (CELEXA) 20 MG tablet Take 1 tablet (20 mg) by mouth daily 90 tablet 1     fluticasone (FLONASE) 50 MCG/ACT nasal spray Spray 1 spray into both nostrils daily 16 g 3     guaiFENesin-codeine (ROBITUSSIN AC) 100-10 MG/5ML solution Take 5-10 mLs by mouth every 4 hours as needed for cough 236 mL 0     Melatonin 10 MG TABS tablet Take 10 mg by mouth nightly as needed for sleep       methylphenidate (CONCERTA) 54 MG CR tablet        nitroFURantoin macrocrystal-monohydrate (MACROBID) 100 MG capsule Take 1 capsule (100 mg) by mouth 2 times daily for 7 days 14 capsule 0     order for DME Equipment being ordered: Nebulizer 1 Device 0     Social History     Tobacco Use     Smoking status: Former Smoker     Packs/day: 0.25     Years: 0.50     Pack years: 0.12     Types: Cigarettes     Smokeless tobacco: Former User     Tobacco comment: Trying to stop---Hasn't smoked for a week.   Substance Use Topics     Alcohol use: No       ROS:   10 point ROS of systems including Constitutional, Eyes,  Respiratory, Cardiovascular, Gastroenterology,  Integumentary, Muscularskeletal, Psychiatric were all negative except for pertinent positives noted in my HPI     '    OBJECTIVE:  /80   Pulse 79   Temp 97.2  F (36.2  C)   Wt 136.1 kg (300 lb)   SpO2 98%   Breastfeeding No   BMI 51.49 kg/m    GENERAL APPEARANCE: healthy, alert and no distress  RESP: lungs clear to auscultation - no rales, rhonchi or wheezes  CV: regular rates and rhythm, normal S1 S2, no murmur noted  ABDOMEN:  soft, nontender, no HSM or masses and bowel sounds normal  BACK: No CVA tenderness  SKIN: no suspicious lesions or rashes  PSYCH: mentation appears normal    ASSESSMENT:   Lower, uncomplicated urinary tract infection.  Cheryle was seen today for urgent care.    Diagnoses and all orders for this visit:    Urinary frequency  -     *UA reflex to Microscopic  -     Urine Microscopic  -     Urine Culture Aerobic Bacterial    Screen for STD (sexually transmitted disease)  -     Neisseria gonorrhoeae PCR  -     Chlamydia trachomatis PCR    Dysuria  -     nitroFURantoin macrocrystal-monohydrate (MACROBID) 100 MG capsule; Take 1 capsule (100 mg) by mouth 2 times daily for 7 days          PLAN:  As per ordered above  Drink plenty of fluids.  Prevention and treatment of UTI's discussed.Signs and symptoms of pyelonephritis mentioned.  Follow up with primary care physician if not improving  I did review with patient in details about the UA results UC is pending patient will be treated with Macrobid for 7 days advised patient to push fluids  Reviewed with patient that the gonorrhea chlamydia result will be back not before 48 hours patient will be called only if the test result is positive otherwise she will not be called.  Patient understands and agrees with the plan if the urinary symptoms continue persist after 48 hours she should follow-up.  Follow up if  symptoms fail to improve or worsens   Pt understood and agreed with plan     Cassie  Beckie CARLTON

## 2020-02-02 LAB
BACTERIA SPEC CULT: ABNORMAL
C TRACH DNA SPEC QL NAA+PROBE: NEGATIVE
N GONORRHOEA DNA SPEC QL NAA+PROBE: NEGATIVE
SPECIMEN SOURCE: ABNORMAL
SPECIMEN SOURCE: NORMAL
SPECIMEN SOURCE: NORMAL

## 2020-04-14 ENCOUNTER — NURSE TRIAGE (OUTPATIENT)
Dept: NURSING | Facility: CLINIC | Age: 30
End: 2020-04-14

## 2020-04-14 NOTE — TELEPHONE ENCOUNTER
Pt is wondering what medications she was given in mid December when she had pneumonia and bronchitis. From 12-15 and 12-17/2019 I read her meds as given. Wonders if it was caused by covid back then. Informed not on clinics radar at that time. Would need the antibody test done to know for sure. It's not widely available at this time.    Susan Vinson RN  [facility]  [facility]

## 2020-06-12 DIAGNOSIS — F33.1 MODERATE EPISODE OF RECURRENT MAJOR DEPRESSIVE DISORDER (H): ICD-10-CM

## 2020-06-12 NOTE — TELEPHONE ENCOUNTER
Appointment AND updated PHQ9 needed for patient.    Please call patient 2 times in attempt to schedule an appointment for ASAP.    If appointment scheduled, please check with patient to see if they have enough medication to get them to appointment.  Please route back to triage with the above information.    If unable to get a hold of patient, please route to the provider.

## 2020-06-16 RX ORDER — CITALOPRAM HYDROBROMIDE 20 MG/1
TABLET ORAL
Qty: 90 TABLET | Refills: 1 | OUTPATIENT
Start: 2020-06-16

## 2020-06-16 ASSESSMENT — PATIENT HEALTH QUESTIONNAIRE - PHQ9: SUM OF ALL RESPONSES TO PHQ QUESTIONS 1-9: 0

## 2020-06-16 NOTE — TELEPHONE ENCOUNTER
Patient answered the PHQ9 questions and has an appt With Gerardo on 6/19    Does have enough meds until Friday

## 2020-06-19 ENCOUNTER — VIRTUAL VISIT (OUTPATIENT)
Dept: FAMILY MEDICINE | Facility: CLINIC | Age: 30
End: 2020-06-19
Payer: COMMERCIAL

## 2020-06-19 DIAGNOSIS — F33.1 MODERATE EPISODE OF RECURRENT MAJOR DEPRESSIVE DISORDER (H): ICD-10-CM

## 2020-06-19 PROCEDURE — 99213 OFFICE O/P EST LOW 20 MIN: CPT | Mod: 95 | Performed by: PHYSICIAN ASSISTANT

## 2020-06-19 RX ORDER — CITALOPRAM HYDROBROMIDE 20 MG/1
20 TABLET ORAL DAILY
Qty: 90 TABLET | Refills: 3 | Status: SHIPPED | OUTPATIENT
Start: 2020-06-19 | End: 2023-03-30

## 2020-06-19 ASSESSMENT — ENCOUNTER SYMPTOMS
CONSTITUTIONAL NEGATIVE: 1
CARDIOVASCULAR NEGATIVE: 1
MUSCULOSKELETAL NEGATIVE: 1
NEUROLOGICAL NEGATIVE: 1
GASTROINTESTINAL NEGATIVE: 1
RESPIRATORY NEGATIVE: 1
EYES NEGATIVE: 1

## 2020-06-19 ASSESSMENT — PATIENT HEALTH QUESTIONNAIRE - PHQ9: SUM OF ALL RESPONSES TO PHQ QUESTIONS 1-9: 0

## 2020-06-19 NOTE — PROGRESS NOTES
"Cheryle Vazquez is a 30 year old female who is being evaluated via a billable video visit.      The patient has been notified of following:     \"This video visit will be conducted via a call between you and your physician/provider. We have found that certain health care needs can be provided without the need for an in-person physical exam.  This service lets us provide the care you need with a video conversation.  If a prescription is necessary we can send it directly to your pharmacy.  If lab work is needed we can place an order for that and you can then stop by our lab to have the test done at a later time.    Video visits are billed at different rates depending on your insurance coverage.  Please reach out to your insurance provider with any questions.    If during the course of the call the physician/provider feels a video visit is not appropriate, you will not be charged for this service.\"    Patient has given verbal consent for Video visit? Yes    How would you like to obtain your AVS? [employer]  Patient would like the video invitation sent by: Other e-mail: using [employer]    Will anyone else be joining your video visit? No      Subjective     Cheryle Vazquez is a 30 year old female who presents today via video visit for the following health issues:    HPI  Depression Followup    How are you doing with your depression since your last visit? No change    Are you having other symptoms that might be associated with depression? No    Have you had a significant life event?  No     Are you feeling anxious or having panic attacks?   No    Do you have any concerns with your use of alcohol or other drugs? No    Social History     Tobacco Use     Smoking status: Current Some Day Smoker     Packs/day: 0.25     Years: 0.50     Pack years: 0.12     Types: Cigarettes     Smokeless tobacco: Former User     Tobacco comment: Trying to stop---Hasn't smoked for a week.   Substance Use Topics     Alcohol use: No     Drug " use: Yes     Frequency: 3.0 times per week     Types: Marijuana     PHQ 11/1/2019 6/16/2020 6/19/2020   PHQ-9 Total Score 4 0 0   Q9: Thoughts of better off dead/self-harm past 2 weeks Several days Not at all Not at all     AAMIR-7 SCORE 5/10/2019 8/23/2019 11/1/2019   Total Score 7 5 5     ACT Total Scores 5/10/2019 11/1/2019 6/19/2020   ACT TOTAL SCORE (Goal Greater than or Equal to 20) 20 21 24   In the past 12 months, how many times did you visit the emergency room for your asthma without being admitted to the hospital? 0 0 0   In the past 12 months, how many times were you hospitalized overnight because of your asthma? 0 0 0             Suicide Assessment Five-step Evaluation and Treatment (SAFE-T)      How many servings of fruits and vegetables do you eat daily?  0-1    On average, how many sweetened beverages do you drink each day (Examples: soda, juice, sweet tea, etc.  Do NOT count diet or artificially sweetened beverages)?   1    How many days per week do you exercise enough to make your heart beat faster? 5    How many minutes a day do you exercise enough to make your heart beat faster? 30 - 60    How many days per week do you miss taking your medication? 0    She feels she is doing well - would like to continue the same medications.   Concerta is prescribed by another provider.           Video Start Time: 10:30 AM        Patient Active Problem List   Diagnosis     Depressed     Insertion of mirena IUD     Morbid obesity (H)     ADHD (attention deficit hyperactivity disorder)     Anxiety state     Marital/partner relational problem     Mild intermittent asthma without complication onset teens      Moderate episode of recurrent major depressive disorder (H)-issue of 1st Rx      Major depression in complete remission (H)     Primary insomnia     Past Surgical History:   Procedure Laterality Date     LIGATE ARTERY ETHMOID  2008       Social History     Tobacco Use     Smoking status: Current Some Day Smoker      Packs/day: 0.25     Years: 0.50     Pack years: 0.12     Types: Cigarettes     Smokeless tobacco: Former User     Tobacco comment: Trying to stop---Hasn't smoked for a week.   Substance Use Topics     Alcohol use: No     Family History   Problem Relation Age of Onset     Hypertension Paternal Grandmother      Asthma Mother      No Known Problems Father          Current Outpatient Medications   Medication Sig Dispense Refill     albuterol (PROAIR HFA/PROVENTIL HFA/VENTOLIN HFA) 108 (90 Base) MCG/ACT inhaler Inhale 1-2 puffs into the lungs every 6 hours 8 g 0     albuterol (PROVENTIL) (2.5 MG/3ML) 0.083% neb solution Take 1 vial (2.5 mg) by nebulization every 6 hours as needed 25 vial 0     citalopram (CELEXA) 20 MG tablet Take 1 tablet (20 mg) by mouth daily 90 tablet 3     Melatonin 10 MG TABS tablet Take 10 mg by mouth nightly as needed for sleep       methylphenidate (CONCERTA) 54 MG CR tablet        order for DME Equipment being ordered: Nebulizer 1 Device 0     fluticasone (FLONASE) 50 MCG/ACT nasal spray Spray 1 spray into both nostrils daily (Patient not taking: Reported on 6/19/2020) 16 g 3     guaiFENesin-codeine (ROBITUSSIN AC) 100-10 MG/5ML solution Take 5-10 mLs by mouth every 4 hours as needed for cough (Patient not taking: Reported on 6/19/2020) 236 mL 0     Allergies   Allergen Reactions     Amoxicillin Other (See Comments)     Caused yeast infection  Patient does not want to take Amoxicillin as it causes a yeast infection.  She has requested it be added to her allergy list.     Azithromycin Rash     Sulfamethoxazole-Trimethoprim Palpitations and Rash     Possible allergic reaction     Valacyclovir Palpitations and Rash     Possible allergic reaction       Reviewed and updated as needed this visit by Provider         Review of Systems   Constitutional: Negative.    HENT: Negative.    Eyes: Negative.    Respiratory: Negative.    Cardiovascular: Negative.    Gastrointestinal: Negative.    Genitourinary:  "Negative.    Musculoskeletal: Negative.    Skin: Negative.    Neurological: Negative.    Psychiatric/Behavioral:        As in HPI            Objective    There were no vitals taken for this visit.  Estimated body mass index is 51.49 kg/m  as calculated from the following:    Height as of 11/1/19: 1.626 m (5' 4\").    Weight as of 1/31/20: 136.1 kg (300 lb).  Physical Exam     GENERAL: Healthy, alert and no distress  EYES: Eyes grossly normal to inspection.  No discharge or erythema, or obvious scleral/conjunctival abnormalities.  RESP: No audible wheeze, cough, or visible cyanosis.  No visible retractions or increased work of breathing.    SKIN: Visible skin clear. No significant rash, abnormal pigmentation or lesions.  NEURO: Cranial nerves grossly intact.  Mentation and speech appropriate for age.  PSYCH: Mentation appears normal, affect normal/bright, judgement and insight intact, normal speech and appearance well-groomed.      Diagnostic Test Results:  none         Assessment & Plan   Problem List Items Addressed This Visit        Behavioral    Moderate episode of recurrent major depressive disorder (H)-issue of 1st Rx     Relevant Medications    citalopram (CELEXA) 20 MG tablet             Tobacco Cessation:   reports that she has been smoking cigarettes. She has a 0.13 pack-year smoking history. She has quit using smokeless tobacco.      BMI:   Estimated body mass index is 51.49 kg/m  as calculated from the following:    Height as of 11/1/19: 1.626 m (5' 4\").    Weight as of 1/31/20: 136.1 kg (300 lb).         Return in about 6 months (around 12/19/2020) for Preventive Care Visit.    Marely Wong PA-C  [facility]      Video-Visit Details    Type of service:  Video Visit    Video End Time:10:36 AM    Originating Location (pt. Location): Home    Distant Location (provider location):  [facility]     Platform used for Video Visit: " Doximity    Return in about 6 months (around 12/19/2020) for Preventive Care Visit.       Gerardo Wong PA-C

## 2020-06-20 ASSESSMENT — ASTHMA QUESTIONNAIRES: ACT_TOTALSCORE: 24

## 2020-07-06 ENCOUNTER — TELEPHONE (OUTPATIENT)
Dept: FAMILY MEDICINE | Facility: CLINIC | Age: 30
End: 2020-07-06

## 2020-07-06 ENCOUNTER — VIRTUAL VISIT (OUTPATIENT)
Dept: FAMILY MEDICINE | Facility: CLINIC | Age: 30
End: 2020-07-06
Payer: COMMERCIAL

## 2020-07-06 DIAGNOSIS — F90.9 ATTENTION DEFICIT HYPERACTIVITY DISORDER (ADHD), UNSPECIFIED ADHD TYPE: Primary | ICD-10-CM

## 2020-07-06 DIAGNOSIS — F33.1 MODERATE EPISODE OF RECURRENT MAJOR DEPRESSIVE DISORDER (H): Primary | ICD-10-CM

## 2020-07-06 PROBLEM — F51.01 PRIMARY INSOMNIA: Status: ACTIVE | Noted: 2019-11-04

## 2020-07-06 PROCEDURE — 99214 OFFICE O/P EST MOD 30 MIN: CPT | Mod: 95 | Performed by: PHYSICIAN ASSISTANT

## 2020-07-06 RX ORDER — ARIPIPRAZOLE 5 MG/1
5 TABLET ORAL DAILY
Qty: 30 TABLET | Refills: 1 | Status: SHIPPED | OUTPATIENT
Start: 2020-07-06 | End: 2020-08-07

## 2020-07-06 RX ORDER — METHYLPHENIDATE HYDROCHLORIDE 54 MG/1
54 TABLET ORAL EVERY MORNING
Qty: 30 TABLET | Refills: 0 | Status: SHIPPED | OUTPATIENT
Start: 2020-07-06 | End: 2020-09-28

## 2020-07-06 ASSESSMENT — ANXIETY QUESTIONNAIRES
GAD7 TOTAL SCORE: 6
7. FEELING AFRAID AS IF SOMETHING AWFUL MIGHT HAPPEN: SEVERAL DAYS
2. NOT BEING ABLE TO STOP OR CONTROL WORRYING: SEVERAL DAYS
3. WORRYING TOO MUCH ABOUT DIFFERENT THINGS: SEVERAL DAYS
1. FEELING NERVOUS, ANXIOUS, OR ON EDGE: SEVERAL DAYS
6. BECOMING EASILY ANNOYED OR IRRITABLE: MORE THAN HALF THE DAYS
IF YOU CHECKED OFF ANY PROBLEMS ON THIS QUESTIONNAIRE, HOW DIFFICULT HAVE THESE PROBLEMS MADE IT FOR YOU TO DO YOUR WORK, TAKE CARE OF THINGS AT HOME, OR GET ALONG WITH OTHER PEOPLE: SOMEWHAT DIFFICULT
5. BEING SO RESTLESS THAT IT IS HARD TO SIT STILL: NOT AT ALL

## 2020-07-06 ASSESSMENT — PATIENT HEALTH QUESTIONNAIRE - PHQ9
SUM OF ALL RESPONSES TO PHQ QUESTIONS 1-9: 7
5. POOR APPETITE OR OVEREATING: NOT AT ALL
SUM OF ALL RESPONSES TO PHQ QUESTIONS 1-9: 9

## 2020-07-06 NOTE — TELEPHONE ENCOUNTER
Controlled Substance Refill Request for   Requested Prescriptions   Pending Prescriptions Disp Refills     methylphenidate (CONCERTA) 54 MG CR tablet  Last Written Prescription Date:  n/a  Last Fill Quantity: n/a,  # refills: n/a   Last office visit: 6/19/2020 (VIRTUAL) with prescribing provider:  Fitterer   Future Office Visit:             There is no refill protocol information for this order          Problem List Complete:  No     PROVIDER TO CONSIDER COMPLETION OF PROBLEM LIST AND OVERVIEW/CONTROLLED SUBSTANCE AGREEMENT    Controlled substance agreement:   Encounter-Level CSA:    There are no encounter-level csa.     Patient-Level CSA:    There are no patient-level csa.         Last Urine Drug Screen: No results found for: CDAUT, No results found for: COMDAT, No results found for: THC13, PCP13, COC13, MAMP13, OPI13, AMP13, BZO13, TCA13, MTD13, BAR13, OXY13, PPX13, BUP13     Processing:  Rx to be electronically transmitted to pharmacy by provider      [email or web address]       checked in past 3 months?  Yes 7/6/2020 - no concerns. One outside prescriber for virtussin (Rolf Clay).    [place] reviewed:  reviewed- no concerns    [place]:  [email or web address]  Routing refill request to provider for review/approval because:  Drug not on the [facility] refill protocol

## 2020-07-06 NOTE — TELEPHONE ENCOUNTER
Reason for Call:  Medication or medication refill:    Do you use a [facility]?  Name of the pharmacy and phone number for the current request:  [id number] [address] AT [facility] [address]      Name of the medication requested: methylphenidate (CONCERTA) 54 MG CR tablet    Other request: The patient needs a refill for this medication.      Can we leave a detailed message on this number? YES    Phone number patient can be reached at: Home number on file [phone] (home)    Best Time: Any    Call taken on 7/6/2020 at 11:14 AM by Suma Burks

## 2020-07-06 NOTE — TELEPHONE ENCOUNTER
Patient was called. She is requesting medication for depression. PHQ-9 done with a score of 9. It was a 0 last time, but patient said she was hiding some things. She had a huge fight with family over the weekend and she wants to show them that she is willing to go get some help. Mother almost drove her to ER for being suicidal. She has suicidal thoughts several times a week and entertaining this thought sometimes, but never a plan, place, or date. She is planning to go see a therapist and made a video visit with NATALIE Wilcox within 2-3 hours. Patient agreed to call 911 if she feels suicidal again and unable to control her thoughts.     PHQ 6/16/2020 6/19/2020 7/6/2020   PHQ-9 Total Score 0 0 9   Q9: Thoughts of better off dead/self-harm past 2 weeks Not at all Not at all Several days

## 2020-07-06 NOTE — TELEPHONE ENCOUNTER
Reason for Call:  Other prescription    Detailed comments: The patient called and stated that she had a virtual visit with Gerardo Wong on 6/19 to discuss her depression. During that visit they didn't discuss going on medications but the patient feels like it would be beneficial if she were to start taking an anti-depressant.  She does not want Trazadone. She has taking that in the past and does not like the way it makes her feel.     Phone Number Patient can be reached at: Home number on file [phone] (home)    Best Time:  Any    Can we leave a detailed message on this number? YES    Call taken on 7/6/2020 at 11:15 AM by Suma Burks

## 2020-07-06 NOTE — ASSESSMENT & PLAN NOTE
[place] reviewed:  not reviewed/not due - last done on 7/6/2020, no concerns - one outside prescriber for virtussin (Rolf Clay).    [place]:  [email or web address]
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
## 2020-07-07 ASSESSMENT — ANXIETY QUESTIONNAIRES: GAD7 TOTAL SCORE: 6

## 2020-07-30 ENCOUNTER — VIRTUAL VISIT (OUTPATIENT)
Dept: PSYCHOLOGY | Facility: CLINIC | Age: 30
End: 2020-07-30
Attending: PHYSICIAN ASSISTANT
Payer: COMMERCIAL

## 2020-07-30 DIAGNOSIS — F33.1 MODERATE EPISODE OF RECURRENT MAJOR DEPRESSIVE DISORDER (H): Primary | ICD-10-CM

## 2020-07-30 PROCEDURE — 99207 ZZC NO BILLABLE SERVICE THIS VISIT: CPT

## 2020-07-30 ASSESSMENT — COLUMBIA-SUICIDE SEVERITY RATING SCALE - C-SSRS
3. HAVE YOU BEEN THINKING ABOUT HOW YOU MIGHT KILL YOURSELF?: YES
TOTAL  NUMBER OF INTERRUPTED ATTEMPTS LIFETIME: NO
4. HAVE YOU HAD THESE THOUGHTS AND HAD SOME INTENTION OF ACTING ON THEM?: NO
2. HAVE YOU ACTUALLY HAD ANY THOUGHTS OF KILLING YOURSELF?: YES
2. HAVE YOU ACTUALLY HAD ANY THOUGHTS OF KILLING YOURSELF LIFETIME?: YES
5. HAVE YOU STARTED TO WORK OUT OR WORKED OUT THE DETAILS OF HOW TO KILL YOURSELF? DO YOU INTEND TO CARRY OUT THIS PLAN?: NO
1. IN THE PAST MONTH, HAVE YOU WISHED YOU WERE DEAD OR WISHED YOU COULD GO TO SLEEP AND NOT WAKE UP?: YES
ATTEMPT PAST THREE MONTHS: NO
REASONS FOR IDEATION PAST MONTH: COMPLETELY TO END OR STOP THE PAIN (YOU COULDN'T GO ON LIVING WITH THE PAIN OR HOW YOU WERE FEELING)
1. IN THE PAST MONTH, HAVE YOU WISHED YOU WERE DEAD OR WISHED YOU COULD GO TO SLEEP AND NOT WAKE UP?: YES
TOTAL  NUMBER OF INTERRUPTED ATTEMPTS PAST 3 MONTHS: NO
REASONS FOR IDEATION LIFETIME: COMPLETELY TO END OR STOP THE PAIN (YOU COULDN'T GO ON LIVING WITH THE PAIN OR HOW YOU WERE FEELING)
4. HAVE YOU HAD THESE THOUGHTS AND HAD SOME INTENTION OF ACTING ON THEM?: NO
ATTEMPT LIFETIME: NO
5. HAVE YOU STARTED TO WORK OUT OR WORKED OUT THE DETAILS OF HOW TO KILL YOURSELF? DO YOU INTEND TO CARRY OUT THIS PLAN?: NO
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
## 2020-07-30 NOTE — PATIENT INSTRUCTIONS
"                                             Cheryle Vazquez     SAFETY PLAN:  Step 1: Warning signs / cues (Thoughts, images, mood, situation, behavior) that a crisis may be developing:    Thoughts: \"I don't matter\", \"People would be better off without me\", \"I can't do this anymore\", \"I just want this to end\" and \"Nothing makes it better\"    Images: obsessive thoughts of death or dying: Mehtods and flashbacks    Thinking Processes: ruminations (can't stop thinking about my problems): Trauma and racing thoughts    Mood: worsening depression, hopelessness, helplessness, intense worry, agitation and mood swings    Behaviors: isolating/withdrawing , can't stop crying, giving things away, saying good-bye, not taking care of myself, not taking care of my responsibilities and increasing frequency and duration of dissociation    Situations: relationship problems, trauma  and financial stress   Step 2: Coping strategies - Things I can do to take my mind off of my problems without contacting another person (relaxation technique, physical activity):    Distress Tolerance Strategies:  arts and crafts: Adult Coloring Book, play with my pet , change body temperature (ice pack/cold water)  and paced breathing/progressive muscle relaxation    Physical Activities: go for a walk, yoga, gardening, meditation, deep breathing and stretching     Focus on helpful thoughts:  \"This is temporary\", \"I will get through this\", \"It always passes\" and \"Ride the wave\"  Step 3: People and social settings that provide distraction:   Name: Charmaine - Best Friend   Name: Huber - Son   Step 4: Remind myself of people and things that are important to me and worth living for:  Son and pets  Step 5: When I am in crisis, I can ask these people to help me use my safety plan:   Name: Charmaine - Best Friend   Name: Cyndie - Mother  Step 6: Making the environment safe:     remove alcohol, remove drugs, dispose of old medications  and be around others  Step 7: " Professionals or agencies I can contact during a crisis:    [facility] Daytime Number: [phone]    Suicide Prevention Lifeline: 7-040-430-PGCM (1989)    Crisis Text Line Service (available 24 hours a day, 7 days a week): Text [place] to 461784  Local Crisis Services: [facility] [phone]    Call 911 or go to my nearest emergency department.   I helped develop this safety plan and agree to use it when needed.  I have been given a copy of this plan.      Client signature _________________________________________________________________  Today s date:  7/30/2020  Adapted from Safety Plan Template 2008 Yessi Warren and Zackery Tian is reprinted with the express permission of the authors.  No portion of the Safety Plan Template may be reproduced without the express, written permission.  You can contact the authors at [email or web address] or [email or web address].

## 2020-08-07 ENCOUNTER — VIRTUAL VISIT (OUTPATIENT)
Dept: PSYCHOLOGY | Facility: CLINIC | Age: 30
End: 2020-08-07
Payer: COMMERCIAL

## 2020-08-07 ENCOUNTER — VIRTUAL VISIT (OUTPATIENT)
Dept: FAMILY MEDICINE | Facility: CLINIC | Age: 30
End: 2020-08-07
Payer: COMMERCIAL

## 2020-08-07 DIAGNOSIS — F41.1 ANXIETY STATE: ICD-10-CM

## 2020-08-07 DIAGNOSIS — F90.9 ATTENTION DEFICIT HYPERACTIVITY DISORDER (ADHD), UNSPECIFIED ADHD TYPE: ICD-10-CM

## 2020-08-07 DIAGNOSIS — F33.0 MILD EPISODE OF RECURRENT MAJOR DEPRESSIVE DISORDER (H): Primary | ICD-10-CM

## 2020-08-07 DIAGNOSIS — F33.1 MODERATE EPISODE OF RECURRENT MAJOR DEPRESSIVE DISORDER (H): Primary | ICD-10-CM

## 2020-08-07 PROCEDURE — 90834 PSYTX W PT 45 MINUTES: CPT | Mod: 95

## 2020-08-07 PROCEDURE — 99214 OFFICE O/P EST MOD 30 MIN: CPT | Mod: 95 | Performed by: PHYSICIAN ASSISTANT

## 2020-08-07 RX ORDER — LISDEXAMFETAMINE DIMESYLATE 50 MG/1
50 CAPSULE ORAL EVERY MORNING
Qty: 30 CAPSULE | Refills: 0 | Status: SHIPPED | OUTPATIENT
Start: 2020-08-07 | End: 2021-09-30

## 2020-08-07 RX ORDER — METHYLPHENIDATE HYDROCHLORIDE 54 MG/1
54 TABLET ORAL EVERY MORNING
Qty: 30 TABLET | Refills: 0 | Status: CANCELLED | OUTPATIENT
Start: 2020-08-07

## 2020-08-07 RX ORDER — ARIPIPRAZOLE 10 MG/1
10 TABLET ORAL DAILY
Qty: 30 TABLET | Refills: 1 | Status: SHIPPED | OUTPATIENT
Start: 2020-08-07 | End: 2021-03-02

## 2020-08-07 ASSESSMENT — ANXIETY QUESTIONNAIRES
5. BEING SO RESTLESS THAT IT IS HARD TO SIT STILL: NOT AT ALL
7. FEELING AFRAID AS IF SOMETHING AWFUL MIGHT HAPPEN: SEVERAL DAYS
GAD7 TOTAL SCORE: 9
1. FEELING NERVOUS, ANXIOUS, OR ON EDGE: MORE THAN HALF THE DAYS
IF YOU CHECKED OFF ANY PROBLEMS ON THIS QUESTIONNAIRE, HOW DIFFICULT HAVE THESE PROBLEMS MADE IT FOR YOU TO DO YOUR WORK, TAKE CARE OF THINGS AT HOME, OR GET ALONG WITH OTHER PEOPLE: SOMEWHAT DIFFICULT
6. BECOMING EASILY ANNOYED OR IRRITABLE: NEARLY EVERY DAY
2. NOT BEING ABLE TO STOP OR CONTROL WORRYING: SEVERAL DAYS
3. WORRYING TOO MUCH ABOUT DIFFERENT THINGS: MORE THAN HALF THE DAYS
4. TROUBLE RELAXING: NOT AT ALL

## 2020-08-07 ASSESSMENT — PATIENT HEALTH QUESTIONNAIRE - PHQ9: SUM OF ALL RESPONSES TO PHQ QUESTIONS 1-9: 8
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
## 2020-08-07 NOTE — PROGRESS NOTES
Progress Note    Patient Name: Cheryle Vazquez  Date: 08/07/2020         Service Type: Individual      Session Start Time: 1:33pm  Session End Time: 2:20pm     Session Length: 47 minutes     Session #: 2    Attendees: Client attended alone    Service Modality:  Video Visit:    Telemedicine Visit: The patient's condition can be safely assessed and treated via synchronous audio and visual telemedicine encounter.      Reason for Telemedicine Visit: Services only offered telehealth    Originating Site (Patient Location): Patient's home    Distant Site (Provider Location): Provider Remote Setting    Consent:  The patient/guardian has verbally consented to: the potential risks and benefits of telemedicine (video visit) versus in person care; bill my insurance or make self-payment for services provided; and responsibility for payment of non-covered services.     Patient would like the video invitation sent by: Send to e-mail at: [email or web address]     Mode of Communication:  Video Conference via [email or web address]    As the provider I attest to compliance with applicable laws and regulations related to telemedicine.     Treatment Plan Last Reviewed: 08/07/2020  PHQ-9 / AAMIR-7: 08/07/2020 (AAMIR 7), 07/30/2020 (PHQ 9)    DATA  Interactive Complexity: No  Crisis: No       Progress Since Last Session (Related to Symptoms / Goals / Homework):   Symptoms: No change The patient reported a stable mood.     Homework: Achieved / completed to satisfaction      Episode of Care Goals: No improvement - PREPARATION (Decided to change - considering how); Intervened by negotiating a change plan and determining options / strategies for behavior change, identifying triggers, exploring social supports, and working towards setting a date to begin behavior change     Current / Ongoing Stressors and Concerns:   The patient presented to the session with a bright affect, but reports that her mood has been  the same as it was since last week. Provider and the patient discussed the patient's treatment goals and co-developed a treatment plan for the patient.      Treatment Objective(s) Addressed in This Session:   Treatment developed in session today.      Intervention:   Motivational Interviewing    MI Intervention: Co-Developed Goal: Treatment Plan     Change Talk Expressed by the Patient: Committment to change    Provider Response to Change Talk: E - Evoked more info from patient about behavior change, A - Affirmed patient's thoughts, decisions, or attempts at behavior change, R - Reflected patient's change talk and S - Summarized patient's change talk statements          ASSESSMENT: Current Emotional / Mental Status (status of significant symptoms):   Risk status (Self / Other harm or suicidal ideation)   Patient denies current fears or concerns for personal safety.   Patient reports the following current or recent suicidal ideation or behaviors: The patient reported SI thoughts have decreased but are still present. .   Patient denies current or recent homicidal ideation or behaviors.   Patient denies current or recent self injurious behavior or ideation.   Patient denies other safety concerns.   Patient reports there has been no change in risk factors since their last session.     Patient reports there has been no change in protective factors since their last session.     Recommended that patient call 911 or go to the local ED should there be a change in any of these risk factors.     Appearance:   Appropriate    Eye Contact:   Fair    Psychomotor Behavior: Normal    Attitude:   Playful Friendly   Orientation:   All   Speech    Rate / Production: Talkative    Volume:  Normal    Mood:    Normal   Affect:    Bright    Thought Content:  Clear    Thought Form:  Coherent  Logical    Insight:    Fair      Medication Review:   Changes to psychiatric medications, see updated Medication List in EPIC.      Medication  Compliance:   Yes     Changes in Health Issues:   None reported     Chemical Use Review:   Substance Use: Chemical use reviewed, no active concerns identified      Tobacco Use: No current tobacco use.      Diagnosis:  1. Mild episode of recurrent major depressive disorder (H)        Collateral Reports Completed:   Not Applicable    PLAN: (Patient Tasks / Therapist Tasks / Other)  The patient has been scheduled for a follow up appointment on 8/12. The patient has been tasked with exercising at least one time over the next week to help with overall mood improvement.       JOSE ALEJANDRO CLEMENTE, [facility]  08/07/2020  Service Performed and Documented by [facility]-   Note reviewed and clinical supervision by JOSE ALEJANDRO Herman [facility] 9/25/2020                                                       ______________________________________________________________________    Treatment Plan    Patient's Name: Cheryle Vazquez  YOB: 1990    Date: 08/07/2020    DSM5 Diagnoses: 296.31 (F33.0) Major Depressive Disorder, Recurrent Episode, Mild With anxious distress  Psychosocial / Contextual Factors: Hx of trauma, hx of depression, psychosocial stressors including previous relationships (divorce)  WHODAS: 18    Referral / Collaboration:  Referral to another professional/service is not indicated at this time.    Anticipated number of session or this episode of care: 20      MeasurableTreatment Goal(s) related to diagnosis / functional impairment(s)  Goal 1: Patient will implement healthy lifestyle skills to aid in the improvement of her overall mood.     I will know I've met my goal when I am exercising regularly and not feeling down.     Objective #A (Patient Action)    Patient will identify at least three phsycial activities to implement into her daily schedule. .  Status: New - Date: 08/07/2020     Intervention(s)  Therapist will assign the patient a weekly goal in the context of completing physical activity  exercises.     Objective #B  Patient will implement healthier eating habits into her daily schedule. .  Status: New - Date: 08/07/2020     Intervention(s)  Therapist will assign the patient a weekly goal related to making healthier eating habits.     Objective #C  Patient will increase knowledge and understanding about the importance of healthy eating and how that affects mental health.  Status: New - Date: 8/7/20     Intervention(s)  Therapist will provide the patient with psycho-education related to healthy eating the mental health connection.      Goal 2: Patient will experience a decrease in anxiety symptoms as evidenced by a AAMIR 7 score of 5 or less.     I will know I've met my goal when I'm not as anxious.      Objective #A (Patient Action)    Status: New - Date: 08/07/2020     Patient will identify three fears / thoughts that contribute to feeling anxious.    Intervention(s)  Therapist will assign homework assign homework for the patient to track anxious thoughts.    Objective #B  Patient will identify three distraction and diversion activities and use those activities to decrease level of anxiety  .    Status: New - Date: 8/7/20     Intervention(s)  Therapist will teach the patient varying skills and techniques to be used as distraction skills and assign homework for practice outside of sessions.    Objective #C  Patient will use thought-stopping strategy daily to reduce intrusive thoughts.  Status: New - Date: 8/7/20     Intervention(s)  Therapist will teach the patient cognitive re-framing skills to challenge negative and anxious thoughts.      Goal 3: Patiient will develop a better understanding and knowledge of trauma while implementing effective skills to address functioning impairments related to trauma.     I will know I've met my goal when I situations related to my ex- does not interfere with my day to day functioning.      Objective #A (Patient Action)    Status: New - Date: 08/07/2020  "    Patient will identify and unpack at least two \"boxes\" related to her trauma. .    Intervention(s)  Therapist will guide the patient through porcessing traumatic events in the session..    Objective #B  Patient will implement mindfulness and grounding skills to help the patient regulate when processing trumatic experiences. .    Status: New - Date: 08/07/2020     Intervention(s)  Therapist will teach the patient mindfulness and grounding skills.         Patient has reviewed and agreed to the above plan.      JOSE ALEJANDRO CLEMENTE, [facility]  August 7, 2020  Treatment plan reviewed and clinical supervision by JOSE ALEJANDRO Herman [facility] 9/25/2020    "

## 2020-08-08 ASSESSMENT — ANXIETY QUESTIONNAIRES: GAD7 TOTAL SCORE: 9

## 2020-08-12 ENCOUNTER — VIRTUAL VISIT (OUTPATIENT)
Dept: PSYCHOLOGY | Facility: CLINIC | Age: 30
End: 2020-08-12
Payer: COMMERCIAL

## 2020-08-12 DIAGNOSIS — F33.1 MODERATE EPISODE OF RECURRENT MAJOR DEPRESSIVE DISORDER (H): Primary | ICD-10-CM

## 2020-08-12 PROCEDURE — 90791 PSYCH DIAGNOSTIC EVALUATION: CPT | Mod: 95
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
## 2020-09-24 ENCOUNTER — VIRTUAL VISIT (OUTPATIENT)
Dept: PSYCHOLOGY | Facility: CLINIC | Age: 30
End: 2020-09-24
Payer: COMMERCIAL

## 2020-09-24 DIAGNOSIS — F33.0 MILD EPISODE OF RECURRENT MAJOR DEPRESSIVE DISORDER (H): Primary | ICD-10-CM

## 2020-09-24 PROCEDURE — 90834 PSYTX W PT 45 MINUTES: CPT | Mod: 95

## 2020-09-24 ASSESSMENT — ANXIETY QUESTIONNAIRES
7. FEELING AFRAID AS IF SOMETHING AWFUL MIGHT HAPPEN: NOT AT ALL
2. NOT BEING ABLE TO STOP OR CONTROL WORRYING: SEVERAL DAYS
6. BECOMING EASILY ANNOYED OR IRRITABLE: MORE THAN HALF THE DAYS
GAD7 TOTAL SCORE: 8
5. BEING SO RESTLESS THAT IT IS HARD TO SIT STILL: NOT AT ALL
4. TROUBLE RELAXING: NOT AT ALL
1. FEELING NERVOUS, ANXIOUS, OR ON EDGE: NEARLY EVERY DAY
3. WORRYING TOO MUCH ABOUT DIFFERENT THINGS: MORE THAN HALF THE DAYS

## 2020-09-24 ASSESSMENT — PATIENT HEALTH QUESTIONNAIRE - PHQ9: SUM OF ALL RESPONSES TO PHQ QUESTIONS 1-9: 8

## 2020-09-24 NOTE — PROGRESS NOTES
Progress Note    Patient Name: Cheryle Vazquez  Date: 9/24/2020         Service Type: Individual      Session Start Time: 11:05am  Session End Time: 11:55am     Session Length: 45 minutes     Session #: 4    Attendees: Client attended alone    Service Modality:  Video Visit:    Telemedicine Visit: The patient's condition can be safely assessed and treated via synchronous audio and visual telemedicine encounter.      Reason for Telemedicine Visit: Services only offered telehealth    Originating Site (Patient Location): Patient's home    Distant Site (Provider Location): Provider Remote Setting    Consent:  The patient/guardian has verbally consented to: the potential risks and benefits of telemedicine (Phone) versus in person care; bill my insurance or make self-payment for services provided; and responsibility for payment of non-covered services.     Mode of Communication:  Phone    As the provider I attest to compliance with applicable laws and regulations related to telemedicine.     Treatment Plan Last Reviewed: 08/07/2020  PHQ-9 / AAMIR-7: 08/07/2020 (AAMIR 7), 07/30/2020 (PHQ 9)    DATA  Interactive Complexity: No  Crisis: No       Progress Since Last Session (Related to Symptoms / Goals / Homework):   Symptoms: Worsening Patient reported an increase in anxiety symptoms.    Homework: N/A      Episode of Care Goals: No improvement - PREPARATION (Decided to change - considering how); Intervened by negotiating a change plan and determining options / strategies for behavior change, identifying triggers, exploring social supports, and working towards setting a date to begin behavior change     Current / Ongoing Stressors and Concerns:  Ongoing: Patient reported a hx of trauma related to her ex- and ongoing anxiety symptoms.    Current: The patient reported ongoing depression and anxiety symptoms, with some symptoms worsening. The patient stated that she has been  having difficulty with functioning, including completing tasks around the house. Clinician and patient processed some of the patient's feelings and behaviors and co-developed a goal for the patient over the next week.      Treatment Objective(s) Addressed in This Session:   Patient will identify at least three phsycial activities to implement into her daily schedule.  Patient will implement healthier eating habits into her daily schedule.  Patient will identify three fears / thoughts that contribute to feeling anxious.  Patient will identify three distraction and diversion activities and use those activities to decrease level of anxiety  .        Intervention:   Motivational Interviewing    MI Intervention: Co-Developed Goal: clean work desk     Change Talk Expressed by the Patient: Committment to change    Provider Response to Change Talk: E - Evoked more info from patient about behavior change, A - Affirmed patient's thoughts, decisions, or attempts at behavior change, R - Reflected patient's change talk and S - Summarized patient's change talk statements          ASSESSMENT: Current Emotional / Mental Status (status of significant symptoms):   Risk status (Self / Other harm or suicidal ideation)   Patient denies current fears or concerns for personal safety.   Patient reports the following current or recent suicidal ideation or behaviors: The patient reported SI thoughts have decreased but are still present. .   Patient denies current or recent homicidal ideation or behaviors.   Patient denies current or recent self injurious behavior or ideation.   Patient denies other safety concerns.   Patient reports there has been no change in risk factors since their last session.     Patient reports there has been no change in protective factors since their last session.     A safety and risk management plan has been developed including: Patient consented to co-developed safety plan.  Safety and risk management plan was  completed.  Patient agreed to use safety plan should any safety concerns arise.  A copy was given to the patient.     Appearance:   Unable to assess over the phone.    Eye Contact:   Unable to assess over the phone.    Psychomotor Behavior: Unable to assess over the phone.    Attitude:   Playful Friendly   Orientation:   All   Speech    Rate / Production: Talkative    Volume:  Normal    Mood:    Normal   Affect:    Bright    Thought Content:  Clear    Thought Form:  Coherent  Logical    Insight:    Fair      Medication Review:   No changes to current psychiatric medication(s)     Medication Compliance:   Yes     Changes in Health Issues:   None reported     Chemical Use Review:   Substance Use: Chemical use reviewed, no active concerns identified      Tobacco Use: No current tobacco use.      Diagnosis:  1. Mild episode of recurrent major depressive disorder (H)        Collateral Reports Completed:   Not Applicable    PLAN: (Patient Tasks / Therapist Tasks / Other)  The patient has been scheduled for a follow up appointment on 10/1. The patient has been tasked with utilizing anxiety coping skills as discussed in session. The patient also developed a small goal of cleaning her desk area in her home.      JOSE ALEJANDRO CLEMENTE, [facility]  09/24/2020    Service Performed and Documented by SHERRI-   Note reviewed and clinical supervision by JOSE ALEJANDRO Herman [facility] 10/1/2020  ______________________________________________________________________    Treatment Plan    Patient's Name: Cheryle Vazquez  YOB: 1990    Date: 08/07/2020    DSM5 Diagnoses: 296.31 (F33.0) Major Depressive Disorder, Recurrent Episode, Mild With anxious distress  Psychosocial / Contextual Factors: Hx of trauma, hx of depression, psychosocial stressors including previous relationships (divorce)  WHODAS: 18    Referral / Collaboration:  Referral to another professional/service is not indicated at this time.    Anticipated number of  session or this episode of care: 20      MeasurableTreatment Goal(s) related to diagnosis / functional impairment(s)  Goal 1: Patient will implement healthy lifestyle skills to aid in the improvement of her overall mood.     I will know I've met my goal when I am exercising regularly and not feeling down.     Objective #A (Patient Action)    Patient will identify at least three phsycial activities to implement into her daily schedule. .  Status: New - Date: 08/07/2020     Intervention(s)  Therapist will assign the patient a weekly goal in the context of completing physical activity exercises.     Objective #B  Patient will implement healthier eating habits into her daily schedule. .  Status: New - Date: 08/07/2020     Intervention(s)  Therapist will assign the patient a weekly goal related to making healthier eating habits.     Objective #C  Patient will increase knowledge and understanding about the importance of healthy eating and how that affects mental health.  Status: New - Date: 8/7/20     Intervention(s)  Therapist will provide the patient with psycho-education related to healthy eating the mental health connection.      Goal 2: Patient will experience a decrease in anxiety symptoms as evidenced by a AAMIR 7 score of 5 or less.     I will know I've met my goal when I'm not as anxious.      Objective #A (Patient Action)    Status: New - Date: 08/07/2020     Patient will identify three fears / thoughts that contribute to feeling anxious.    Intervention(s)  Therapist will assign homework assign homework for the patient to track anxious thoughts.    Objective #B  Patient will identify three distraction and diversion activities and use those activities to decrease level of anxiety  .    Status: New - Date: 8/7/20     Intervention(s)  Therapist will teach the patient varying skills and techniques to be used as distraction skills and assign homework for practice outside of sessions.    Objective #C  Patient will use  "thought-stopping strategy daily to reduce intrusive thoughts.  Status: New - Date: 8/7/20     Intervention(s)  Therapist will teach the patient cognitive re-framing skills to challenge negative and anxious thoughts.      Goal 3: Patiient will develop a better understanding and knowledge of trauma while implementing effective skills to address functioning impairments related to trauma.     I will know I've met my goal when I situations related to my ex- does not interfere with my day to day functioning.      Objective #A (Patient Action)    Status: New - Date: 08/07/2020     Patient will identify and unpack at least two \"boxes\" related to her trauma. .    Intervention(s)  Therapist will guide the patient through porcessing traumatic events in the session..    Objective #B  Patient will implement mindfulness and grounding skills to help the patient regulate when processing trumatic experiences.    Status: New - Date: 08/07/2020     Intervention(s)  Therapist will teach the patient mindfulness and grounding skills.         Patient has reviewed and agreed to the above plan.      JOSE ALEJANDRO CLEMENTE, [facility]  August 7, 2020  Treatment plan  reviewed and clinical supervision by JOSE ALEJANDRO Herman [facility] 10/1/2020  "

## 2020-09-25 ASSESSMENT — ANXIETY QUESTIONNAIRES: GAD7 TOTAL SCORE: 8

## 2020-09-28 ENCOUNTER — VIRTUAL VISIT (OUTPATIENT)
Dept: FAMILY MEDICINE | Facility: CLINIC | Age: 30
End: 2020-09-28
Payer: COMMERCIAL

## 2020-09-28 DIAGNOSIS — F41.1 ANXIETY STATE: ICD-10-CM

## 2020-09-28 DIAGNOSIS — F33.1 MODERATE EPISODE OF RECURRENT MAJOR DEPRESSIVE DISORDER (H): Primary | ICD-10-CM

## 2020-09-28 DIAGNOSIS — S99.911A ANKLE INJURY, RIGHT, INITIAL ENCOUNTER: ICD-10-CM

## 2020-09-28 DIAGNOSIS — F90.9 ATTENTION DEFICIT HYPERACTIVITY DISORDER (ADHD), UNSPECIFIED ADHD TYPE: ICD-10-CM

## 2020-09-28 PROCEDURE — 99214 OFFICE O/P EST MOD 30 MIN: CPT | Mod: 95 | Performed by: PHYSICIAN ASSISTANT

## 2020-09-28 RX ORDER — LISDEXAMFETAMINE DIMESYLATE 50 MG/1
50 CAPSULE ORAL DAILY
Qty: 30 CAPSULE | Refills: 0 | Status: SHIPPED | OUTPATIENT
Start: 2020-09-28 | End: 2020-10-28

## 2020-09-28 RX ORDER — LISDEXAMFETAMINE DIMESYLATE 50 MG/1
50 CAPSULE ORAL DAILY
Qty: 30 CAPSULE | Refills: 0 | Status: SHIPPED | OUTPATIENT
Start: 2020-10-29 | End: 2020-11-28

## 2020-09-28 RX ORDER — LISDEXAMFETAMINE DIMESYLATE 50 MG/1
50 CAPSULE ORAL DAILY
Qty: 30 CAPSULE | Refills: 0 | Status: SHIPPED | OUTPATIENT
Start: 2020-11-29 | End: 2020-12-29

## 2020-09-28 ASSESSMENT — ENCOUNTER SYMPTOMS
RESPIRATORY NEGATIVE: 1
CONSTITUTIONAL NEGATIVE: 1
EYES NEGATIVE: 1
CARDIOVASCULAR NEGATIVE: 1
NEUROLOGICAL NEGATIVE: 1
GASTROINTESTINAL NEGATIVE: 1

## 2020-09-28 ASSESSMENT — ANXIETY QUESTIONNAIRES
5. BEING SO RESTLESS THAT IT IS HARD TO SIT STILL: NOT AT ALL
2. NOT BEING ABLE TO STOP OR CONTROL WORRYING: SEVERAL DAYS
3. WORRYING TOO MUCH ABOUT DIFFERENT THINGS: SEVERAL DAYS
1. FEELING NERVOUS, ANXIOUS, OR ON EDGE: SEVERAL DAYS
6. BECOMING EASILY ANNOYED OR IRRITABLE: SEVERAL DAYS
IF YOU CHECKED OFF ANY PROBLEMS ON THIS QUESTIONNAIRE, HOW DIFFICULT HAVE THESE PROBLEMS MADE IT FOR YOU TO DO YOUR WORK, TAKE CARE OF THINGS AT HOME, OR GET ALONG WITH OTHER PEOPLE: SOMEWHAT DIFFICULT

## 2020-09-28 ASSESSMENT — PATIENT HEALTH QUESTIONNAIRE - PHQ9
SUM OF ALL RESPONSES TO PHQ QUESTIONS 1-9: 6
5. POOR APPETITE OR OVEREATING: NOT AT ALL

## 2020-09-28 NOTE — PROGRESS NOTES
"Cheryle Vazquez is a 30 year old female who is being evaluated via a billable video visit.      The patient has been notified of following:     \"This video visit will be conducted via a call between you and your physician/provider. We have found that certain health care needs can be provided without the need for an in-person physical exam.  This service lets us provide the care you need with a video conversation.  If a prescription is necessary we can send it directly to your pharmacy.  If lab work is needed we can place an order for that and you can then stop by our lab to have the test done at a later time.    Video visits are billed at different rates depending on your insurance coverage.  Please reach out to your insurance provider with any questions.    If during the course of the call the physician/provider feels a video visit is not appropriate, you will not be charged for this service.\"    Patient has given verbal consent for Video visit? Yes  How would you like to obtain your AVS? MyChart  If you are dropped from the video visit, the video invite should be resent to: text to cell  Will anyone else be joining your video visit? No      Subjective     Cheryle Vazquez is a 30 year old female who presents today via video visit for the following health issues:    HPI    Depression and Anxiety Follow-Up    How are you doing with your depression since your last visit? Worsened     How are you doing with your anxiety since your last visit?  Worsened     Are you having other symptoms that might be associated with depression or anxiety? Yes:  see screenings     Have you had a significant life event? No     Do you have any concerns with your use of alcohol or other drugs? No    Social History     Tobacco Use     Smoking status: Current Some Day Smoker     Packs/day: 0.25     Years: 0.50     Pack years: 0.12     Types: Cigarettes     Smokeless tobacco: Former User     Tobacco comment: Trying to stop---Hasn't " smoked for a week.   Substance Use Topics     Alcohol use: Yes     Comment: minimal     Drug use: Yes     Frequency: 3.0 times per week     Types: Marijuana     PHQ 8/7/2020 9/24/2020 9/28/2020   PHQ-9 Total Score 8 8 6   Q9: Thoughts of better off dead/self-harm past 2 weeks Several days Not at all Not at all     AAMIR-7 SCORE 7/6/2020 8/7/2020 9/24/2020   Total Score 6 9 8     Last PHQ-9 9/28/2020   1.  Little interest or pleasure in doing things 2   2.  Feeling down, depressed, or hopeless 1   3.  Trouble falling or staying asleep, or sleeping too much 0   4.  Feeling tired or having little energy 1   5.  Poor appetite or overeating 1   6.  Feeling bad about yourself 1   7.  Trouble concentrating 0   8.  Moving slowly or restless 0   Q9: Thoughts of better off dead/self-harm past 2 weeks 0   PHQ-9 Total Score 6   Difficulty at work, home, or with people Somewhat difficult     AAMIR-7  9/28/2020   1. Feeling nervous, anxious, or on edge 1   2. Not being able to stop or control worrying 1   3. Worrying too much about different things 1   4. Trouble relaxing 0   5. Being so restless that it is hard to sit still 0   6. Becoming easily annoyed or irritable 1   7. Feeling afraid, as if something awful might happen -   AAMIR-7 Total Score -   If you checked any problems, how difficult have they made it for you to do your work, take care of things at home, or get along with other people? Somewhat difficult       Suicide Assessment Five-step Evaluation and Treatment (SAFE-T)      How many servings of fruits and vegetables do you eat daily?  0-1    On average, how many sweetened beverages do you drink each day (Examples: soda, juice, sweet tea, etc.  Do NOT count diet or artificially sweetened beverages)?   2    How many days per week do you exercise enough to make your heart beat faster? 3 or less    How many minutes a day do you exercise enough to make your heart beat faster? 9 or less    How many days per week do you miss  taking your medication? 0       Current Outpatient Medications   Medication     FLUoxetine (PROZAC) 20 MG capsule     lisdexamfetamine (VYVANSE) 50 MG capsule     [START ON 10/29/2020] lisdexamfetamine (VYVANSE) 50 MG capsule     [START ON 11/29/2020] lisdexamfetamine (VYVANSE) 50 MG capsule     albuterol (PROAIR HFA/PROVENTIL HFA/VENTOLIN HFA) 108 (90 Base) MCG/ACT inhaler     albuterol (PROVENTIL) (2.5 MG/3ML) 0.083% neb solution     ARIPiprazole (ABILIFY) 10 MG tablet     citalopram (CELEXA) 20 MG tablet     fluticasone (FLONASE) 50 MCG/ACT nasal spray     lisdexamfetamine (VYVANSE) 50 MG capsule     Melatonin 10 MG TABS tablet     order for DME     No current facility-administered medications for this visit.      She does not like the Abilify  She does not like taking it at night  She sometimes feels groggy in the morning with the Abilify    Patient tripped and fell injuring her ankle 4 days ago  Minimal swelling  Initially it was difficult to put weight on it, but now it is better  She has been icing, elevating, gradually returning to activity  It is her right foot, so she is not confident driving.     Video Start Time: 9:55 AM        Review of Systems   Constitutional: Negative.    HENT: Negative.    Eyes: Negative.    Respiratory: Negative.    Cardiovascular: Negative.    Gastrointestinal: Negative.    Genitourinary: Negative.    Musculoskeletal:        As in HPI   Skin: Negative.    Neurological: Negative.    Psychiatric/Behavioral:        As in HPI            Objective           Vitals:  No vitals were obtained today due to virtual visit.    Physical Exam     GENERAL: Healthy, alert and no distress  EYES: Eyes grossly normal to inspection.  No discharge or erythema, or obvious scleral/conjunctival abnormalities.  RESP: No audible wheeze, cough, or visible cyanosis.  No visible retractions or increased work of breathing.    SKIN: Visible skin clear. No significant rash, abnormal pigmentation or  lesions.  NEURO: Cranial nerves grossly intact.  Mentation and speech appropriate for age.  PSYCH: Mentation appears normal, affect normal/bright, judgement and insight intact, normal speech and appearance well-groomed.                ICD-10-CM    1. Moderate episode of recurrent major depressive disorder (H)  F33.1 FLUoxetine (PROZAC) 20 MG capsule   2. Anxiety state  F41.1 FLUoxetine (PROZAC) 20 MG capsule   3. Attention deficit hyperactivity disorder (ADHD), unspecified ADHD type  F90.9 lisdexamfetamine (VYVANSE) 50 MG capsule     lisdexamfetamine (VYVANSE) 50 MG capsule     lisdexamfetamine (VYVANSE) 50 MG capsule   4. Ankle injury, right, initial encounter  S99.911A         Plan for the medication switch -   1. Take a half dose of the Abilify and the citalopram for one week.   2. After the week, completely stop both medications and START fluoxetine at 20 mg daily.   3. After the second week, increase the fluoxetine to 40 mg daily and take that dose for 3 weeks.  Then schedule a recheck with me or Asha Bone.     The ankle injury does not sound like a break - if she continues to have pain after about a week, or does not notice improvement this week - she should come in for an exam and XR.  Patient agrees with the plan.     Video-Visit Details    Type of service:  Video Visit    Video End Time:10:18 AM    Originating Location (pt. Location): Home    Distant Location (provider location):  [facility]     Platform used for Video Visit: Ari Wong PA-C

## 2020-09-30 ENCOUNTER — ANCILLARY PROCEDURE (OUTPATIENT)
Dept: GENERAL RADIOLOGY | Facility: CLINIC | Age: 30
End: 2020-09-30
Attending: PHYSICIAN ASSISTANT
Payer: COMMERCIAL

## 2020-09-30 ENCOUNTER — OFFICE VISIT (OUTPATIENT)
Dept: FAMILY MEDICINE | Facility: CLINIC | Age: 30
End: 2020-09-30
Payer: COMMERCIAL

## 2020-09-30 VITALS
OXYGEN SATURATION: 95 % | HEART RATE: 81 BPM | SYSTOLIC BLOOD PRESSURE: 108 MMHG | DIASTOLIC BLOOD PRESSURE: 62 MMHG | TEMPERATURE: 98.4 F | RESPIRATION RATE: 14 BRPM

## 2020-09-30 DIAGNOSIS — M79.661 PAIN OF RIGHT LOWER LEG: ICD-10-CM

## 2020-09-30 DIAGNOSIS — M25.571 PAIN IN JOINT, ANKLE AND FOOT, RIGHT: ICD-10-CM

## 2020-09-30 DIAGNOSIS — Z23 NEED FOR PROPHYLACTIC VACCINATION AND INOCULATION AGAINST INFLUENZA: Primary | ICD-10-CM

## 2020-09-30 DIAGNOSIS — M25.561 ACUTE PAIN OF RIGHT KNEE: ICD-10-CM

## 2020-09-30 PROCEDURE — 90686 IIV4 VACC NO PRSV 0.5 ML IM: CPT | Performed by: PHYSICIAN ASSISTANT

## 2020-09-30 PROCEDURE — 73562 X-RAY EXAM OF KNEE 3: CPT | Mod: RT

## 2020-09-30 PROCEDURE — 73610 X-RAY EXAM OF ANKLE: CPT | Mod: RT

## 2020-09-30 PROCEDURE — 90471 IMMUNIZATION ADMIN: CPT | Performed by: PHYSICIAN ASSISTANT

## 2020-09-30 PROCEDURE — 99214 OFFICE O/P EST MOD 30 MIN: CPT | Mod: 25 | Performed by: PHYSICIAN ASSISTANT

## 2020-09-30 ASSESSMENT — ENCOUNTER SYMPTOMS
CONSTITUTIONAL NEGATIVE: 1
NEUROLOGICAL NEGATIVE: 1
CARDIOVASCULAR NEGATIVE: 1
GASTROINTESTINAL NEGATIVE: 1
RESPIRATORY NEGATIVE: 1
EYES NEGATIVE: 1
PSYCHIATRIC NEGATIVE: 1

## 2020-09-30 NOTE — PROGRESS NOTES
Subjective     Cheryle Vazquez is a 30 year old female who presents to clinic today for the following health issues:    HPI       Musculoskeletal problem/pain  Onset/Duration: X6 days  Description  Location: leg and knee - right  Joint Swelling: YES  Redness: no  Pain: YES  Warmth: no  Intensity:  6/10  Progression of Symptoms:  worsening  Accompanying signs and symptoms:   Fevers: no  Numbness/tingling/weakness: YES  History  Trauma to the area: YES  Recent illness:  no  Previous similar problem: no  Previous evaluation:  no  Precipitating or alleviating factors:  Aggravating factors include: standing, walking and climbing stairs  Therapies tried and outcome: NSAID - Advil    Injury when falling off a tall stair - she is not sure what happened to her leg, however she was turned around and landed on her back during the fall.   She does not have any noticeable swelling  Pain starts in the lower thigh and radiates down to the ankle and foot  Diffuse numbness and tingling  Weakness - due to pain  Pain with driving (plantarflexion)        Past Medical History:   Diagnosis Date     ADHD (attention deficit hyperactivity disorder)      Depressive disorder        Past Surgical History:   Procedure Laterality Date     LIGATE ARTERY ETHMOID  2008       Family History   Problem Relation Age of Onset     Hypertension Paternal Grandmother      Asthma Mother      No Known Problems Father        Social History     Tobacco Use     Smoking status: Current Some Day Smoker     Packs/day: 0.25     Years: 0.50     Pack years: 0.12     Types: Cigarettes     Smokeless tobacco: Former User     Tobacco comment: Trying to stop---Hasn't smoked for a week.   Substance Use Topics     Alcohol use: Yes     Comment: minimal        Current Outpatient Medications   Medication     albuterol (PROAIR HFA/PROVENTIL HFA/VENTOLIN HFA) 108 (90 Base) MCG/ACT inhaler     albuterol (PROVENTIL) (2.5 MG/3ML) 0.083% neb solution     ARIPiprazole (ABILIFY)  10 MG tablet     citalopram (CELEXA) 20 MG tablet     FLUoxetine (PROZAC) 20 MG capsule     fluticasone (FLONASE) 50 MCG/ACT nasal spray     lisdexamfetamine (VYVANSE) 50 MG capsule     [START ON 10/29/2020] lisdexamfetamine (VYVANSE) 50 MG capsule     [START ON 11/29/2020] lisdexamfetamine (VYVANSE) 50 MG capsule     lisdexamfetamine (VYVANSE) 50 MG capsule     Melatonin 10 MG TABS tablet     order for DME     No current facility-administered medications for this visit.         Allergies   Allergen Reactions     Amoxicillin Other (See Comments)     Caused yeast infection  Patient does not want to take Amoxicillin as it causes a yeast infection.  She has requested it be added to her allergy list.     Azithromycin Rash     Sulfamethoxazole-Trimethoprim Palpitations and Rash     Possible allergic reaction     Valacyclovir Palpitations and Rash     Possible allergic reaction          Review of Systems   Constitutional: Negative.    HENT: Negative.    Eyes: Negative.    Respiratory: Negative.    Cardiovascular: Negative.    Gastrointestinal: Negative.    Genitourinary: Negative.    Musculoskeletal:        As in HPI   Neurological: Negative.    Psychiatric/Behavioral: Negative.          Objective    /62 (BP Location: Left arm, Patient Position: Sitting, Cuff Size: Adult Large)   Pulse 81   Temp 98.4  F (36.9  C) (Tympanic)   Resp 14   SpO2 95%   Physical Exam  Constitutional:       General: She is not in acute distress.     Appearance: She is well-developed. She is not diaphoretic.   HENT:      Head: Normocephalic.      Right Ear: External ear normal.      Left Ear: External ear normal.      Nose: Nose normal.   Eyes:      Conjunctiva/sclera: Conjunctivae normal.   Neck:      Musculoskeletal: Normal range of motion.   Pulmonary:      Effort: Pulmonary effort is normal.   Musculoskeletal:      Right knee: She exhibits normal range of motion and no swelling. Tenderness (diffuse) found.      Right ankle: She  exhibits normal range of motion, no swelling and no ecchymosis. Tenderness. Lateral malleolus, medial malleolus, AITFL, CF ligament, posterior TFL and proximal fibula tenderness found.      Right lower leg: She exhibits tenderness (diffuse). She exhibits no swelling. No edema.      Comments: Patient can maintain SLR on right.  Normal strength with plantarflexion/dorsiflexion.    Skin:     General: Skin is warm and dry.      Findings: No bruising or lesion.   Neurological:      Mental Status: She is alert and oriented to person, place, and time.   Psychiatric:         Judgment: Judgment normal.         Diagnostic Test Results:  No results found for this or any previous visit (from the past 24 hour(s)).  XR right knee - normal alignment, no fractures  XR right ankle - normal alignment, no fractures    Assessment & Plan   Problem List Items Addressed This Visit     None      Visit Diagnoses     Need for prophylactic vaccination and inoculation against influenza    -  Primary    Relevant Orders    INFLUENZA VACCINE IM > 6 MONTHS VALENT IIV4 [65746] (Completed)    Vaccine Administration, Initial [74601] (Completed)    Acute pain of right knee        Relevant Orders    XR Knee Right 3 Views    Pain in joint, ankle and foot, right        Relevant Orders    XR Ankle Right G/E 3 Views    Pain of right lower leg             Contusion right lower leg.   Difficult to assess patient today due to diffuse pain.  I recommend she return in 2 weeks to reassess motor function and perform a more comprehensive exam of the right knee and ankle.  RICE.     22 minutes were spent with the patient, greater than 50% of our time was spent in counseling.      There are no Patient Instructions on file for this visit.    Return in about 2 weeks (around 10/14/2020) for Musculoskeletal Recheck.    Marely Wong PA-C  [facility]

## 2020-10-01 ENCOUNTER — VIRTUAL VISIT (OUTPATIENT)
Dept: PSYCHOLOGY | Facility: CLINIC | Age: 30
End: 2020-10-01
Payer: COMMERCIAL

## 2020-10-01 DIAGNOSIS — F33.0 MILD EPISODE OF RECURRENT MAJOR DEPRESSIVE DISORDER (H): Primary | ICD-10-CM

## 2020-10-01 PROCEDURE — 90834 PSYTX W PT 45 MINUTES: CPT | Mod: TEL
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
## 2020-10-16 ENCOUNTER — VIRTUAL VISIT (OUTPATIENT)
Dept: PSYCHOLOGY | Facility: CLINIC | Age: 30
End: 2020-10-16
Payer: COMMERCIAL

## 2020-10-16 DIAGNOSIS — F33.0 MILD EPISODE OF RECURRENT MAJOR DEPRESSIVE DISORDER (H): Primary | ICD-10-CM

## 2020-10-16 PROCEDURE — 90834 PSYTX W PT 45 MINUTES: CPT | Mod: 95
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
## 2020-10-27 ENCOUNTER — VIRTUAL VISIT (OUTPATIENT)
Dept: PSYCHOLOGY | Facility: CLINIC | Age: 30
End: 2020-10-27
Payer: COMMERCIAL

## 2020-10-27 DIAGNOSIS — F33.0 MILD EPISODE OF RECURRENT MAJOR DEPRESSIVE DISORDER (H): Primary | ICD-10-CM

## 2020-10-27 PROCEDURE — 90834 PSYTX W PT 45 MINUTES: CPT | Mod: 95

## 2020-11-07 ENCOUNTER — OFFICE VISIT (OUTPATIENT)
Dept: URGENT CARE | Facility: URGENT CARE | Age: 30
End: 2020-11-07
Payer: COMMERCIAL

## 2020-11-07 ENCOUNTER — ANCILLARY PROCEDURE (OUTPATIENT)
Dept: GENERAL RADIOLOGY | Facility: CLINIC | Age: 30
End: 2020-11-07
Attending: FAMILY MEDICINE
Payer: COMMERCIAL

## 2020-11-07 VITALS
DIASTOLIC BLOOD PRESSURE: 82 MMHG | OXYGEN SATURATION: 99 % | RESPIRATION RATE: 16 BRPM | HEART RATE: 72 BPM | SYSTOLIC BLOOD PRESSURE: 130 MMHG

## 2020-11-07 DIAGNOSIS — S89.91XA KNEE INJURY, RIGHT, INITIAL ENCOUNTER: Primary | ICD-10-CM

## 2020-11-07 PROCEDURE — 73562 X-RAY EXAM OF KNEE 3: CPT | Mod: RT | Performed by: RADIOLOGY

## 2020-11-07 PROCEDURE — 99214 OFFICE O/P EST MOD 30 MIN: CPT | Performed by: FAMILY MEDICINE

## 2020-11-07 NOTE — PROGRESS NOTES
SUBJECTIVE:  Chief Complaint   Patient presents with     Knee Injury     Pt states her dog hipchecked R knee and now she is having numbness    .priyank presents with a chief complaint of right knee.  The injury occurred 1 hours ago.   The injury happened while at home.   How: trauma:  immediate pain  The patient complained of moderate pain and has had decreased ROM.    Pain exacerbated by weight-bearing and movement    He treated it initially with ice.   This is the first time this type of injury has occurred to this patient.     Past Medical History:   Diagnosis Date     ADHD (attention deficit hyperactivity disorder)      Depressive disorder      Allergies   Allergen Reactions     Amoxicillin Other (See Comments)     Caused yeast infection  Patient does not want to take Amoxicillin as it causes a yeast infection.  She has requested it be added to her allergy list.     Azithromycin Rash     Sulfamethoxazole-Trimethoprim Palpitations and Rash     Possible allergic reaction     Valacyclovir Palpitations and Rash     Possible allergic reaction     Social History     Tobacco Use     Smoking status: Current Some Day Smoker     Packs/day: 0.25     Years: 0.50     Pack years: 0.12     Types: Cigarettes     Smokeless tobacco: Former User     Tobacco comment: Trying to stop---Hasn't smoked for a week.   Substance Use Topics     Alcohol use: Yes     Comment: minimal       ROSINTEGUMENTARY/SKIN: NEGATIVE for open wound/bleeding and NEGATIVE for bruising    EXAM:/82   Pulse 72   Resp 16   SpO2 99%  Gen: healthy,alert,no distress  Extremity: knee has pain with palpation an rom.   There is not compromise to the distal circulation.  Pulses are +2 and CRT is brisk.GENERAL APPEARANCE: healthy, alert and no distress  EXTREMITIES: peripheral pulses normal  SKIN: no suspicious lesions or rashes  NEURO: Normal strength and tone, sensory exam grossly normal, mentation intact and speech normal    xr knee fx      ICD-10-CM    1.  Knee injury, right, initial encounter  S89.91XA XR Knee Right 3 Views     Knee Supplies Order for DME - ONLY FOR DME     Orthopedic & Spine  Referral     [facility]

## 2020-11-08 ENCOUNTER — MYC MEDICAL ADVICE (OUTPATIENT)
Dept: URGENT CARE | Facility: URGENT CARE | Age: 30
End: 2020-11-08
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
## 2020-11-12 ENCOUNTER — VIRTUAL VISIT (OUTPATIENT)
Dept: PSYCHOLOGY | Facility: CLINIC | Age: 30
End: 2020-11-12
Payer: COMMERCIAL

## 2020-11-12 DIAGNOSIS — F33.0 MILD EPISODE OF RECURRENT MAJOR DEPRESSIVE DISORDER (H): Primary | ICD-10-CM

## 2020-11-12 PROCEDURE — 90834 PSYTX W PT 45 MINUTES: CPT | Mod: 95
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
## 2020-11-16 ENCOUNTER — OFFICE VISIT (OUTPATIENT)
Dept: ORTHOPEDICS | Facility: CLINIC | Age: 30
End: 2020-11-16
Payer: COMMERCIAL

## 2020-11-16 VITALS
BODY MASS INDEX: 50.02 KG/M2 | WEIGHT: 293 LBS | SYSTOLIC BLOOD PRESSURE: 130 MMHG | DIASTOLIC BLOOD PRESSURE: 82 MMHG | HEIGHT: 64 IN

## 2020-11-16 DIAGNOSIS — S89.91XA INJURY OF RIGHT KNEE, INITIAL ENCOUNTER: Primary | ICD-10-CM

## 2020-11-16 PROCEDURE — 99204 OFFICE O/P NEW MOD 45 MIN: CPT | Performed by: ORTHOPAEDIC SURGERY

## 2020-11-16 ASSESSMENT — MIFFLIN-ST. JEOR: SCORE: 2147.44

## 2020-11-16 NOTE — LETTER
"    11/16/2020         RE: Cheryle Vazquez  [address]  [facility] [address]        Dear Colleague,    Thank you for referring your patient, Cheryle Vazquez, to the [facility] [place]. Please see a copy of my visit note below.    CHIEF COMPLAINT: Right knee pain    DIAGNOSIS: Right knee injury    DATE OF INJURY: 11/7/2020, dog ran into right knee    OCCUPATION/SPORT: Not working at present    HPI:   Cheryle Vazquez is a very pleasant 30 year old,  female who presents for evaluation of right knee pain. Symptoms started on 11/7/20. There was a precipitating event, she describes as she was walking through her backyard, and her dog zoomed along side her right side \"hip checking\" her right knee.  She demonstrates a possible valgus stress, and notes she dislocated her knee cap.  The pain is located to the medial aspect of the knee.  Pain is constant, pain varies based on activities.  Worst pain is rated a 10 of 10, and current pain is rated at 7 of 10. Symptoms are worsened by walking, weightbearing, jostling in the car. Symptoms are improved with rest, knee immobilizer, use of cane with ambulation, Ibuprofen prn. Patient has tried immobilization, rest, use of cane, and ibuprofen with moderate relief. Associated symptoms include swelling of the right knee that has improved over the last 2 weeks, sensation of instability.. Notably, the patient has had previous injury in 2016 due to ski injury where she crashed into the fence.  Injury in high school athletics where she wore a knee brace. No other concerns or complaints at this time.    PAST MEDICAL HISTORY:  1. Mild intermittent asthma  2. Depression  3. Anxiety  4. ADHD    CURRENT MEDICATIONS:  1. Albuterol inhalers  2. Abilify  3. Celexa  4. Prozac    ALLERGIES:   Allergies   Allergen Reactions     Amoxicillin Other (See Comments)     Caused yeast infection  Patient does not want to take Amoxicillin as it causes a yeast " "infection.  She has requested it be added to her allergy list.     Azithromycin Rash     Sulfamethoxazole-Trimethoprim Palpitations and Rash     Possible allergic reaction     Valacyclovir Palpitations and Rash     Possible allergic reaction     PAST SURGICAL HISTORY:  Past Surgical History:   Procedure Laterality Date     LIGATE ARTERY ETHMOID  2008     FAMILY HISTORY: No known family history of bleeding, clotting, or anesthesia related complications.    SOCIAL HISTORY: Patient lives in [place], [place] with her mother and her family. 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     TOXIC HABITS:  I spoke with Cheryle today regarding tobacco use and they informed me that they currently are using tobacco products. 3-4 cigarettes per day.    REVIEW OF SYSTEMS:  General: Denies fevers, chills, or night sweats.  Skin: Denies rashes or lesions.  Head: Denies headache or dizziness.  Eyes: Denies vision changes or eye pain.  Ears: Denies ear pain or decreased hearing.  Nose: Denies nose bleeding or sinus pain.  Mouth & Throat: Denies bleeding gums or sore throat.  Neck: Denies neck pain or stiffness.  Respiratory: Denies cough, wheezing, sob, or hemoptysis.  Cardiovascular: Denies chest pain, chest pressure, or palpitations.   Gastrointestinal: Denies abdominal pain, nausea, vomiting, diarrhea, or constipation.  Genitourinary: Denies difficulty or pain with urination.   Musculoskeletal: As noted above in the HPI, otherwise normal.  Neuro: Denies paralysis, weakness, paresthesias, or speech difficulty.  Lymphatics: Denies lymphadenopathy.  Psych: Denies anxiety, sadness, or irritability.    PHYSICAL EXAM:  Patient is 5' 4\" and weighs 318 lbs 0 oz. /82 (BP Location: Right arm, Patient Position: Chair, Cuff Size: Adult Large)   Ht 1.626 m (5' 4\")   Wt 144.2 kg (318 lb)   BMI 54.58 kg/m    Constitutional: Well-developed, well-nourished, healthy appearing " female.  Psychiatric:  Oriented to person, place and time.  Mood and affect congruent.  Skin: Warm, dry, and without rashes.  HEENT: Normocephalic, atraumatic. Extraocular movements intact. Moist mucous membranes.  Cardiac: Well perfused extremities, strong 2+ peripheral pulses. No edema.   Pulmonary: Non-labored respirations on room air without audible wheezes.   Abdomen: Soft, nontender.  Musculoskeletal: Patient ambulates with a slow, right antalgic gait with a cane in the left hand.  Supine examination of the right knee demonstrates a small joint effusion.  There is tenderness along the medial aspect patella.  There is no discernible patellar crepitus.  She is tenderness along the medial joint line.  She has range of motion from full extension to 100 degrees of flexion.  Stable anterior and posterior drawer.  1A Lachman.  Knee is stable to varus valgus stress at 0 30 degrees of knee flexion.  She is unable to perform a straight leg raise. The neurovascular exam is intact and skin is normal.     IMAGING:   All imaging was personally reviewed by me.    Right knee 3 view x-rays dated 11/7/2020 demonstrates a small minimally displaced fracture of the medial patellar facet with mild lateral subluxation and tilt of the patella suggestive of a possible medial patellofemoral ligament injury    IMPRESSION: 30 year old-year-old female with an acute right knee injury concerning for possible acute patellar dislocation and MPFL injury.     PLAN:   Given her significant disabling symptoms, moderate knee effusion, and mechanical symptoms that she describes, I would recommend obtaining a right knee MRI scan for further evaluation of her chondral surfaces as well as her ligamentous structures.  This will also confirm whether or not she sustained an MPFL rupture.  If this is an isolated medial patellofemoral ligament injury, I would recommend a course of physical therapy.  Depending on what the MRI demonstrates I will call her  with results and describe the next steps.    At the conclusion of the office visit, Cheryle verbally acknowledged that I answered all of her questions satisfactorily.          Again, thank you for allowing me to participate in the care of your patient.        Sincerely,        Jerrell Joseph MD

## 2020-11-16 NOTE — PROGRESS NOTES
"CHIEF COMPLAINT: Right knee pain    DIAGNOSIS: Right knee injury    DATE OF INJURY: 11/7/2020, dog ran into right knee    OCCUPATION/SPORT: Not working at present    HPI:   Cheryle Vazquez is a very pleasant 30 year old,  female who presents for evaluation of right knee pain. Symptoms started on 11/7/20. There was a precipitating event, she describes as she was walking through her backyard, and her dog zoomed along side her right side \"hip checking\" her right knee.  She demonstrates a possible valgus stress, and notes she dislocated her knee cap.  The pain is located to the medial aspect of the knee.  Pain is constant, pain varies based on activities.  Worst pain is rated a 10 of 10, and current pain is rated at 7 of 10. Symptoms are worsened by walking, weightbearing, jostling in the car. Symptoms are improved with rest, knee immobilizer, use of cane with ambulation, Ibuprofen prn. Patient has tried immobilization, rest, use of cane, and ibuprofen with moderate relief. Associated symptoms include swelling of the right knee that has improved over the last 2 weeks, sensation of instability.. Notably, the patient has had previous injury in 2016 due to ski injury where she crashed into the fence.  Injury in high school athletics where she wore a knee brace. No other concerns or complaints at this time.    PAST MEDICAL HISTORY:  1. Mild intermittent asthma  2. Depression  3. Anxiety  4. ADHD    CURRENT MEDICATIONS:  1. Albuterol inhalers  2. Abilify  3. Celexa  4. Prozac    ALLERGIES:   Allergies   Allergen Reactions     Amoxicillin Other (See Comments)     Caused yeast infection  Patient does not want to take Amoxicillin as it causes a yeast infection.  She has requested it be added to her allergy list.     Azithromycin Rash     Sulfamethoxazole-Trimethoprim Palpitations and Rash     Possible allergic reaction     Valacyclovir Palpitations and Rash     Possible allergic reaction     PAST SURGICAL HISTORY:  Past " "Surgical History:   Procedure Laterality Date     LIGATE ARTERY ETHMOID  2008     FAMILY HISTORY: No known family history of bleeding, clotting, or anesthesia related complications.    SOCIAL HISTORY: Patient lives in [place], [place] with her mother and her family. 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     TOXIC HABITS:  I spoke with Cheryle today regarding tobacco use and they informed me that they currently are using tobacco products. 3-4 cigarettes per day.    REVIEW OF SYSTEMS:  General: Denies fevers, chills, or night sweats.  Skin: Denies rashes or lesions.  Head: Denies headache or dizziness.  Eyes: Denies vision changes or eye pain.  Ears: Denies ear pain or decreased hearing.  Nose: Denies nose bleeding or sinus pain.  Mouth & Throat: Denies bleeding gums or sore throat.  Neck: Denies neck pain or stiffness.  Respiratory: Denies cough, wheezing, sob, or hemoptysis.  Cardiovascular: Denies chest pain, chest pressure, or palpitations.   Gastrointestinal: Denies abdominal pain, nausea, vomiting, diarrhea, or constipation.  Genitourinary: Denies difficulty or pain with urination.   Musculoskeletal: As noted above in the HPI, otherwise normal.  Neuro: Denies paralysis, weakness, paresthesias, or speech difficulty.  Lymphatics: Denies lymphadenopathy.  Psych: Denies anxiety, sadness, or irritability.    PHYSICAL EXAM:  Patient is 5' 4\" and weighs 318 lbs 0 oz. /82 (BP Location: Right arm, Patient Position: Chair, Cuff Size: Adult Large)   Ht 1.626 m (5' 4\")   Wt 144.2 kg (318 lb)   BMI 54.58 kg/m    Constitutional: Well-developed, well-nourished, healthy appearing female.  Psychiatric:  Oriented to person, place and time.  Mood and affect congruent.  Skin: Warm, dry, and without rashes.  HEENT: Normocephalic, atraumatic. Extraocular movements intact. Moist mucous membranes.  Cardiac: Well perfused extremities, strong 2+ peripheral pulses. No " edema.   Pulmonary: Non-labored respirations on room air without audible wheezes.   Abdomen: Soft, nontender.  Musculoskeletal: Patient ambulates with a slow, right antalgic gait with a cane in the left hand.  Supine examination of the right knee demonstrates a small joint effusion.  There is tenderness along the medial aspect patella.  There is no discernible patellar crepitus.  She is tenderness along the medial joint line.  She has range of motion from full extension to 100 degrees of flexion.  Stable anterior and posterior drawer.  1A Lachman.  Knee is stable to varus valgus stress at 0 30 degrees of knee flexion.  She is unable to perform a straight leg raise. The neurovascular exam is intact and skin is normal.     IMAGING:   All imaging was personally reviewed by me.    Right knee 3 view x-rays dated 11/7/2020 demonstrates a small minimally displaced fracture of the medial patellar facet with mild lateral subluxation and tilt of the patella suggestive of a possible medial patellofemoral ligament injury    IMPRESSION: 30 year old-year-old female with an acute right knee injury concerning for possible acute patellar dislocation and MPFL injury.     PLAN:   Given her significant disabling symptoms, moderate knee effusion, and mechanical symptoms that she describes, I would recommend obtaining a right knee MRI scan for further evaluation of her chondral surfaces as well as her ligamentous structures.  This will also confirm whether or not she sustained an MPFL rupture.  If this is an isolated medial patellofemoral ligament injury, I would recommend a course of physical therapy.  Depending on what the MRI demonstrates I will call her with results and describe the next steps.    At the conclusion of the office visit, Cheryle verbally acknowledged that I answered all of her questions satisfactorily.

## 2020-11-16 NOTE — PATIENT INSTRUCTIONS
1. Injury of right knee, initial encounter      Order for MRI right knee placed.    The [facility] team will call you to schedule your imaging exam in the next 1-2 days. You can also call them directly at [facility] [phone] ([address], [address], [place], [place]) to schedule your appointment.     Dr. Joseph will call you with results.    Call my office with any questions or concerns, [phone].

## 2020-11-20 ENCOUNTER — VIRTUAL VISIT (OUTPATIENT)
Dept: PSYCHOLOGY | Facility: CLINIC | Age: 30
End: 2020-11-20
Payer: COMMERCIAL

## 2020-11-20 DIAGNOSIS — F33.0 MILD EPISODE OF RECURRENT MAJOR DEPRESSIVE DISORDER (H): Primary | ICD-10-CM

## 2020-11-20 PROCEDURE — 90832 PSYTX W PT 30 MINUTES: CPT | Mod: 95

## 2020-11-20 ASSESSMENT — ANXIETY QUESTIONNAIRES
7. FEELING AFRAID AS IF SOMETHING AWFUL MIGHT HAPPEN: NOT AT ALL
5. BEING SO RESTLESS THAT IT IS HARD TO SIT STILL: NOT AT ALL
2. NOT BEING ABLE TO STOP OR CONTROL WORRYING: NEARLY EVERY DAY
GAD7 TOTAL SCORE: 9
1. FEELING NERVOUS, ANXIOUS, OR ON EDGE: SEVERAL DAYS
6. BECOMING EASILY ANNOYED OR IRRITABLE: MORE THAN HALF THE DAYS
3. WORRYING TOO MUCH ABOUT DIFFERENT THINGS: NEARLY EVERY DAY
4. TROUBLE RELAXING: NOT AT ALL

## 2020-11-20 ASSESSMENT — PATIENT HEALTH QUESTIONNAIRE - PHQ9: SUM OF ALL RESPONSES TO PHQ QUESTIONS 1-9: 5

## 2020-11-21 ASSESSMENT — ANXIETY QUESTIONNAIRES: GAD7 TOTAL SCORE: 9

## 2020-11-30 ENCOUNTER — HOSPITAL ENCOUNTER (OUTPATIENT)
Dept: MRI IMAGING | Facility: CLINIC | Age: 30
Discharge: HOME | End: 2020-11-30
Attending: ORTHOPAEDIC SURGERY | Admitting: ORTHOPAEDIC SURGERY
Payer: COMMERCIAL

## 2020-11-30 DIAGNOSIS — S89.91XA INJURY OF RIGHT KNEE, INITIAL ENCOUNTER: ICD-10-CM

## 2020-11-30 PROCEDURE — 73721 MRI JNT OF LWR EXTRE W/O DYE: CPT | Mod: RT

## 2020-11-30 PROCEDURE — 73721 MRI JNT OF LWR EXTRE W/O DYE: CPT | Mod: 26 | Performed by: RADIOLOGY

## 2020-12-01 DIAGNOSIS — J20.9 ACUTE BRONCHITIS, UNSPECIFIED ORGANISM: ICD-10-CM

## 2020-12-01 DIAGNOSIS — S83.004A PATELLAR DISLOCATION, RIGHT, INITIAL ENCOUNTER: Primary | ICD-10-CM

## 2020-12-01 DIAGNOSIS — F41.1 ANXIETY STATE: ICD-10-CM

## 2020-12-01 DIAGNOSIS — F33.1 MODERATE EPISODE OF RECURRENT MAJOR DEPRESSIVE DISORDER (H): ICD-10-CM

## 2020-12-01 NOTE — PROGRESS NOTES
Progress Note    Patient Name: Cheryle Vazquez  Date: 11/20/2020         Service Type: Individual      Session Start Time: 1:39pm  Session End Time: 2:15pm     Session Length: 34 minutes     Session #:  9    Attendees: Client attended alone    Service Modality:  Phone    Telemedicine Visit: The patient's condition can be safely assessed and treated via synchronous audio and visual telemedicine encounter.      Reason for Telemedicine Visit: Services only offered telehealth    Originating Site (Patient Location): Patient's home    Distant Site (Provider Location): Provider Remote Setting    Consent:  The patient/guardian has verbally consented to: the potential risks and benefits of telemedicine (Phone) versus in person care; bill my insurance or make self-payment for services provided; and responsibility for payment of non-covered services.     Mode of Communication:  Phone    As the provider I attest to compliance with applicable laws and regulations related to telemedicine.     Treatment Plan Last Reviewed: 11/12/2020  PHQ-9 / AAMIR-7: 11/20/2020    DATA  Interactive Complexity: No  Crisis: No       Progress Since Last Session (Related to Symptoms / Goals / Homework):   Symptoms: No change Patient reported continued anxiety symptoms with slight decrease.    Homework: Did not complete      Episode of Care Goals: No improvement - PREPARATION (Decided to change - considering how); Intervened by negotiating a change plan and determining options / strategies for behavior change, identifying triggers, exploring social supports, and working towards setting a date to begin behavior change     Current / Ongoing Stressors and Concerns:  Ongoing: Patient reported a hx of trauma related to her ex- and ongoing anxiety symptoms.    Current: Patient provided an update about her ongoing stressors related to COVID-19, including home-schooling her son. Clinician and patient  discussed the upcoming holiday season and current stressors for the patient.       Treatment Objective(s) Addressed in This Session:   Patient will identify at least three phsycial activities to implement into her daily schedule.  Patient will implement healthier eating habits into her daily schedule.  Patient will identify three fears / thoughts that contribute to feeling anxious.  Patient will identify three distraction and diversion activities and use those activities to decrease level of anxiety.        Intervention:   Motivational Interviewing    MI Intervention: Supported Autonomy, Collaboration, Evocation and Open-ended questions     Change Talk Expressed by the Patient: Committment to change    Provider Response to Change Talk: E - Evoked more info from patient about behavior change, A - Affirmed patient's thoughts, decisions, or attempts at behavior change, R - Reflected patient's change talk and S - Summarized patient's change talk statements    CBT: Discussed strategies to be able to identify trauma symptoms and techniques to cope with them.     ASSESSMENT: Current Emotional / Mental Status (status of significant symptoms):   Risk status (Self / Other harm or suicidal ideation)   Patient denies current fears or concerns for personal safety.   Patient reports the following current or recent suicidal ideation or behaviors: The patient reported SI thoughts have decreased but are still present. .   Patient denies current or recent homicidal ideation or behaviors.   Patient denies current or recent self injurious behavior or ideation.   Patient denies other safety concerns.   Patient reports there has been no change in risk factors since their last session.     Patient reports there has been no change in protective factors since their last session.     A safety and risk management plan has been developed including: Patient consented to co-developed safety plan.  Safety and risk management plan was completed.   Patient agreed to use safety plan should any safety concerns arise.  A copy was given to the patient.     Appearance:   Unable to assess over the phone.    Eye Contact:   Unable to assess over the phone.     Psychomotor Behavior: Unable to assess over the phone.    Attitude:   Playful Friendly   Orientation:   All   Speech    Rate / Production: Talkative    Volume:  Normal    Mood:    Normal   Affect:    Bright    Thought Content:  Clear    Thought Form:  Coherent  Logical    Insight:    Fair      Medication Review:   Changes to psychiatric medications, see updated Medication List in EPIC.      Medication Compliance:   Yes Patient recently re-started medication.     Changes in Health Issues:   Yes: Patient reported that she recently injured her knee.     Chemical Use Review:   Substance Use: Chemical use reviewed, no active concerns identified      Tobacco Use: No change in amount of tobacco use since last session.  Patient declined discussion at this time    Diagnosis:  1. Mild episode of recurrent major depressive disorder (H)        Collateral Reports Completed:   Not Applicable    PLAN: (Patient Tasks / Therapist Tasks / Other)  The patient has been scheduled for a follow up appointment on 12/4. The patient was not assigned any homework for the upcoming week as she will be attending medical appointments for an injury.      JOSE ALEJANDRO CLEMENTE, [facility]  11/20/2020  Service Performed and Documented by [facility]-   Note reviewed and clinical supervision by JOSE ALEJANDRO Herman [facility] 12/2/2020  ______________________________________________________________________    Treatment Plan    Patient's Name: Cheryle Vazquez  YOB: 1990    Date: 08/07/2020  Reviewed: 11/12/2020    DSM5 Diagnoses: 296.31 (F33.0) Major Depressive Disorder, Recurrent Episode, Mild With anxious distress  Psychosocial / Contextual Factors: Hx of trauma, hx of depression, psychosocial stressors including previous relationships  (divorce)  WHODAS: 18    Referral / Collaboration:  Referral to another professional/service is not indicated at this time.    Anticipated number of session or this episode of care: 20      MeasurableTreatment Goal(s) related to diagnosis / functional impairment(s)  Goal 1: Patient will implement healthy lifestyle skills to aid in the improvement of her overall mood.      I will know I've met my goal when I am exercising regularly and not feeling down.     Objective #A (Patient Action)    Patient will identify at least three phsycial activities to implement into her daily schedule. .  Status: Continued - Date(s):11/12/20     Intervention(s)  Therapist will assign the patient a weekly goal in the context of completing physical activity exercises.     Objective #B  Patient will implement healthier eating habits into her daily schedule. .  Status: Continued - Date(s): 11/12/20     Intervention(s)  Therapist will assign the patient a weekly goal related to making healthier eating habits.     Objective #C  Patient will increase knowledge and understanding about the importance of healthy eating and how that affects mental health.  Status: Continued - Date(s): 11/12/20      Intervention(s)  Therapist will provide the patient with psycho-education related to healthy eating the mental health connection.      Goal 2: Patient will experience a decrease in anxiety symptoms as evidenced by a AAMIR 7 score of 5 or less.      I will know I've met my goal when I'm not as anxious.      Objective #A (Patient Action)    Status: Continued - Date(s): 11/12/20     Patient will identify three fears / thoughts that contribute to feeling anxious.    Intervention(s)  Therapist will assign homework assign homework for the patient to track anxious thoughts.    Objective #B  Patient will identify three distraction and diversion activities and use those activities to decrease level of anxiety  .    Status: Continued - Date(s): 11/12/20  "    Intervention(s)  Therapist will teach the patient varying skills and techniques to be used as distraction skills and assign homework for practice outside of sessions.    Objective #C  Patient will use thought-stopping strategy daily to reduce intrusive thoughts.  Status: Continued - Date(s): 11/12/20     Intervention(s)  Therapist will teach the patient cognitive re-framing skills to challenge negative and anxious thoughts.      Goal 3: Patiient will develop a better understanding and knowledge of trauma while implementing effective skills to address functioning impairments related to trauma.      I will know I've met my goal when I situations related to my ex- does not interfere with my day to day functioning.      Objective #A (Patient Action)    Status: Continued - Date(s): 11/12/20     Patient will identify and unpack at least two \"boxes\" related to her trauma. .    Intervention(s)  Therapist will guide the patient through porcessing traumatic events in the session..    Objective #B  Patient will implement mindfulness and grounding skills to help the patient regulate when processing trumatic experiences.    Status: Continued - Date(s): 11/12/20     Intervention(s)  Therapist will teach the patient mindfulness and grounding skills.     Patient has reviewed and agreed to the above plan.      JOSE ALEJANDRO CLEMENTE, [facility]  November 12, 2020  Treatment plan reviewed and clinical supervision by JOSE ALEJANDRO Herman [facility] 11/10/2020  "

## 2020-12-02 RX ORDER — FLUOXETINE 40 MG/1
40 CAPSULE ORAL DAILY
Qty: 30 CAPSULE | Refills: 0 | Status: SHIPPED | OUTPATIENT
Start: 2020-12-02 | End: 2021-02-26

## 2020-12-02 RX ORDER — ALBUTEROL SULFATE 90 UG/1
1-2 AEROSOL, METERED RESPIRATORY (INHALATION) EVERY 6 HOURS
Qty: 8 G | Refills: 0 | Status: SHIPPED | OUTPATIENT
Start: 2020-12-02 | End: 2021-03-02

## 2020-12-02 NOTE — TELEPHONE ENCOUNTER
Routing refill request to provider for review/approval because:  Labs out of range:  PHQ-9    PHQ 9/24/2020 9/28/2020 11/20/2020   PHQ-9 Total Score 8 6 5   Q9: Thoughts of better off dead/self-harm past 2 weeks Not at all Not at all Not at all

## 2020-12-04 ENCOUNTER — VIRTUAL VISIT (OUTPATIENT)
Dept: PSYCHOLOGY | Facility: CLINIC | Age: 30
End: 2020-12-04
Payer: COMMERCIAL

## 2020-12-04 DIAGNOSIS — F33.0 MILD EPISODE OF RECURRENT MAJOR DEPRESSIVE DISORDER (H): Primary | ICD-10-CM

## 2020-12-04 PROCEDURE — 90834 PSYTX W PT 45 MINUTES: CPT | Mod: 95

## 2020-12-04 ASSESSMENT — PATIENT HEALTH QUESTIONNAIRE - PHQ9: SUM OF ALL RESPONSES TO PHQ QUESTIONS 1-9: 11

## 2020-12-06 ENCOUNTER — HEALTH MAINTENANCE LETTER (OUTPATIENT)
Age: 30
End: 2020-12-06

## 2020-12-10 ENCOUNTER — THERAPY VISIT (OUTPATIENT)
Dept: PHYSICAL THERAPY | Facility: CLINIC | Age: 30
End: 2020-12-10
Attending: ORTHOPAEDIC SURGERY
Payer: COMMERCIAL

## 2020-12-10 DIAGNOSIS — S83.091S: ICD-10-CM

## 2020-12-10 DIAGNOSIS — S83.004A PATELLAR DISLOCATION, RIGHT, INITIAL ENCOUNTER: ICD-10-CM

## 2020-12-10 DIAGNOSIS — M25.561 RIGHT KNEE PAIN: ICD-10-CM

## 2020-12-10 PROCEDURE — 97110 THERAPEUTIC EXERCISES: CPT | Mod: GP | Performed by: PHYSICAL THERAPIST

## 2020-12-10 PROCEDURE — 97161 PT EVAL LOW COMPLEX 20 MIN: CPT | Mod: GP | Performed by: PHYSICAL THERAPIST

## 2020-12-10 ASSESSMENT — ACTIVITIES OF DAILY LIVING (ADL)
AS_A_RESULT_OF_YOUR_KNEE_INJURY,_HOW_WOULD_YOU_RATE_YOUR_CURRENT_LEVEL_OF_DAILY_ACTIVITY?: ABNORMAL
WEAKNESS: THE SYMPTOM AFFECTS MY ACTIVITY MODERATELY
SQUAT: NOT ANSWERED
HOW_WOULD_YOU_RATE_THE_OVERALL_FUNCTION_OF_YOUR_KNEE_DURING_YOUR_USUAL_DAILY_ACTIVITIES?: ABNORMAL
RISE FROM A CHAIR: ACTIVITY IS SOMEWHAT DIFFICULT
SWELLING: THE SYMPTOM AFFECTS MY ACTIVITY SLIGHTLY
PAIN: THE SYMPTOM AFFECTS MY ACTIVITY SLIGHTLY
STIFFNESS: NOT ANSWERED
LIMPING: THE SYMPTOM AFFECTS MY ACTIVITY MODERATELY
GO DOWN STAIRS: ACTIVITY IS SOMEWHAT DIFFICULT
GO UP STAIRS: ACTIVITY IS VERY DIFFICULT
HOW_WOULD_YOU_RATE_THE_CURRENT_FUNCTION_OF_YOUR_KNEE_DURING_YOUR_USUAL_DAILY_ACTIVITIES_ON_A_SCALE_FROM_0_TO_100_WITH_100_BEING_YOUR_LEVEL_OF_KNEE_FUNCTION_PRIOR_TO_YOUR_INJURY_AND_0_BEING_THE_INABILITY_TO_PERFORM_ANY_OF_YOUR_USUAL_DAILY_ACTIVITIES?: 80
KNEE_ACTIVITY_OF_DAILY_LIVING_SUM: 26
KNEEL ON THE FRONT OF YOUR KNEE: NOT ANSWERED
WALK: ACTIVITY IS MINIMALLY DIFFICULT
SIT WITH YOUR KNEE BENT: ACTIVITY IS SOMEWHAT DIFFICULT
GIVING WAY, BUCKLING OR SHIFTING OF KNEE: THE SYMPTOM AFFECTS MY ACTIVITY MODERATELY
STAND: NOT ANSWERED

## 2020-12-10 NOTE — PROGRESS NOTES
"[facility] Initial Evaluation  Subjective:  The history is provided by the patient. No  was used.   Therapist Generated HPI Evaluation  Problem details: 11-7-20 her large dog ran into the side of her right knee.  She had immediate pain and was unable to walk without severe pain.  She went to the urgent care and was given a knee brace and had a knee xray.  She went on to have an MRI (results in Epic) with confirmed s/p patellar injury/dislocation.  At this time pain is intermittent right lateral hip, distal right quad and medial right knee ranging 0-5/10, described as \"achy\" and intermittently \"sharp\".  She was using a SEC until about 4 days ago (on the left side) and continues to have her brace on at all times except for when showering.  Symptoms increase with stairs (doing nonreciprocal), standing>5', walking >5', walking on unlevel ground and walking without a brace, squatting.  Symptoms decrease with use of brace, rest. .                     Pain is the same all the time.  Since onset symptoms are gradually improving.     Special tests included:  MRI and x-ray.    Barriers include:  Stairs.    Patient Health History           General health as reported by patient is fair.  Pertinent medical history includes: overweight, asthma and smoking.   Red flags:  None as reported by patient.  Medical allergies: none.   Surgeries include:  None.    Current medications:  Anti-depressants.    Current occupation is Does not work outside the home.                                       Objective:  Standing Alignment:              Knee deviations alignment: no swelling or warmth about knee.      Gait:  Slow jovana, does not flex right knee during gait cycle, early heel-rise on the right  Gait Type:  Antalgic   Assistive Devices:  Brace                                                        Knee Evaluation:  ROM:    AROM      Extension:  Left: 0    Right:  11 supine, 24 sitting with " ERP  Flexion: Left: WNL    Right: 94 sitting and supine heelslide with ERP           Ligament Testing:  Normal                  Palpation:  Palpation of knee: tenderness medial and lateral patella, distal quadricp on the right                 General     ROS  MMT left hip, knee and ankle WNL   MMT right hip flexion and abduction 4+/5, extension 4/5, knee ext 4/5 with pain, flexion 4/5, ankle DF 4+/5; quadricep set on the right submax   Assessment/Plan:    Patient is a 30 year old female with right side knee complaints.    Patient has the following significant findings with corresponding treatment plan.                Diagnosis 1:  Right knee pain s/p patellar subluxation 11-7-20    Pain -  hot/cold therapy, self management, education and home program  Decreased ROM/flexibility - therapeutic exercise and home program  Decreased strength - therapeutic exercise, therapeutic activities and home program  Impaired gait - assistive devices and home program  Decreased function - therapeutic activities and home program    Cumulative Therapy Evaluation is: Low complexity.    Previous and current functional limitations:  (See Goal Flow Sheet for this information)    Short term and Long term goals: (See Goal Flow Sheet for this information)     Communication ability:  Patient appears to be able to clearly communicate and understand verbal and written communication and follow directions correctly.  Treatment Explanation - The following has been discussed with the patient:   RX ordered/plan of care  Anticipated outcomes  Possible risks and side effects  This patient would benefit from PT intervention to resume normal activities.   Rehab potential is good    Frequency:  1 X week, once daily  Duration:  for 8 weeks  Discharge Plan:  Achieve all LTG.  Independent in home treatment program.  Reach maximal therapeutic benefit.    Please refer to the daily flowsheet for treatment today, total treatment time and time spent performing  1:1 timed codes.

## 2020-12-17 ENCOUNTER — VIRTUAL VISIT (OUTPATIENT)
Dept: PSYCHOLOGY | Facility: CLINIC | Age: 30
End: 2020-12-17
Payer: COMMERCIAL

## 2020-12-17 DIAGNOSIS — F33.0 MILD EPISODE OF RECURRENT MAJOR DEPRESSIVE DISORDER (H): Primary | ICD-10-CM

## 2020-12-17 PROCEDURE — 90834 PSYTX W PT 45 MINUTES: CPT | Mod: 95

## 2020-12-17 NOTE — PROGRESS NOTES
Progress Note    Patient Name: Cheryle Vazquez  Date: 12/14/2020         Service Type: Individual      Session Start Time: 12:40pm  Session End Time: 1:20pm     Session Length:  40 minutes     Session #:  10    Attendees: Client attended alone    Service Modality:  Phone    Telemedicine Visit: The patient's condition can be safely assessed and treated via synchronous audio and visual telemedicine encounter.      Reason for Telemedicine Visit: Services only offered telehealth    Originating Site (Patient Location): Patient's home    Distant Site (Provider Location): Provider Remote Setting    Consent:  The patient/guardian has verbally consented to: the potential risks and benefits of telemedicine (Phone) versus in person care; bill my insurance or make self-payment for services provided; and responsibility for payment of non-covered services.     Mode of Communication:  Phone    As the provider I attest to compliance with applicable laws and regulations related to telemedicine.     Treatment Plan Last Reviewed: 11/12/2020  PHQ-9 / AAMIR-7: 12/4/2020    DATA  Interactive Complexity: No  Crisis: No       Progress Since Last Session (Related to Symptoms / Goals / Homework):   Symptoms: No change Patient reported continued anxiety symptoms with slight decrease.    Homework: Did not complete      Episode of Care Goals: No improvement - PREPARATION (Decided to change - considering how); Intervened by negotiating a change plan and determining options / strategies for behavior change, identifying triggers, exploring social supports, and working towards setting a date to begin behavior change     Current / Ongoing Stressors and Concerns:  Ongoing: Patient reported a hx of trauma related to her ex- and ongoing anxiety symptoms.    Current: Patient provided an update about her mood and symptoms over the past two weeks. Patient and clinician began to process some of the  patient's presenting symptoms and how they are related to trauma.      Treatment Objective(s) Addressed in This Session:   Patient will identify at least three phsycial activities to implement into her daily schedule.  Patient will implement healthier eating habits into her daily schedule.  Patient will identify three fears / thoughts that contribute to feeling anxious.  Patient will identify three distraction and diversion activities and use those activities to decrease level of anxiety.        Intervention:   Motivational Interviewing    MI Intervention: Supported Autonomy, Collaboration, Evocation and Open-ended questions     Change Talk Expressed by the Patient: Committment to change    Provider Response to Change Talk: E - Evoked more info from patient about behavior change, A - Affirmed patient's thoughts, decisions, or attempts at behavior change, R - Reflected patient's change talk and S - Summarized patient's change talk statements    CBT: Discussed strategies to be able to identify trauma symptoms and techniques to cope with them.     ASSESSMENT: Current Emotional / Mental Status (status of significant symptoms):   Risk status (Self / Other harm or suicidal ideation)   Patient denies current fears or concerns for personal safety.   Patient reports the following current or recent suicidal ideation or behaviors: The patient reported SI thoughts have decreased but are still present. .   Patient denies current or recent homicidal ideation or behaviors.   Patient denies current or recent self injurious behavior or ideation.   Patient denies other safety concerns.   Patient reports there has been no change in risk factors since their last session.     Patient reports there has been no change in protective factors since their last session.     A safety and risk management plan has been developed including: Patient consented to co-developed safety plan.  Safety and risk management plan was completed.  Patient  agreed to use safety plan should any safety concerns arise.  A copy was given to the patient.     Appearance:   Unable to assess over the phone.    Eye Contact:   Unable to assess over the phone.     Psychomotor Behavior: Unable to assess over the phone.    Attitude:   Playful Friendly   Orientation:   All   Speech    Rate / Production: Talkative    Volume:  Normal    Mood:    Normal   Affect:    Bright    Thought Content:  Clear    Thought Form:  Coherent  Logical    Insight:    Fair      Medication Review:   Changes to psychiatric medications, see updated Medication List in EPIC.      Medication Compliance:   Yes Patient recently re-started medication.     Changes in Health Issues:   Yes: Patient reported that she recently injured her knee.     Chemical Use Review:   Substance Use: Chemical use reviewed, no active concerns identified      Tobacco Use: No change in amount of tobacco use since last session.  Patient declined discussion at this time    Diagnosis:  1. Mild episode of recurrent major depressive disorder (H)        Collateral Reports Completed:   Not Applicable    PLAN: (Patient Tasks / Therapist Tasks / Other)  The patient has been scheduled for a follow up appointment on 12/17. The patient agreed to speak with her mother and sister to gain insight into her behaviors related to trauma.     JOSE ALEJANDRO CLEMENTE, [facility]  12/4/2020  Service Performed and Documented by [facility]-   Note reviewed and clinical supervision by JOSE ALEJANDRO Herman [facility] 12/19/2020  ______________________________________________________________________    Treatment Plan    Patient's Name: Cheryle Vazquez  YOB: 1990    Date: 08/07/2020  Reviewed: 11/12/2020    DSM5 Diagnoses: 296.31 (F33.0) Major Depressive Disorder, Recurrent Episode, Mild With anxious distress  Psychosocial / Contextual Factors: Hx of trauma, hx of depression, psychosocial stressors including previous relationships (divorce)  WHODAS:  18    Referral / Collaboration:  Referral to another professional/service is not indicated at this time.    Anticipated number of session or this episode of care: 20      MeasurableTreatment Goal(s) related to diagnosis / functional impairment(s)  Goal 1: Patient will implement healthy lifestyle skills to aid in the improvement of her overall mood.      I will know I've met my goal when I am exercising regularly and not feeling down.     Objective #A (Patient Action)    Patient will identify at least three phsycial activities to implement into her daily schedule. .  Status: Continued - Date(s):11/12/20     Intervention(s)  Therapist will assign the patient a weekly goal in the context of completing physical activity exercises.     Objective #B  Patient will implement healthier eating habits into her daily schedule. .  Status: Continued - Date(s): 11/12/20     Intervention(s)  Therapist will assign the patient a weekly goal related to making healthier eating habits.     Objective #C  Patient will increase knowledge and understanding about the importance of healthy eating and how that affects mental health.  Status: Continued - Date(s): 11/12/20      Intervention(s)  Therapist will provide the patient with psycho-education related to healthy eating the mental health connection.      Goal 2: Patient will experience a decrease in anxiety symptoms as evidenced by a AAMIR 7 score of 5 or less.      I will know I've met my goal when I'm not as anxious.      Objective #A (Patient Action)    Status: Continued - Date(s): 11/12/20     Patient will identify three fears / thoughts that contribute to feeling anxious.    Intervention(s)  Therapist will assign homework assign homework for the patient to track anxious thoughts.    Objective #B  Patient will identify three distraction and diversion activities and use those activities to decrease level of anxiety  .    Status: Continued - Date(s): 11/12/20     Intervention(s)  Therapist  "will teach the patient varying skills and techniques to be used as distraction skills and assign homework for practice outside of sessions.    Objective #C  Patient will use thought-stopping strategy daily to reduce intrusive thoughts.  Status: Continued - Date(s): 11/12/20     Intervention(s)  Therapist will teach the patient cognitive re-framing skills to challenge negative and anxious thoughts.      Goal 3: Patiient will develop a better understanding and knowledge of trauma while implementing effective skills to address functioning impairments related to trauma.      I will know I've met my goal when I situations related to my ex- does not interfere with my day to day functioning.      Objective #A (Patient Action)    Status: Continued - Date(s): 11/12/20     Patient will identify and unpack at least two \"boxes\" related to her trauma. .    Intervention(s)  Therapist will guide the patient through porcessing traumatic events in the session..    Objective #B  Patient will implement mindfulness and grounding skills to help the patient regulate when processing trumatic experiences.    Status: Continued - Date(s): 11/12/20     Intervention(s)  Therapist will teach the patient mindfulness and grounding skills.     Patient has reviewed and agreed to the above plan.      JOSE ALEJANDRO CLEMENTE, [facility]  November 12, 2020  Treatment plan reviewed and clinical supervision by JOSE ALEJANDRO Herman [facility] 11/10/2020  "

## 2020-12-23 ENCOUNTER — THERAPY VISIT (OUTPATIENT)
Dept: PHYSICAL THERAPY | Facility: CLINIC | Age: 30
End: 2020-12-23
Payer: COMMERCIAL

## 2020-12-23 DIAGNOSIS — S83.091S: ICD-10-CM

## 2020-12-23 DIAGNOSIS — M25.561 RIGHT KNEE PAIN: ICD-10-CM

## 2020-12-23 PROCEDURE — 97110 THERAPEUTIC EXERCISES: CPT | Mod: GP | Performed by: PHYSICAL THERAPIST

## 2020-12-23 PROCEDURE — 97112 NEUROMUSCULAR REEDUCATION: CPT | Mod: GP | Performed by: PHYSICAL THERAPIST

## 2020-12-31 ENCOUNTER — VIRTUAL VISIT (OUTPATIENT)
Dept: PSYCHOLOGY | Facility: CLINIC | Age: 30
End: 2020-12-31
Payer: COMMERCIAL

## 2020-12-31 DIAGNOSIS — F33.0 MILD EPISODE OF RECURRENT MAJOR DEPRESSIVE DISORDER (H): Primary | ICD-10-CM

## 2020-12-31 PROCEDURE — 90834 PSYTX W PT 45 MINUTES: CPT | Mod: 95

## 2021-01-06 ENCOUNTER — OFFICE VISIT (OUTPATIENT)
Dept: URGENT CARE | Facility: URGENT CARE | Age: 31
End: 2021-01-06
Payer: COMMERCIAL

## 2021-01-06 ENCOUNTER — THERAPY VISIT (OUTPATIENT)
Dept: PHYSICAL THERAPY | Facility: CLINIC | Age: 31
End: 2021-01-06
Payer: COMMERCIAL

## 2021-01-06 VITALS
SYSTOLIC BLOOD PRESSURE: 142 MMHG | HEIGHT: 65 IN | BODY MASS INDEX: 48.82 KG/M2 | OXYGEN SATURATION: 97 % | TEMPERATURE: 98.1 F | WEIGHT: 293 LBS | RESPIRATION RATE: 18 BRPM | HEART RATE: 69 BPM | DIASTOLIC BLOOD PRESSURE: 86 MMHG

## 2021-01-06 DIAGNOSIS — A49.9 BACTERIAL INFECTION: ICD-10-CM

## 2021-01-06 DIAGNOSIS — N76.0 BACTERIAL VAGINITIS: Primary | ICD-10-CM

## 2021-01-06 DIAGNOSIS — N76.0 ACUTE VAGINITIS: ICD-10-CM

## 2021-01-06 DIAGNOSIS — M25.561 RIGHT KNEE PAIN: ICD-10-CM

## 2021-01-06 DIAGNOSIS — B96.89 BACTERIAL VAGINITIS: Primary | ICD-10-CM

## 2021-01-06 DIAGNOSIS — Z11.3 SCREEN FOR STD (SEXUALLY TRANSMITTED DISEASE): ICD-10-CM

## 2021-01-06 DIAGNOSIS — S83.091S: ICD-10-CM

## 2021-01-06 LAB
ALBUMIN UR-MCNC: NEGATIVE MG/DL
APPEARANCE UR: CLEAR
BILIRUB UR QL STRIP: NEGATIVE
COLOR UR AUTO: YELLOW
GLUCOSE UR STRIP-MCNC: NEGATIVE MG/DL
HBV SURFACE AB SERPL IA-ACNC: 3.63 M[IU]/ML
HBV SURFACE AG SERPL QL IA: NONREACTIVE
HCV AB SERPL QL IA: NONREACTIVE
HGB UR QL STRIP: NEGATIVE
HIV 1+2 AB+HIV1 P24 AG SERPL QL IA: NONREACTIVE
KETONES UR STRIP-MCNC: NEGATIVE MG/DL
LEUKOCYTE ESTERASE UR QL STRIP: NEGATIVE
NITRATE UR QL: NEGATIVE
PH UR STRIP: 5.5 PH (ref 5–7)
SOURCE: NORMAL
SP GR UR STRIP: 1.02 (ref 1–1.03)
SPECIMEN SOURCE: ABNORMAL
T PALLIDUM AB SER QL: NONREACTIVE
UROBILINOGEN UR STRIP-ACNC: 0.2 EU/DL (ref 0.2–1)
WET PREP SPEC: ABNORMAL

## 2021-01-06 PROCEDURE — 86803 HEPATITIS C AB TEST: CPT | Performed by: PHYSICIAN ASSISTANT

## 2021-01-06 PROCEDURE — 87491 CHLMYD TRACH DNA AMP PROBE: CPT | Performed by: PHYSICIAN ASSISTANT

## 2021-01-06 PROCEDURE — 81003 URINALYSIS AUTO W/O SCOPE: CPT | Performed by: FAMILY MEDICINE

## 2021-01-06 PROCEDURE — 99000 SPECIMEN HANDLING OFFICE-LAB: CPT | Performed by: PHYSICIAN ASSISTANT

## 2021-01-06 PROCEDURE — 97530 THERAPEUTIC ACTIVITIES: CPT | Mod: GP | Performed by: PHYSICAL THERAPIST

## 2021-01-06 PROCEDURE — 87210 SMEAR WET MOUNT SALINE/INK: CPT | Performed by: FAMILY MEDICINE

## 2021-01-06 PROCEDURE — 97110 THERAPEUTIC EXERCISES: CPT | Mod: GP | Performed by: PHYSICAL THERAPIST

## 2021-01-06 PROCEDURE — 36415 COLL VENOUS BLD VENIPUNCTURE: CPT | Performed by: PHYSICIAN ASSISTANT

## 2021-01-06 PROCEDURE — 86780 TREPONEMA PALLIDUM: CPT | Mod: 90 | Performed by: PHYSICIAN ASSISTANT

## 2021-01-06 PROCEDURE — 97112 NEUROMUSCULAR REEDUCATION: CPT | Mod: GP | Performed by: PHYSICAL THERAPIST

## 2021-01-06 PROCEDURE — 87591 N.GONORRHOEAE DNA AMP PROB: CPT | Performed by: PHYSICIAN ASSISTANT

## 2021-01-06 PROCEDURE — 86706 HEP B SURFACE ANTIBODY: CPT | Performed by: PHYSICIAN ASSISTANT

## 2021-01-06 PROCEDURE — 87389 HIV-1 AG W/HIV-1&-2 AB AG IA: CPT | Performed by: PHYSICIAN ASSISTANT

## 2021-01-06 PROCEDURE — 99213 OFFICE O/P EST LOW 20 MIN: CPT | Performed by: PHYSICIAN ASSISTANT

## 2021-01-06 PROCEDURE — 87340 HEPATITIS B SURFACE AG IA: CPT | Performed by: PHYSICIAN ASSISTANT

## 2021-01-06 RX ORDER — METRONIDAZOLE 500 MG/1
500 TABLET ORAL 2 TIMES DAILY
Qty: 14 TABLET | Refills: 0 | Status: SHIPPED | OUTPATIENT
Start: 2021-01-06 | End: 2021-01-13

## 2021-01-06 ASSESSMENT — MIFFLIN-ST. JEOR: SCORE: 2164.45

## 2021-01-06 NOTE — PROGRESS NOTES
"Patient presents with:  Urgent Care  Consult: BV, yeast infection     (N76.0,  B96.89) Bacterial vaginitis  (primary encounter diagnosis)  Comment:   Plan: Wet prep, metroNIDAZOLE (FLAGYL) 500 MG tablet            (A49.9) Bacterial infection  Comment:   Plan: UA reflex to Microscopic and Culture, Wet prep            (Z11.3) Screen for STD (sexually transmitted disease)  Comment:   Plan: UA reflex to Microscopic and Culture, NEISSERIA        GONORRHOEA PCR, CHLAMYDIA TRACHOMATIS PCR                SUBJECTIVE:  Cheryle Vazquez is a 30 year old female who presents with vaginal discharge, denies itching.  \"Something just doesn't seem right\".    Denies significant abdominal pain.    Onset of symptoms 3 day(s) ago, gradually worsening since.     Pain:none.     Vaginal bleeding: No      Vaginal symptoms: discharge described as more than usual, irritating  No LMP recorded. (Menstrual status: IUD).    Sexually active: not since last spring.  Requests STD screening.     Predisposing factors: None    Past Medical History:   Diagnosis Date     Acid reflux      ADHD (attention deficit hyperactivity disorder)      Depressive disorder      Eczema      Current Outpatient Medications   Medication Sig Dispense Refill     albuterol (PROAIR HFA/PROVENTIL HFA/VENTOLIN HFA) 108 (90 Base) MCG/ACT inhaler Inhale 1-2 puffs into the lungs every 6 hours 8 g 0     albuterol (PROVENTIL) (2.5 MG/3ML) 0.083% neb solution Take 1 vial (2.5 mg) by nebulization every 6 hours as needed 25 vial 0     ARIPiprazole (ABILIFY) 10 MG tablet Take 1 tablet (10 mg) by mouth daily 30 tablet 1     citalopram (CELEXA) 20 MG tablet Take 1 tablet (20 mg) by mouth daily 90 tablet 3     fluticasone (FLONASE) 50 MCG/ACT nasal spray Spray 1 spray into both nostrils daily 16 g 3     lisdexamfetamine (VYVANSE) 50 MG capsule Take 1 capsule (50 mg) by mouth every morning 30 capsule 0     Melatonin 10 MG TABS tablet Take 10 mg by mouth nightly as needed for sleep   "     metroNIDAZOLE (FLAGYL) 500 MG tablet Take 1 tablet (500 mg) by mouth 2 times daily for 7 days 14 tablet 0     order for DME Equipment being ordered: Nebulizer 1 Device 0     FLUoxetine (PROZAC) 40 MG capsule Take 1 capsule (40 mg) by mouth daily 30 capsule 0     Social History     Socioeconomic History     Marital status:      Spouse name: Not on file     Number of children: Not on file     Years of education: Not on file     Highest education level: Not on file   Occupational History     Not on file   Social Needs     Financial resource strain: Not on file     Food insecurity     Worry: Not on file     Inability: Not on file     Transportation needs     Medical: Not on file     Non-medical: Not on file   Tobacco Use     Smoking status: Current Some Day Smoker     Packs/day: 0.25     Years: 0.50     Pack years: 0.12     Types: Cigarettes     Smokeless tobacco: Former User     Tobacco comment: Trying to stop---Hasn't smoked for a week.   Substance and Sexual Activity     Alcohol use: Yes     Comment: minimal     Drug use: Yes     Frequency: 3.0 times per week     Types: Marijuana     Sexual activity: Not Currently     Partners: Male     Birth control/protection: Pill   Lifestyle     Physical activity     Days per week: Not on file     Minutes per session: Not on file     Stress: Not on file   Relationships     Social connections     Talks on phone: Not on file     Gets together: Not on file     Attends [employer] service: Not on file     Active member of club or organization: Not on file     Attends meetings of clubs or organizations: Not on file     Relationship status: Not on file     Intimate partner violence     Fear of current or ex partner: Not on file     Emotionally abused: Not on file     Physically abused: Not on file     Forced sexual activity: Not on file   Other Topics Concern     Parent/sibling w/ CABG, MI or angioplasty before 65F 55M? Not Asked   Social History Narrative     Not on file  "      ROS:   CONSTITUTIONAL:NEGATIVE for fever, chills, change in weight  INTEGUMENTARY/SKIN: NEGATIVE for worrisome rashes, moles or lesions  GI: NEGATIVE for nausea, abdominal pain, heartburn, or change in bowel habits  : as per HPI  MUSCULOSKELETAL: NEGATIVE for significant arthralgias or myalgia  HEME/ALLERGY/IMMUNE: NEGATIVE for bleeding problems    OBJECTIVE:  BP (!) 142/86 (BP Location: Left arm, Patient Position: Sitting, Cuff Size: Adult Regular)   Pulse 69   Temp 98.1  F (36.7  C) (Tympanic)   Resp 18   Ht 1.638 m (5' 4.5\")   Wt 145.2 kg (320 lb)   SpO2 97%   BMI 54.08 kg/m    : deferred.  Self wet prep and GC/Chl swabs also obtained by patient.  GENERAL APPEARANCE: healthy, alert and no distress  ABDOMEN:  soft, nontender, no HSM or masses and bowel sounds normal  BACK: No CVA tenderness  SKIN: no suspicious lesions or rashes      "

## 2021-01-06 NOTE — PATIENT INSTRUCTIONS
(N76.0,  B96.89) Bacterial vaginitis  (primary encounter diagnosis)  Comment:   Plan: Wet prep, metroNIDAZOLE (FLAGYL) 500 MG tablet            (A49.9) Bacterial infection  Comment:   Plan: UA reflex to Microscopic and Culture, Wet prep            (Z11.3) Screen for STD (sexually transmitted disease)  Comment:   Plan: UA reflex to Microscopic and Culture, NEISSERIA        GONORRHOEA PCR, CHLAMYDIA TRACHOMATIS PCR            (N76.0) Acute vaginitis  Comment:   Plan: NEISSERIA GONORRHOEA PCR, CHLAMYDIA TRACHOMATIS        PCR              Patient Education     Bacterial Vaginosis    You have a vaginal infection called bacterial vaginosis (BV). Both good and bad bacteria are present in a healthy vagina. BV occurs when these bacteria get out of balance. The number of bad bacteria increase. And the number of good bacteria decrease. Although BV is associated with sexual activity, it is not a sexually transmitted disease.  BV may or may not cause symptoms. If symptoms do occur, they can include:    Thin, gray, milky-white, or sometimes green discharge    Unpleasant odor or  fishy  smell    Itching, burning, or pain in or around the vagina  It is not known what causes BV, but certain factors can make the problem more likely. This can include:    Douching    Having sex with a new partner    Having sex with more than one partner  BV will sometimes go away on its own. But treatment is usually recommended. This is because untreated BV can increase the risk of more serious health problems such as:    Pelvic inflammatory disease (PID)     delivery (giving birth to a baby early if you re pregnant)    HIV and certain other sexually transmitted diseases (STDs)    Infection after surgery on the reproductive organs  Home care  General care    BV is most often treated with medicines called antibiotics. These may be given as pills or as a vaginal cream. If antibiotics are prescribed, be sure to use them exactly as directed. Also,  be sure to complete all of the medicine, even if your symptoms go away.    Don't douche or having sex during treatment.    If you have sex with a female partner, ask your healthcare provider if she should also be treated.  Prevention    Don't douche.    Don't have sex. If you do have sex, then take steps to lower your risk:  ? Use condoms when having sex.  ? Limit the number of sexual partners you have.  Follow-up care  Follow up with your healthcare provider, or as advised.  When to seek medical advice  Call your healthcare provider right away if:    You have a fever of 100.4 F (38 C) or higher, or as directed by your provider.    Your symptoms worsen, or they don t go away within a few days of starting treatment.    You have new pain in the lower belly or pelvic region.    You have side effects that bother you or a reaction to the pills or cream you re prescribed.    You or any partners you have sex with have new symptoms, such as a rash, joint pain, or sores.  [employer] last reviewed this educational content on 10/1/2017    [id number] [employer]. [address] [place] [id number]. All rights reserved. This information is not intended as a substitute for professional medical care. Always follow your healthcare professional's instructions.

## 2021-01-10 ENCOUNTER — MYC MEDICAL ADVICE (OUTPATIENT)
Dept: FAMILY MEDICINE | Facility: CLINIC | Age: 31
End: 2021-01-10

## 2021-01-10 DIAGNOSIS — Z23 NEED FOR HEPATITIS B VACCINATION: Primary | ICD-10-CM

## 2021-01-11 NOTE — TELEPHONE ENCOUNTER
Patient called the clinic and was given the [employer] message. She states that she has already scheduled her first hepatitis B vaccine. Coral Farris RN

## 2021-01-12 ENCOUNTER — ALLIED HEALTH/NURSE VISIT (OUTPATIENT)
Dept: NURSING | Facility: CLINIC | Age: 31
End: 2021-01-12
Payer: COMMERCIAL

## 2021-01-12 DIAGNOSIS — Z23 NEED FOR VACCINATION: Primary | ICD-10-CM

## 2021-01-12 PROCEDURE — 99207 PR NO CHARGE NURSE ONLY: CPT

## 2021-01-12 PROCEDURE — 90746 HEPB VACCINE 3 DOSE ADULT IM: CPT

## 2021-01-12 PROCEDURE — 90471 IMMUNIZATION ADMIN: CPT

## 2021-01-13 ENCOUNTER — THERAPY VISIT (OUTPATIENT)
Dept: PHYSICAL THERAPY | Facility: CLINIC | Age: 31
End: 2021-01-13
Payer: COMMERCIAL

## 2021-01-13 DIAGNOSIS — M25.561 RIGHT KNEE PAIN: ICD-10-CM

## 2021-01-13 DIAGNOSIS — S83.091S: ICD-10-CM

## 2021-01-13 PROCEDURE — 97112 NEUROMUSCULAR REEDUCATION: CPT | Mod: GP | Performed by: PHYSICAL THERAPIST

## 2021-01-13 PROCEDURE — 97110 THERAPEUTIC EXERCISES: CPT | Mod: GP | Performed by: PHYSICAL THERAPIST

## 2021-01-15 ENCOUNTER — VIRTUAL VISIT (OUTPATIENT)
Dept: PSYCHOLOGY | Facility: CLINIC | Age: 31
End: 2021-01-15
Payer: COMMERCIAL

## 2021-01-15 DIAGNOSIS — F33.0 MILD EPISODE OF RECURRENT MAJOR DEPRESSIVE DISORDER (H): Primary | ICD-10-CM

## 2021-01-15 PROCEDURE — 90834 PSYTX W PT 45 MINUTES: CPT | Mod: 95

## 2021-01-18 NOTE — PROGRESS NOTES
Progress Note    Patient Name: Cheryle Vazquez  Date: 12/31/2020         Service Type: Individual      Session Start Time: 3:07pm  Session End Time: 3:50pm     Session Length: 43 minutes     Session #:  12    Attendees: Client attended alone    Service Modality:  Phone    Telemedicine Visit: The patient's condition can be safely assessed and treated via synchronous audio and visual telemedicine encounter.      Reason for Telemedicine Visit: Services only offered telehealth    Originating Site (Patient Location): Patient's home    Distant Site (Provider Location): Provider Remote Setting    Consent:  The patient/guardian has verbally consented to: the potential risks and benefits of telemedicine (Phone) versus in person care; bill my insurance or make self-payment for services provided; and responsibility for payment of non-covered services.     Mode of Communication:  Phone    As the provider I attest to compliance with applicable laws and regulations related to telemedicine.     Treatment Plan Last Reviewed: 11/12/2020  PHQ-9 / AAMIR-7: 12/4/2020    DATA  Interactive Complexity: No  Crisis: No       Progress Since Last Session (Related to Symptoms / Goals / Homework):   Symptoms: No change Patient reported continued anxiety and depression symptoms.    Homework: Achieved / completed to satisfaction      Episode of Care Goals: No improvement - PREPARATION (Decided to change - considering how); Intervened by negotiating a change plan and determining options / strategies for behavior change, identifying triggers, exploring social supports, and working towards setting a date to begin behavior change     Current / Ongoing Stressors and Concerns:  Ongoing: Patient reported a hx of trauma related to her ex- and ongoing anxiety symptoms.    Current: Patient provided an update about her mood and symptoms over the past two weeks. Patient and clinician processed and  discussed the patient's completed homework of identifying barriers for goal completion. Clinician and patient discussed some options for challenging barriers, including accepting help from others.      Treatment Objective(s) Addressed in This Session:   Patient will identify at least three phsycial activities to implement into her daily schedule.  Patient will implement healthier eating habits into her daily schedule.  Patient will identify three fears / thoughts that contribute to feeling anxious.  Patient will identify three distraction and diversion activities and use those activities to decrease level of anxiety.        Intervention:   Motivational Interviewing    MI Intervention: Supported Autonomy, Collaboration, Evocation and Open-ended questions     Change Talk Expressed by the Patient: Committment to change    Provider Response to Change Talk: E - Evoked more info from patient about behavior change, A - Affirmed patient's thoughts, decisions, or attempts at behavior change, R - Reflected patient's change talk and S - Summarized patient's change talk statements    CBT: Discussed strategies to be able to identify trauma symptoms and techniques to cope with them.     ASSESSMENT: Current Emotional / Mental Status (status of significant symptoms):   Risk status (Self / Other harm or suicidal ideation)   Patient denies current fears or concerns for personal safety.   Patient reports the following current or recent suicidal ideation or behaviors: The patient reported SI thoughts have decreased but are still present. .   Patient denies current or recent homicidal ideation or behaviors.   Patient denies current or recent self injurious behavior or ideation.   Patient denies other safety concerns.   Patient reports there has been no change in risk factors since their last session.     Patient reports there has been no change in protective factors since their last session.     A safety and risk management plan has  been developed including: Patient consented to co-developed safety plan.  Safety and risk management plan was completed.  Patient agreed to use safety plan should any safety concerns arise.  A copy was given to the patient.     Appearance:   Unable to assess over the phone.    Eye Contact:   Unable to assess over the phone.     Psychomotor Behavior: Unable to assess over the phone.    Attitude:   Playful Friendly   Orientation:   All   Speech    Rate / Production: Talkative    Volume:  Normal    Mood:    Normal   Affect:    Bright    Thought Content:  Clear    Thought Form:  Coherent  Logical    Insight:    Fair      Medication Review:   Changes to psychiatric medications, see updated Medication List in EPIC.      Medication Compliance:   Yes Patient recently re-started medication.     Changes in Health Issues:   Yes: Patient reported that she recently injured her knee.     Chemical Use Review:   Substance Use: Chemical use reviewed, no active concerns identified      Tobacco Use: No change in amount of tobacco use since last session.  Patient declined discussion at this time    Diagnosis:  1. Mild episode of recurrent major depressive disorder (H)        Collateral Reports Completed:   Not Applicable    PLAN: (Patient Tasks / Therapist Tasks / Other)  The patient has been scheduled for a follow up appointment on 1/7. The patient agreed to allow her mother and sister to help her clean a part of her room.     JOSE ALEJANDRO CLEMENTE, [facility]  12/31/2020  Service Performed and Documented by [facility]-   Note reviewed and clinical supervision by JOSE ALEJANDRO [facility] 1/19/2021  ______________________________________________________________________    Treatment Plan    Patient's Name: Cheryle Vazquez  YOB: 1990    Date: 08/07/2020  Reviewed: 11/12/2020    DSM5 Diagnoses: 296.31 (F33.0) Major Depressive Disorder, Recurrent Episode, Mild With anxious distress  Psychosocial / Contextual Factors: Hx of  trauma, hx of depression, psychosocial stressors including previous relationships (divorce)  WHODAS: 18    Referral / Collaboration:  Referral to another professional/service is not indicated at this time.    Anticipated number of session or this episode of care: 20      MeasurableTreatment Goal(s) related to diagnosis / functional impairment(s)  Goal 1: Patient will implement healthy lifestyle skills to aid in the improvement of her overall mood.      I will know I've met my goal when I am exercising regularly and not feeling down.     Objective #A (Patient Action)    Patient will identify at least three phsycial activities to implement into her daily schedule. .  Status: Continued - Date(s):11/12/20     Intervention(s)  Therapist will assign the patient a weekly goal in the context of completing physical activity exercises.     Objective #B  Patient will implement healthier eating habits into her daily schedule. .  Status: Continued - Date(s): 11/12/20     Intervention(s)  Therapist will assign the patient a weekly goal related to making healthier eating habits.     Objective #C  Patient will increase knowledge and understanding about the importance of healthy eating and how that affects mental health.  Status: Continued - Date(s): 11/12/20      Intervention(s)  Therapist will provide the patient with psycho-education related to healthy eating the mental health connection.      Goal 2: Patient will experience a decrease in anxiety symptoms as evidenced by a AAMIR 7 score of 5 or less.      I will know I've met my goal when I'm not as anxious.      Objective #A (Patient Action)    Status: Continued - Date(s): 11/12/20     Patient will identify three fears / thoughts that contribute to feeling anxious.    Intervention(s)  Therapist will assign homework assign homework for the patient to track anxious thoughts.    Objective #B  Patient will identify three distraction and diversion activities and use those activities to  "decrease level of anxiety  .    Status: Continued - Date(s): 11/12/20     Intervention(s)  Therapist will teach the patient varying skills and techniques to be used as distraction skills and assign homework for practice outside of sessions.    Objective #C  Patient will use thought-stopping strategy daily to reduce intrusive thoughts.  Status: Continued - Date(s): 11/12/20     Intervention(s)  Therapist will teach the patient cognitive re-framing skills to challenge negative and anxious thoughts.      Goal 3: Patiient will develop a better understanding and knowledge of trauma while implementing effective skills to address functioning impairments related to trauma.      I will know I've met my goal when I situations related to my ex- does not interfere with my day to day functioning.      Objective #A (Patient Action)    Status: Continued - Date(s): 11/12/20     Patient will identify and unpack at least two \"boxes\" related to her trauma. .    Intervention(s)  Therapist will guide the patient through porcessing traumatic events in the session..    Objective #B  Patient will implement mindfulness and grounding skills to help the patient regulate when processing trumatic experiences.    Status: Continued - Date(s): 11/12/20     Intervention(s)  Therapist will teach the patient mindfulness and grounding skills.     Patient has reviewed and agreed to the above plan.      JOSE ALEJANDRO CLEMENTE, [facility]  November 12, 2020  Treatment plan reviewed and clinical supervision by JOSE ALEJANDRO [facility] 11/10/2020  "

## 2021-01-21 ENCOUNTER — THERAPY VISIT (OUTPATIENT)
Dept: PHYSICAL THERAPY | Facility: CLINIC | Age: 31
End: 2021-01-21
Payer: COMMERCIAL

## 2021-01-21 DIAGNOSIS — M25.561 RIGHT KNEE PAIN: ICD-10-CM

## 2021-01-21 DIAGNOSIS — S83.091S: ICD-10-CM

## 2021-01-21 PROCEDURE — 97112 NEUROMUSCULAR REEDUCATION: CPT | Mod: GP | Performed by: PHYSICAL THERAPIST

## 2021-01-21 PROCEDURE — 97110 THERAPEUTIC EXERCISES: CPT | Mod: GP | Performed by: PHYSICAL THERAPIST

## 2021-01-28 ENCOUNTER — THERAPY VISIT (OUTPATIENT)
Dept: PHYSICAL THERAPY | Facility: CLINIC | Age: 31
End: 2021-01-28
Payer: COMMERCIAL

## 2021-01-28 DIAGNOSIS — S83.091S: ICD-10-CM

## 2021-01-28 DIAGNOSIS — M25.561 RIGHT KNEE PAIN: ICD-10-CM

## 2021-01-28 PROCEDURE — 97530 THERAPEUTIC ACTIVITIES: CPT | Mod: GP | Performed by: PHYSICAL THERAPIST

## 2021-01-28 PROCEDURE — 97112 NEUROMUSCULAR REEDUCATION: CPT | Mod: GP | Performed by: PHYSICAL THERAPIST

## 2021-01-28 PROCEDURE — 97110 THERAPEUTIC EXERCISES: CPT | Mod: GP | Performed by: PHYSICAL THERAPIST

## 2021-01-31 NOTE — PROGRESS NOTES
Progress Note    Patient Name: Cheryle Vazquez  Date: 1/15/2021         Service Type: Individual      Session Start Time: 1:30pm  Session End Time: 2:15pm     Session Length: 43 minutes     Session #:  13    Attendees: Client attended alone    Service Modality:  Phone    Telemedicine Visit: The patient's condition can be safely assessed and treated via synchronous audio and visual telemedicine encounter.      Reason for Telemedicine Visit: Services only offered telehealth    Originating Site (Patient Location): Patient's home    Distant Site (Provider Location): Provider Remote Setting    Consent:  The patient/guardian has verbally consented to: the potential risks and benefits of telemedicine (Phone) versus in person care; bill my insurance or make self-payment for services provided; and responsibility for payment of non-covered services.     Mode of Communication:  Phone    As the provider I attest to compliance with applicable laws and regulations related to telemedicine.     Treatment Plan Last Reviewed: 11/12/2020  PHQ-9 / AAMIR-7: 12/4/2020    DATA  Interactive Complexity: No  Crisis: No       Progress Since Last Session (Related to Symptoms / Goals / Homework):   Symptoms: Improving Patient reported a slight improvment in mood from depression symptoms    Homework: Did not complete      Episode of Care Goals: No improvement - PREPARATION (Decided to change - considering how); Intervened by negotiating a change plan and determining options / strategies for behavior change, identifying triggers, exploring social supports, and working towards setting a date to begin behavior change     Current / Ongoing Stressors and Concerns:  Ongoing: Patient reported a hx of trauma related to her ex- and ongoing anxiety symptoms.    Current: Patient provided an update about her mood and symptoms over the past two weeks. Patient and clinician continued to process and  discuss barriers for goal completion. Clinician and patient also explored the patient's recent improvement in mood and strategies for maintenance.     Treatment Objective(s) Addressed in This Session:   Patient will identify at least three phsycial activities to implement into her daily schedule.  Patient will implement healthier eating habits into her daily schedule.  Patient will identify three fears / thoughts that contribute to feeling anxious.  Patient will identify three distraction and diversion activities and use those activities to decrease level of anxiety.        Intervention:   Motivational Interviewing    MI Intervention: Supported Autonomy, Collaboration, Evocation and Open-ended questions     Change Talk Expressed by the Patient: Committment to change    Provider Response to Change Talk: E - Evoked more info from patient about behavior change, A - Affirmed patient's thoughts, decisions, or attempts at behavior change, R - Reflected patient's change talk and S - Summarized patient's change talk statements    CBT: Discussed strategies to be able to identify trauma symptoms and techniques to cope with them.     ASSESSMENT: Current Emotional / Mental Status (status of significant symptoms):   Risk status (Self / Other harm or suicidal ideation)   Patient denies current fears or concerns for personal safety.   Patient reports the following current or recent suicidal ideation or behaviors: The patient reported SI thoughts have decreased but are still present. .   Patient denies current or recent homicidal ideation or behaviors.   Patient denies current or recent self injurious behavior or ideation.   Patient denies other safety concerns.   Patient reports there has been no change in risk factors since their last session.     Patient reports there has been no change in protective factors since their last session.     A safety and risk management plan has been developed including: Patient consented to  co-developed safety plan.  Safety and risk management plan was completed.  Patient agreed to use safety plan should any safety concerns arise.  A copy was given to the patient.     Appearance:   Unable to assess over the phone.    Eye Contact:   Unable to assess over the phone.     Psychomotor Behavior: Unable to assess over the phone.    Attitude:   Playful Friendly   Orientation:   All   Speech    Rate / Production: Talkative    Volume:  Normal    Mood:    Normal   Affect:    Bright    Thought Content:  Clear    Thought Form:  Coherent  Logical    Insight:    Fair      Medication Review:   Changes to psychiatric medications, see updated Medication List in EPIC.      Medication Compliance:   Yes Patient recently re-started medication.     Changes in Health Issues:   Yes: Patient reported that she recently injured her knee.     Chemical Use Review:   Substance Use: Chemical use reviewed, no active concerns identified      Tobacco Use: No change in amount of tobacco use since last session.  Patient declined discussion at this time    Diagnosis:  1. Mild episode of recurrent major depressive disorder (H)        Collateral Reports Completed:   Not Applicable    PLAN: (Patient Tasks / Therapist Tasks / Other)  The patient agreed to the re-assignment of allowing her mother and sister to help her clean a part of her room.     JOSE ALEJANDRO CLEMENTE, [facility]  1/15/2021  Service Performed and Documented by [facility]-   Note reviewed and clinical supervision by JOSE ALEJANDRO [facility] 1/31/2021  ______________________________________________________________________    Treatment Plan    Patient's Name: Cheryle Vazquez  YOB: 1990    Date: 08/07/2020  Reviewed: 11/12/2020    DSM5 Diagnoses: 296.31 (F33.0) Major Depressive Disorder, Recurrent Episode, Mild With anxious distress  Psychosocial / Contextual Factors: Hx of trauma, hx of depression, psychosocial stressors including previous relationships  (divorce)  WHODAS: 18    Referral / Collaboration:  Referral to another professional/service is not indicated at this time.    Anticipated number of session or this episode of care: 20      MeasurableTreatment Goal(s) related to diagnosis / functional impairment(s)  Goal 1: Patient will implement healthy lifestyle skills to aid in the improvement of her overall mood.      I will know I've met my goal when I am exercising regularly and not feeling down.     Objective #A (Patient Action)    Patient will identify at least three phsycial activities to implement into her daily schedule. .  Status: Continued - Date(s):11/12/20     Intervention(s)  Therapist will assign the patient a weekly goal in the context of completing physical activity exercises.     Objective #B  Patient will implement healthier eating habits into her daily schedule. .  Status: Continued - Date(s): 11/12/20     Intervention(s)  Therapist will assign the patient a weekly goal related to making healthier eating habits.     Objective #C  Patient will increase knowledge and understanding about the importance of healthy eating and how that affects mental health.  Status: Continued - Date(s): 11/12/20      Intervention(s)  Therapist will provide the patient with psycho-education related to healthy eating the mental health connection.      Goal 2: Patient will experience a decrease in anxiety symptoms as evidenced by a AAMIR 7 score of 5 or less.      I will know I've met my goal when I'm not as anxious.      Objective #A (Patient Action)    Status: Continued - Date(s): 11/12/20     Patient will identify three fears / thoughts that contribute to feeling anxious.    Intervention(s)  Therapist will assign homework assign homework for the patient to track anxious thoughts.    Objective #B  Patient will identify three distraction and diversion activities and use those activities to decrease level of anxiety  .    Status: Continued - Date(s): 11/12/20  "    Intervention(s)  Therapist will teach the patient varying skills and techniques to be used as distraction skills and assign homework for practice outside of sessions.    Objective #C  Patient will use thought-stopping strategy daily to reduce intrusive thoughts.  Status: Continued - Date(s): 11/12/20     Intervention(s)  Therapist will teach the patient cognitive re-framing skills to challenge negative and anxious thoughts.      Goal 3: Patiient will develop a better understanding and knowledge of trauma while implementing effective skills to address functioning impairments related to trauma.      I will know I've met my goal when I situations related to my ex- does not interfere with my day to day functioning.      Objective #A (Patient Action)    Status: Continued - Date(s): 11/12/20     Patient will identify and unpack at least two \"boxes\" related to her trauma. .    Intervention(s)  Therapist will guide the patient through porcessing traumatic events in the session..    Objective #B  Patient will implement mindfulness and grounding skills to help the patient regulate when processing trumatic experiences.    Status: Continued - Date(s): 11/12/20     Intervention(s)  Therapist will teach the patient mindfulness and grounding skills.     Patient has reviewed and agreed to the above plan.      JOSE ALEJANDRO CLEMENTE, [facility]  November 12, 2020  Treatment plan reviewed and clinical supervision by JOSE ALEJANDRO [facility] 11/10/2020  "

## 2021-02-26 DIAGNOSIS — F33.1 MODERATE EPISODE OF RECURRENT MAJOR DEPRESSIVE DISORDER (H): ICD-10-CM

## 2021-02-26 DIAGNOSIS — F41.1 ANXIETY STATE: ICD-10-CM

## 2021-02-26 RX ORDER — FLUOXETINE 40 MG/1
CAPSULE ORAL
Qty: 30 CAPSULE | Refills: 0 | Status: SHIPPED | OUTPATIENT
Start: 2021-02-26 | End: 2021-03-02

## 2021-02-26 NOTE — TELEPHONE ENCOUNTER
Routing refill request to provider for review/approval because:  Labs out of range: PHQ9    Kiarra Thompson, [facility]

## 2021-02-26 NOTE — TELEPHONE ENCOUNTER
30 day fill ok  I would like to check in with the patient due to worsening PHQ-9    Please have her schedule a visit.     Gerardo Wong PA-C

## 2021-03-02 ENCOUNTER — VIRTUAL VISIT (OUTPATIENT)
Dept: FAMILY MEDICINE | Facility: CLINIC | Age: 31
End: 2021-03-02
Payer: COMMERCIAL

## 2021-03-02 DIAGNOSIS — J20.9 ACUTE BRONCHITIS, UNSPECIFIED ORGANISM: ICD-10-CM

## 2021-03-02 DIAGNOSIS — F43.10 PTSD (POST-TRAUMATIC STRESS DISORDER): Primary | ICD-10-CM

## 2021-03-02 DIAGNOSIS — F33.1 MODERATE EPISODE OF RECURRENT MAJOR DEPRESSIVE DISORDER (H): ICD-10-CM

## 2021-03-02 DIAGNOSIS — F33.41 MAJOR DEPRESSIVE DISORDER, RECURRENT EPISODE, IN PARTIAL REMISSION (H): ICD-10-CM

## 2021-03-02 DIAGNOSIS — F41.1 ANXIETY STATE: ICD-10-CM

## 2021-03-02 DIAGNOSIS — F90.9 ATTENTION DEFICIT HYPERACTIVITY DISORDER (ADHD), UNSPECIFIED ADHD TYPE: ICD-10-CM

## 2021-03-02 PROCEDURE — 99214 OFFICE O/P EST MOD 30 MIN: CPT | Mod: 95 | Performed by: PHYSICIAN ASSISTANT

## 2021-03-02 RX ORDER — LISDEXAMFETAMINE DIMESYLATE 50 MG/1
50 CAPSULE ORAL DAILY
Qty: 30 CAPSULE | Refills: 0 | Status: SHIPPED | OUTPATIENT
Start: 2021-05-03 | End: 2021-06-02

## 2021-03-02 RX ORDER — ALBUTEROL SULFATE 90 UG/1
1-2 AEROSOL, METERED RESPIRATORY (INHALATION) EVERY 6 HOURS
Qty: 8 G | Refills: 0 | Status: SHIPPED | OUTPATIENT
Start: 2021-03-02 | End: 2021-04-08

## 2021-03-02 RX ORDER — LISDEXAMFETAMINE DIMESYLATE 50 MG/1
50 CAPSULE ORAL DAILY
Qty: 30 CAPSULE | Refills: 0 | Status: SHIPPED | OUTPATIENT
Start: 2021-03-02 | End: 2021-04-01

## 2021-03-02 RX ORDER — LISDEXAMFETAMINE DIMESYLATE 50 MG/1
50 CAPSULE ORAL DAILY
Qty: 30 CAPSULE | Refills: 0 | Status: SHIPPED | OUTPATIENT
Start: 2021-04-02 | End: 2021-05-02

## 2021-03-02 RX ORDER — FLUOXETINE 40 MG/1
CAPSULE ORAL
Qty: 90 CAPSULE | Refills: 0 | Status: SHIPPED | OUTPATIENT
Start: 2021-03-02 | End: 2021-09-30

## 2021-03-02 ASSESSMENT — ANXIETY QUESTIONNAIRES
3. WORRYING TOO MUCH ABOUT DIFFERENT THINGS: NOT AT ALL
2. NOT BEING ABLE TO STOP OR CONTROL WORRYING: NOT AT ALL
1. FEELING NERVOUS, ANXIOUS, OR ON EDGE: SEVERAL DAYS
6. BECOMING EASILY ANNOYED OR IRRITABLE: SEVERAL DAYS
5. BEING SO RESTLESS THAT IT IS HARD TO SIT STILL: NOT AT ALL
GAD7 TOTAL SCORE: 4
7. FEELING AFRAID AS IF SOMETHING AWFUL MIGHT HAPPEN: SEVERAL DAYS

## 2021-03-02 ASSESSMENT — ENCOUNTER SYMPTOMS
CARDIOVASCULAR NEGATIVE: 1
MUSCULOSKELETAL NEGATIVE: 1
RESPIRATORY NEGATIVE: 1
CONSTITUTIONAL NEGATIVE: 1
NEUROLOGICAL NEGATIVE: 1
GASTROINTESTINAL NEGATIVE: 1
EYES NEGATIVE: 1

## 2021-03-02 ASSESSMENT — PATIENT HEALTH QUESTIONNAIRE - PHQ9
5. POOR APPETITE OR OVEREATING: SEVERAL DAYS
SUM OF ALL RESPONSES TO PHQ QUESTIONS 1-9: 7

## 2021-03-02 NOTE — PROGRESS NOTES
Cheryle is a 31 year old who is being evaluated via a billable telephone visit.      What phone number would you like to be contacted at? [phone]  How would you like to obtain your AVS? [facility]    Assessment & Plan   Problem List Items Addressed This Visit        Behavioral    ADHD (attention deficit hyperactivity disorder)    Relevant Medications    lisdexamfetamine (VYVANSE) 50 MG capsule    lisdexamfetamine (VYVANSE) 50 MG capsule (Start on 4/2/2021)    lisdexamfetamine (VYVANSE) 50 MG capsule (Start on 5/3/2021)    Moderate episode of recurrent major depressive disorder (H)    Relevant Medications    FLUoxetine (PROZAC) 40 MG capsule    lisdexamfetamine (VYVANSE) 50 MG capsule    lisdexamfetamine (VYVANSE) 50 MG capsule (Start on 4/2/2021)    lisdexamfetamine (VYVANSE) 50 MG capsule (Start on 5/3/2021)       Other    Anxiety state    Relevant Medications    FLUoxetine (PROZAC) 40 MG capsule      Other Visit Diagnoses     PTSD (post-traumatic stress disorder)    -  Primary    Relevant Medications    FLUoxetine (PROZAC) 40 MG capsule    Other Relevant Orders    MENTAL HEALTH REFERRAL  - Adult; Psychiatry; Psychiatry; G: Collaborative Care Psychiatry Service/Bridge to Long-Term Psychiatry as indicated ([phone]); Yes; Diagnostic clarification; Yes; We will contact you to schedule the appointment o...    Major depressive disorder, recurrent episode, in partial remission (H)        Relevant Medications    FLUoxetine (PROZAC) 40 MG capsule    Other Relevant Orders    MENTAL HEALTH REFERRAL  - Adult; Psychiatry; Psychiatry; G: Collaborative Care Psychiatry Service/Bridge to Long-Term Psychiatry as indicated ([phone]); Yes; Diagnostic clarification; Yes; We will contact you to schedule the appointment o...    Acute bronchitis, unspecified organism        Relevant Medications    albuterol (PROAIR HFA/PROVENTIL HFA/VENTOLIN HFA) 108 (90 Base) MCG/ACT inhaler         Refill medication for now- 3  months   Next step is psychiatry referral to [facility] for bipolar diagnosis                   Return in about 2 months (around 5/2/2021) for Specialist Appointment.    DONALD [facility] REGINA Lobato is a 31 year old who presents for the following health issues  accompanied by her self :    HPI     Medication Followup of vyvanse and fluoxetine, albuterol     Taking Medication as prescribed: yes    Side Effects:  None    Medication Helping Symptoms:  yes     Depression and Anxiety/ADD  Follow-Up    How are you doing with your depression since your last visit? No change    How are you doing with your anxiety since your last visit?  No change    Are you having other symptoms that might be associated with depression or anxiety? Yes:  PTSD/bipolar     Have you had a significant life event? No     Do you have any concerns with your use of alcohol or other drugs? No    Therapist suggests possible bipolar 2 disorder.  Patient seeing some benefit from fluoxetine, but still very unmotivated.  Has felt the same for a while without much improvement.     Social History     Tobacco Use     Smoking status: Current Some Day Smoker     Packs/day: 0.25     Years: 0.50     Pack years: 0.12     Types: Cigarettes     Smokeless tobacco: Former User     Tobacco comment: Trying to stop---Hasn't smoked for a week.   Substance Use Topics     Alcohol use: Yes     Comment: minimal     Drug use: Yes     Frequency: 3.0 times per week     Types: Marijuana     PHQ 11/20/2020 12/4/2020 3/2/2021   PHQ-9 Total Score 5 11 7   Q9: Thoughts of better off dead/self-harm past 2 weeks Not at all Not at all Not at all     AAMIR-7 SCORE 9/24/2020 11/20/2020 3/2/2021   Total Score 8 9 4     Therapist was discussing - she has PTSD, probably bipolar.           Suicide Assessment Five-step Evaluation and Treatment (SAFE-T)      How many servings of fruits and vegetables do you eat daily?  0-1    On  average, how many sweetened beverages do you drink each day (Examples: soda, juice, sweet tea, etc.  Do NOT count diet or artificially sweetened beverages)?   0    How many days per week do you exercise enough to make your heart beat faster? 0    How many minutes a day do you exercise enough to make your heart beat faster? 0     How many days per week do you miss taking your medication? 0        Review of Systems   Constitutional: Negative.    HENT: Negative.    Eyes: Negative.    Respiratory: Negative.    Cardiovascular: Negative.    Gastrointestinal: Negative.    Genitourinary: Negative.    Musculoskeletal: Negative.    Skin: Negative.    Neurological: Negative.    Psychiatric/Behavioral:        As in HPI            Objective           Vitals:  No vitals were obtained today due to virtual visit.    Physical Exam   healthy, alert and no distress  PSYCH: Alert and oriented times 3; coherent speech, normal   rate and volume, able to articulate logical thoughts, able   to abstract reason, no tangential thoughts, no hallucinations   or delusions  Her affect is normal  RESP: No cough, no audible wheezing, able to talk in full sentences  Remainder of exam unable to be completed due to telephone visits                Phone call duration: 9 minutes

## 2021-03-03 ASSESSMENT — ANXIETY QUESTIONNAIRES: GAD7 TOTAL SCORE: 4

## 2021-03-15 ENCOUNTER — ALLIED HEALTH/NURSE VISIT (OUTPATIENT)
Dept: NURSING | Facility: CLINIC | Age: 31
End: 2021-03-15
Payer: COMMERCIAL

## 2021-03-15 DIAGNOSIS — Z23 NEED FOR VACCINATION: Primary | ICD-10-CM

## 2021-03-15 PROCEDURE — 90471 IMMUNIZATION ADMIN: CPT

## 2021-03-15 PROCEDURE — 90746 HEPB VACCINE 3 DOSE ADULT IM: CPT

## 2021-03-15 PROCEDURE — 99207 PR NO CHARGE NURSE ONLY: CPT

## 2021-03-29 DIAGNOSIS — J20.9 ACUTE BRONCHITIS, UNSPECIFIED ORGANISM: ICD-10-CM

## 2021-04-05 NOTE — TELEPHONE ENCOUNTER
2nd attempt  Left message to complete mychart questions.    Sudha ROTHMANRN BSN  [facility]  [phone]

## 2021-04-08 DIAGNOSIS — J20.9 ACUTE BRONCHITIS, UNSPECIFIED ORGANISM: ICD-10-CM

## 2021-04-08 RX ORDER — ALBUTEROL SULFATE 90 UG/1
1-2 AEROSOL, METERED RESPIRATORY (INHALATION) EVERY 6 HOURS
Qty: 8 G | Refills: 0 | Status: CANCELLED | OUTPATIENT
Start: 2021-04-08

## 2021-04-08 RX ORDER — ALBUTEROL SULFATE 90 UG/1
1-2 AEROSOL, METERED RESPIRATORY (INHALATION) EVERY 6 HOURS
Qty: 8 G | Refills: 0 | Status: SHIPPED | OUTPATIENT
Start: 2021-04-08 | End: 2021-09-30

## 2021-05-03 PROBLEM — M25.561 RIGHT KNEE PAIN: Status: RESOLVED | Noted: 2020-12-10 | Resolved: 2021-05-03

## 2021-05-03 PROBLEM — S83.091S: Status: RESOLVED | Noted: 2020-12-10 | Resolved: 2021-05-03

## 2021-05-03 NOTE — PROGRESS NOTES
"Discharge Note    Progress reporting period is from 12-10-20 to Jan 28, 2021.      Cheryle failed to follow up and current status is unknown.  Please see information below for last relevant information on current status.  Patient seen for 6 visits.    SUBJECTIVE  At last visit patient reported doing REIL and REIL with pt OP couple times/day.  Helped, but then tripped/stepped on ball at home 2 days prior.  She caught herself but had pain buttock and LB.   Continues with intermittent right buttock pain 0-3/10, intermittent \"shooting\" posterior right thigh.    Not limping due to knee, intermittently due to LB.  \"Weakness\"  knee only noticable with stairs - doing mostly reciprocal.  Overall feels \"98%\" with knee.  No pain knee.   .  Current pain level is 0/10.     Previous pain level was  (0-5/10 ).   Changes in function:  Yes (See Goal flowsheet attached for changes in current functional level)  Adverse reaction to treatment or activity: None    OBJECTIVE  Unknown at discharge as patient did not return for final visit as planned.      ASSESSMENT/PLAN  Diagnosis: R knee pain s/p patellar dislocation    STG/LTGs have been met or progress has been made towards goals:  Yes, please see goal flowsheet for most current information   Patient did not return for further visits so problems/goal status is unknown.    Recommendations:  Patient did not return for follow-up visits as expected.  Will discharge physical therapy chart at this time.      Please refer to the daily flowsheet for treatment today, total treatment time and time spent performing 1:1 timed codes.        "

## 2021-06-16 ENCOUNTER — OFFICE VISIT (OUTPATIENT)
Dept: URGENT CARE | Facility: URGENT CARE | Age: 31
End: 2021-06-16
Payer: COMMERCIAL

## 2021-06-16 VITALS
DIASTOLIC BLOOD PRESSURE: 64 MMHG | TEMPERATURE: 97.6 F | BODY MASS INDEX: 53.23 KG/M2 | OXYGEN SATURATION: 98 % | RESPIRATION RATE: 20 BRPM | WEIGHT: 293 LBS | HEART RATE: 76 BPM | SYSTOLIC BLOOD PRESSURE: 120 MMHG

## 2021-06-16 DIAGNOSIS — R10.2 VAGINAL PAIN: Primary | ICD-10-CM

## 2021-06-16 DIAGNOSIS — R39.11 URINARY HESITANCY: ICD-10-CM

## 2021-06-16 DIAGNOSIS — B00.9 HERPES: ICD-10-CM

## 2021-06-16 LAB
ALBUMIN UR-MCNC: NEGATIVE MG/DL
APPEARANCE UR: CLEAR
BILIRUB UR QL STRIP: NEGATIVE
COLOR UR AUTO: YELLOW
GLUCOSE UR STRIP-MCNC: NEGATIVE MG/DL
HGB UR QL STRIP: NEGATIVE
KETONES UR STRIP-MCNC: NEGATIVE MG/DL
LEUKOCYTE ESTERASE UR QL STRIP: NEGATIVE
NITRATE UR QL: NEGATIVE
PH UR STRIP: 6.5 PH (ref 5–7)
SOURCE: NORMAL
SP GR UR STRIP: 1.02 (ref 1–1.03)
SPECIMEN SOURCE: NORMAL
UROBILINOGEN UR STRIP-ACNC: 0.2 EU/DL (ref 0.2–1)
WET PREP SPEC: NORMAL

## 2021-06-16 PROCEDURE — 99214 OFFICE O/P EST MOD 30 MIN: CPT | Performed by: PHYSICIAN ASSISTANT

## 2021-06-16 PROCEDURE — 87491 CHLMYD TRACH DNA AMP PROBE: CPT | Performed by: PHYSICIAN ASSISTANT

## 2021-06-16 PROCEDURE — 87210 SMEAR WET MOUNT SALINE/INK: CPT | Performed by: PHYSICIAN ASSISTANT

## 2021-06-16 PROCEDURE — 81003 URINALYSIS AUTO W/O SCOPE: CPT | Performed by: PHYSICIAN ASSISTANT

## 2021-06-16 PROCEDURE — 87591 N.GONORRHOEAE DNA AMP PROB: CPT | Performed by: PHYSICIAN ASSISTANT

## 2021-06-16 RX ORDER — VALACYCLOVIR HYDROCHLORIDE 500 MG/1
500 TABLET, FILM COATED ORAL 2 TIMES DAILY
Qty: 6 TABLET | Refills: 0 | Status: SHIPPED | OUTPATIENT
Start: 2021-06-16 | End: 2021-09-30

## 2021-06-16 RX ORDER — ZINC OXIDE 20 %
OINTMENT (GRAM) TOPICAL PRN
Qty: 60 G | Refills: 0 | Status: SHIPPED | OUTPATIENT
Start: 2021-06-16 | End: 2021-09-30

## 2021-06-17 NOTE — PROGRESS NOTES
Patient presents with:  Vaginal Problem: dryness, and outer Labia area-painful for about 1 week. [place]  beach all last week( 2 urine samples collected)    (R10.2) Vaginal pain  (primary encounter diagnosis)  Comment:   Plan: Wet prep, NEISSERIA GONORRHOEA PCR, CHLAMYDIA         TRACHOMATIS PCR, zinc oxide (DESITIN) 20 %         external ointment, acetaminophen-codeine         (TYLENOL #3) 300-30 MG tablet            (R39.11) Urinary hesitancy  Comment:   Plan: *UA reflex to Microscopic and Culture ([place]         and [facility] (except [place] and         [place])            (B00.9) Herpes  Comment:   Plan: valACYclovir (VALTREX) 500 MG tablet                SUBJECTIVE:   Cheryle Vazquez is a 31 year old female who presents today with painful skin in the vaginal area after [place].      She does have a h/o herpes.      Denies any vaginal discharge.        Past Medical History:   Diagnosis Date     Acid reflux      ADHD (attention deficit hyperactivity disorder)      Depressive disorder      Eczema          Current Outpatient Medications   Medication Sig Dispense Refill     Multiple Vitamins-Iron (DAILY-ZAID/IRON/BETA-CAROTENE) TABS TAKE 1 TABLET BY MOUTH DAILY. (Patient not taking: Reported on 10/19/2020) 30 tablet 7     Social History     Tobacco Use     Smoking status: Never Smoker     Smokeless tobacco: Never Used   Substance Use Topics     Alcohol use: Not on file     Family History   Problem Relation Age of Onset     Diabetes Mother      Diabetes Father          ROS:    10 point ROS of systems including Constitutional, Eyes, Respiratory, Cardiovascular, Gastroenterology, Genitourinary, Integumentary, Muscularskeletal, Psychiatric ,neurological were all negative except for pertinent positives noted in my HPI       OBJECTIVE:  /64   Pulse 76   Temp 97.6  F (36.4  C)   Resp 20   Wt 142.9 kg (315 lb)   LMP  (LMP Unknown)   SpO2 98%   BMI 53.23 kg/m    Physical  Exam:  GENERAL APPEARANCE: healthy, alert and no distress  ABDOMEN:  soft, nontender, no HSM or masses and bowel sounds normal  GU_female: irritated erythematous skin on labia without discharge.  No specific ulcers.    NEURO: Normal strength and tone, sensory exam grossly normal,  normal speech and mentation  SKIN: no suspicious lesions or rashes

## 2021-06-17 NOTE — PATIENT INSTRUCTIONS
Patient Education     Living with Herpes  To speed healing, take care of open herpes sores. To reduce outbreaks, take care of your health. And talk with your healthcare provider about antiviral medicines. To keep from infecting others, get treatment and talk with your provider about medicines to reduce spreading the virus. Learn other ways to prevent spreading the virus.  To ease symptoms    Start episodic treatment at the first sign of symptoms, such as itching or tingling. Call your healthcare provider at the first sign of an outbreak. Starting antiviral medicine in the first 2 days of an outbreak may lead to faster healing.    Take ibuprofen or acetaminophen to limit any pain.    Sit in a warm or cool bath or use a moist compress to reduce the itching of sores. For some women, genital outbreaks cause burning during urination. In such cases, urinating in a tub of warm water helps reduce burning.    Wear white cotton underwear and loose clothing during outbreaks. Don t wear nylon underwear or tight clothes. They can prevent sores from healing.    To speed healing    Wash sores with mild soap and water. Pat (don't rub) the sores completely dry.    Always wash your hands with clean, warm or cold water for at least 20 seconds after touching a sore. Or use an alcohol-based hand .    Don t bandage sores. Air helps them heal.    Don't use any ointment unless it is prescribed. Using the wrong jelly or cream may hold in moisture and slow healing.    Don t pick at the sores. This can slow healing. It might also get a sore infected.    If you wear contacts, wash your hands well with soap and water before putting them in.    To reduce outbreaks    Eat a balanced diet.    Get plenty of sleep. This helps your immune system work its best.    Limit stress and tension. Both can weaken the body s defenses.    Limit exposure to sun, wind, and extreme heat or cold. Wear sunscreen and lip balm to help prevent  outbreaks.    Talk with your healthcare provider about antiviral medicines to reduce outbreaks.    To protect others    Tell your current sex partner and any future partners that you have herpes. If you don t know what to say, ask your healthcare provider for help.    Use a latex condom that covers the affected areas each time you have sex. This reduces the risk of passing herpes to your partner. But remember a condom may not cover all of the areas that have sores. Be certain to put the condom on the right way. Check this CDC website for correct male condom use: www.cdc.gov/male-condom-use. For correct use of female condoms go to www.cdc.gov/female-condom-use.    Don't kiss or have oral sex when you have an oral sore.    Don't have intercourse when genital sores are present. Also keep in mind, herpes can be passed during oral sex and with anal contact.    Don t share towels, toothbrushes, lip balm, or lipstick when you have a sore.    If you have keep having outbreaks, taking daily antiviral medicines can help reduce the likelihood of transmission to your partner.    [employer] last reviewed this educational content on 6/1/2019 2000-2021 [employer]. All rights reserved. This information is not intended as a substitute for professional medical care. Always follow your healthcare professional's instructions.           Patient Education     Herpes: Caring for Sores  Good hygiene matters when you have herpes. Take care of your sores to speed healing. Sores that are not cared for correctly can lead to other infections.     Warm baths can ease itching and burning caused by sores.   To ease your symptoms    Take any medicines as directed.    Take acetaminophen or ibuprofen for pain.    Take warm or cool baths to ease itching of sores. And don t share towels or other personal items when you have a sore.    Women may find it helpful to urinate in a tub of warm water to prevent burning.    Don t wear tight  clothes or nylon underwear. They can trap moisture, cause chafing, and prevent sores from healing.    To speed healing    Wash the sores with mild soap and water. And wash your hands for 15 to 30 seconds after you touch a sore.    Dry the affected area completely by patting a towel over it. Don't rub. Don't share towels.    Don t bandage sores. The dry air helps them heal.    Don't use ointments unless they are prescribed. They hold in moisture and may cause other infections.    Don t pick at the sores. This can slow healing.    Don t touch your eyes when you have a sore. The virus may travel from your fingertips to your eyes.    For more information  American Sexual [employer] Association STI Hotline, 721.530.3213, www.ashasexualhealth.org/  CDC, 438.456.8467, www.cdc.gov/std  Philip last reviewed this educational content on 6/1/2019 2000-2021 [employer]. All rights reserved. This information is not intended as a substitute for professional medical care. Always follow your healthcare professional's instructions.

## 2021-06-24 ENCOUNTER — OFFICE VISIT (OUTPATIENT)
Dept: URGENT CARE | Facility: URGENT CARE | Age: 31
End: 2021-06-24
Payer: COMMERCIAL

## 2021-06-24 VITALS
SYSTOLIC BLOOD PRESSURE: 119 MMHG | HEIGHT: 64 IN | WEIGHT: 293 LBS | DIASTOLIC BLOOD PRESSURE: 79 MMHG | BODY MASS INDEX: 50.02 KG/M2 | TEMPERATURE: 98.2 F | HEART RATE: 84 BPM

## 2021-06-24 DIAGNOSIS — R30.0 DYSURIA: ICD-10-CM

## 2021-06-24 DIAGNOSIS — N30.00 ACUTE CYSTITIS WITHOUT HEMATURIA: Primary | ICD-10-CM

## 2021-06-24 LAB
ALBUMIN UR-MCNC: 100 MG/DL
APPEARANCE UR: ABNORMAL
BACTERIA #/AREA URNS HPF: ABNORMAL /HPF
BILIRUB UR QL STRIP: NEGATIVE
COLOR UR AUTO: YELLOW
GLUCOSE UR STRIP-MCNC: NEGATIVE MG/DL
HGB UR QL STRIP: ABNORMAL
KETONES UR STRIP-MCNC: NEGATIVE MG/DL
LEUKOCYTE ESTERASE UR QL STRIP: ABNORMAL
NITRATE UR QL: POSITIVE
NON-SQ EPI CELLS #/AREA URNS LPF: ABNORMAL /LPF
PH UR STRIP: 7 PH (ref 5–7)
RBC #/AREA URNS AUTO: >100 /HPF
SOURCE: ABNORMAL
SP GR UR STRIP: 1.02 (ref 1–1.03)
UROBILINOGEN UR STRIP-ACNC: 0.2 EU/DL (ref 0.2–1)
WBC #/AREA URNS AUTO: ABNORMAL /HPF

## 2021-06-24 PROCEDURE — 81001 URINALYSIS AUTO W/SCOPE: CPT | Performed by: PHYSICIAN ASSISTANT

## 2021-06-24 PROCEDURE — 87086 URINE CULTURE/COLONY COUNT: CPT | Performed by: PHYSICIAN ASSISTANT

## 2021-06-24 PROCEDURE — 99213 OFFICE O/P EST LOW 20 MIN: CPT | Performed by: PHYSICIAN ASSISTANT

## 2021-06-24 RX ORDER — NITROFURANTOIN 25; 75 MG/1; MG/1
100 CAPSULE ORAL 2 TIMES DAILY
Qty: 10 CAPSULE | Refills: 0 | Status: SHIPPED | OUTPATIENT
Start: 2021-06-24 | End: 2021-06-29

## 2021-06-24 ASSESSMENT — MIFFLIN-ST. JEOR: SCORE: 2178.73

## 2021-06-24 NOTE — PROGRESS NOTES
Dysuria  - UA with Microscopic reflex to Culture  - nitroFURantoin macrocrystal-monohydrate (MACROBID) 100 MG capsule; Take 1 capsule (100 mg) by mouth 2 times daily for 5 days    Acute cystitis without hematuria  - nitroFURantoin macrocrystal-monohydrate (MACROBID) 100 MG capsule; Take 1 capsule (100 mg) by mouth 2 times daily for 5 days    20 minutes spent on the date of the encounter doing chart review, history and exam, documentation and further activities per the note.     See Patient Instructions  Patient Instructions       Patient Education     Dysuria  Dysuria is when you have pain during urination. It is often described as a burning feeling. Learn more about this problem and how it can be treated.     Painful urination (dysuria) is often caused by a problem in the urinary tract.   What causes dysuria?  Possible causes include:    Infection with a bacteria or virus such as a urinary tract infection (UTI) or a sexually transmitted infection (STI)    Sensitivity or allergy to chemicals such as those found in lotions and other products    Prostate or bladder problems    Radiation therapy to the pelvic area  How is dysuria diagnosed?  Your healthcare provider will examine you. He or she will ask about your symptoms and health. After talking with you and doing a physical exam, your healthcare provider may know what is causing your dysuria. You will often have to give a urine sample. Tests of your urine (urinalysis) are done. A urinalysis may include:    Looking at the urine sample (visual exam)    Checking for substances (chemical exam)    Looking at a small amount of the urine under a microscope (microscopic exam)  Some parts of the urinalysis may be done in the provider's office and some in a lab. The urine sample may also be checked for bacteria and yeast (urine culture). Your healthcare provider will tell you more about these tests if they are needed.  How is dysuria treated?  Treatment depends on the  cause. If you have a bacterial infection, you may need antibiotics. You may be given medicines to make it easier for you to urinate and help ease pain. Your healthcare provider can tell you more about your treatment options. If not treated, symptoms may get worse.  When to call your healthcare provider  Call the healthcare provider right away if you have any of the following:    Fever of  100.4  F ( 38 C) or higher, or as directed by your provider    No improvement after 3 days of treatment    Trouble urinating because of pain    New or increased discharge from the vagina or penis    Rash or joint pain    Increased back or belly pain    Enlarged painful lymph nodes (lumps) in the groin  [employer] last reviewed this educational content on 4/1/2019 2000-2021 [employer]. All rights reserved. This information is not intended as a substitute for professional medical care. Always follow your healthcare professional's instructions.               DONALD [facility] URGENT CARE    Subjective   31 year old who presents to clinic today for the following health issues:    Urgent Care       HPI     Genitourinary - Female  Onset/Duration: This morning  Description:   Painful urination (Dysuria): YES           Frequency: YES  Blood in urine (Hematuria): no  Delay in urine (Hesitency): no  Intensity: moderate   Progression of Symptoms:  worsening  Accompanying Signs & Symptoms:  Fever/chills: no  Flank pain: no  Nausea and vomiting: no  Vaginal symptoms: none  Abdominal/Pelvic Pain: YES  History:   History of frequent UTI s: YES  History of kidney stones: no  Precipitating or alleviating factors: None  Therapies tried and outcome: Increase fluid intake      Review of Systems   Review of Systems   See HPI     Objective    Temp: 98.2  F (36.8  C) Temp src: Tympanic BP: 119/79 Pulse: 84             Physical Exam   Physical Exam  Constitutional:       General: She is not in acute distress.      Appearance: Normal appearance. She is normal weight. She is not ill-appearing, toxic-appearing or diaphoretic.   HENT:      Head: Normocephalic and atraumatic.   Cardiovascular:      Rate and Rhythm: Normal rate and regular rhythm.      Pulses: Normal pulses.      Heart sounds: Normal heart sounds. No murmur. No friction rub. No gallop.    Pulmonary:      Effort: Pulmonary effort is normal. No respiratory distress.      Breath sounds: Normal breath sounds. No stridor. No wheezing, rhonchi or rales.   Chest:      Chest wall: No tenderness.   Abdominal:      General: Abdomen is flat. Bowel sounds are normal. There is no distension.      Palpations: Abdomen is soft. There is no mass.      Tenderness: There is no abdominal tenderness. There is no right CVA tenderness, left CVA tenderness, guarding or rebound.      Hernia: No hernia is present.   Neurological:      General: No focal deficit present.      Mental Status: She is alert and oriented to person, place, and time. Mental status is at baseline.      Gait: Gait normal.   Psychiatric:         Mood and Affect: Mood normal.         Behavior: Behavior normal.         Thought Content: Thought content normal.         Judgment: Judgment normal.          Results for orders placed or performed in visit on 06/24/21 (from the past 24 hour(s))   UA with Microscopic reflex to Culture    Specimen: Midstream Urine   Result Value Ref Range    Color Urine Yellow     Appearance Urine Slightly Cloudy     Glucose Urine Negative NEG^Negative mg/dL    Bilirubin Urine Negative NEG^Negative    Ketones Urine Negative NEG^Negative mg/dL    Specific Gravity Urine 1.025 1.003 - 1.035    pH Urine 7.0 5.0 - 7.0 pH    Protein Albumin Urine 100 (A) NEG^Negative mg/dL    Urobilinogen Urine 0.2 0.2 - 1.0 EU/dL    Nitrite Urine Positive (A) NEG^Negative    Blood Urine Large (A) NEG^Negative    Leukocyte Esterase Urine Small (A) NEG^Negative    Source Midstream Urine     WBC Urine 10-25 (A) OTO5^0 -  5 /HPF    RBC Urine >100 (A) OTO2^O - 2 /HPF    Squamous Epithelial /LPF Urine Few FEW^Few /LPF    Bacteria Urine Moderate (A) NEG^Negative /HPF

## 2021-06-24 NOTE — PATIENT INSTRUCTIONS
Patient Education     Dysuria  Dysuria is when you have pain during urination. It is often described as a burning feeling. Learn more about this problem and how it can be treated.     Painful urination (dysuria) is often caused by a problem in the urinary tract.   What causes dysuria?  Possible causes include:    Infection with a bacteria or virus such as a urinary tract infection (UTI) or a sexually transmitted infection (STI)    Sensitivity or allergy to chemicals such as those found in lotions and other products    Prostate or bladder problems    Radiation therapy to the pelvic area  How is dysuria diagnosed?  Your healthcare provider will examine you. He or she will ask about your symptoms and health. After talking with you and doing a physical exam, your healthcare provider may know what is causing your dysuria. You will often have to give a urine sample. Tests of your urine (urinalysis) are done. A urinalysis may include:    Looking at the urine sample (visual exam)    Checking for substances (chemical exam)    Looking at a small amount of the urine under a microscope (microscopic exam)  Some parts of the urinalysis may be done in the provider's office and some in a lab. The urine sample may also be checked for bacteria and yeast (urine culture). Your healthcare provider will tell you more about these tests if they are needed.  How is dysuria treated?  Treatment depends on the cause. If you have a bacterial infection, you may need antibiotics. You may be given medicines to make it easier for you to urinate and help ease pain. Your healthcare provider can tell you more about your treatment options. If not treated, symptoms may get worse.  When to call your healthcare provider  Call the healthcare provider right away if you have any of the following:    Fever of  100.4  F ( 38 C) or higher, or as directed by your provider    No improvement after 3 days of treatment    Trouble urinating because of pain    New  or increased discharge from the vagina or penis    Rash or joint pain    Increased back or belly pain    Enlarged painful lymph nodes (lumps) in the groin  [email or web address] last reviewed this educational content on 4/1/2019 2000-2021 [employer]. All rights reserved. This information is not intended as a substitute for professional medical care. Always follow your healthcare professional's instructions.

## 2021-06-25 LAB
BACTERIA SPEC CULT: NORMAL
BACTERIA SPEC CULT: NORMAL
SPECIMEN SOURCE: NORMAL

## 2021-07-20 ENCOUNTER — OFFICE VISIT (OUTPATIENT)
Dept: URGENT CARE | Facility: URGENT CARE | Age: 31
End: 2021-07-20
Payer: COMMERCIAL

## 2021-07-20 VITALS
OXYGEN SATURATION: 98 % | TEMPERATURE: 97.8 F | WEIGHT: 293 LBS | DIASTOLIC BLOOD PRESSURE: 75 MMHG | BODY MASS INDEX: 50.02 KG/M2 | HEART RATE: 61 BPM | HEIGHT: 64 IN | SYSTOLIC BLOOD PRESSURE: 121 MMHG

## 2021-07-20 DIAGNOSIS — R30.0 DYSURIA: ICD-10-CM

## 2021-07-20 DIAGNOSIS — N76.0 BACTERIAL VAGINOSIS: ICD-10-CM

## 2021-07-20 DIAGNOSIS — N30.90 BLADDER INFECTION: Primary | ICD-10-CM

## 2021-07-20 DIAGNOSIS — B96.89 BACTERIAL VAGINOSIS: ICD-10-CM

## 2021-07-20 LAB
ALBUMIN UR-MCNC: NEGATIVE MG/DL
APPEARANCE UR: CLEAR
BACTERIA #/AREA URNS HPF: ABNORMAL /HPF
BILIRUB UR QL STRIP: NEGATIVE
CLUE CELLS: PRESENT
COLOR UR AUTO: YELLOW
GLUCOSE UR STRIP-MCNC: NEGATIVE MG/DL
HGB UR QL STRIP: ABNORMAL
KETONES UR STRIP-MCNC: NEGATIVE MG/DL
LEUKOCYTE ESTERASE UR QL STRIP: ABNORMAL
NITRATE UR QL: NEGATIVE
PH UR STRIP: 6.5 [PH] (ref 5–7)
RBC #/AREA URNS AUTO: ABNORMAL /HPF
SP GR UR STRIP: 1.01 (ref 1–1.03)
SQUAMOUS #/AREA URNS AUTO: ABNORMAL /LPF
TRICHOMONAS, WET PREP: ABNORMAL
UROBILINOGEN UR STRIP-ACNC: 0.2 E.U./DL
WBC #/AREA URNS AUTO: ABNORMAL /HPF
WBC'S/HIGH POWER FIELD, WET PREP: ABNORMAL
YEAST, WET PREP: ABNORMAL

## 2021-07-20 PROCEDURE — 87086 URINE CULTURE/COLONY COUNT: CPT | Performed by: PHYSICIAN ASSISTANT

## 2021-07-20 PROCEDURE — 81001 URINALYSIS AUTO W/SCOPE: CPT | Performed by: PHYSICIAN ASSISTANT

## 2021-07-20 PROCEDURE — 99214 OFFICE O/P EST MOD 30 MIN: CPT | Performed by: PHYSICIAN ASSISTANT

## 2021-07-20 PROCEDURE — 87210 SMEAR WET MOUNT SALINE/INK: CPT | Performed by: PHYSICIAN ASSISTANT

## 2021-07-20 PROCEDURE — 87186 SC STD MICRODIL/AGAR DIL: CPT | Performed by: PHYSICIAN ASSISTANT

## 2021-07-20 RX ORDER — PHENAZOPYRIDINE HYDROCHLORIDE 100 MG/1
100 TABLET, FILM COATED ORAL 3 TIMES DAILY PRN
Qty: 12 TABLET | Refills: 0 | Status: SHIPPED | OUTPATIENT
Start: 2021-07-20 | End: 2021-09-30

## 2021-07-20 RX ORDER — CEFDINIR 300 MG/1
300 CAPSULE ORAL 2 TIMES DAILY
Qty: 14 CAPSULE | Refills: 0 | Status: SHIPPED | OUTPATIENT
Start: 2021-07-20 | End: 2021-07-27

## 2021-07-20 RX ORDER — METRONIDAZOLE 7.5 MG/G
1 GEL VAGINAL AT BEDTIME
Qty: 70 G | Refills: 0 | Status: SHIPPED | OUTPATIENT
Start: 2021-07-20 | End: 2021-07-25

## 2021-07-20 RX ORDER — FLUCONAZOLE 150 MG/1
TABLET ORAL
Qty: 2 TABLET | Refills: 0 | Status: SHIPPED | OUTPATIENT
Start: 2021-07-20 | End: 2021-09-30

## 2021-07-20 ASSESSMENT — MIFFLIN-ST. JEOR: SCORE: 2151.51

## 2021-07-20 NOTE — PATIENT INSTRUCTIONS
(N30.90) Bladder infection  (primary encounter diagnosis)  Comment:   Plan: UA Macro with Reflex to Micro and Culture - lab        collect, Urine Microscopic Exam, Urine Culture,        cefdinir (OMNICEF) 300 MG capsule            (R30.0) Dysuria  Comment: consistent with likely yeast  Plan: Wet prep - Clinic Collect, fluconazole         (DIFLUCAN) 150 MG tablet        Take 1 diflucan today and 1 after finishing antibiotic    Take a probiotic.          \    Patient Education     Bladder Infection, Female (Adult)     Urine normally doesn't have any germs (bacteria) in it. But bacteria can get into the urinary tract from the skin around the rectum. Or they can travel in the blood from other parts of the body. Once they are in your urinary tract, they can cause infection in these areas:    The urethra (urethritis)    The bladder (cystitis)    The kidneys (pyelonephritis)  The most common place for an infection is in the bladder. This is called a bladder infection. This is one of the most common infections in women. Most bladder infections are easily treated. They are not serious unless the infection spreads to the kidney.  The terms bladder infection, UTI, and cystitis are often used to describe the same thing. But they are not always the same. Cystitis is an inflammation of the bladder. The most common cause of cystitis is an infection.  Symptoms  The infection causes inflammation in the urethra and bladder. This causes many of the symptoms. The most common symptoms of a bladder infection are:    Pain or burning when urinating    Having to urinate more often than normal    Urgent need to urinate    Only a small amount of urine comes out    Blood in urine    Belly (abdominal) discomfort. This is often in the lower belly above the pubic bone.    Cloudy urine    Strong- or bad-smelling urine    Unable to urinate (urinary retention)    Unable to hold urine in (urinary incontinence)    Fever    Loss of  appetite    Confusion (in older adults)  Causes  Bladder infections are not contagious. You can't get one from someone else, from a toilet seat, or from sharing a bath.  The most common cause of bladder infections is bacteria from the bowels. The bacteria get onto the skin around the opening of the urethra. From there, they can get into the urine. Then they travel up to the bladder, causing inflammation and infection. This often happens because of:    Wiping incorrectly after urinating. Always wipe from front to back.    Bowel incontinence    Pregnancy    Procedures such as having a catheter put in    Older age    Not emptying your bladder. This can give bacteria a chance to grow in your urine.    Fluid loss (dehydration)    Constipation    Having sex    Using a diaphragm for birth control   Treatment  Bladder infections are diagnosed by a urine test and urine culture. They are treated with antibiotics. They often clear up quickly without problems. Treatment helps prevent a more serious kidney infection.  Medicines  Medicines can help in the treatment of a bladder infection:    Take antibiotics until they are used up, even if you feel better. It's important to finish them to make sure the infection has cleared.    You can use acetaminophen or ibuprofen for pain, fever, or discomfort, unless another medicine was prescribed. If you have long-term (chronic) liver or kidney disease, talk with your healthcare provider before using these medicines. Also talk with your provider if you've ever had a stomach ulcer or GI (gastrointestinal) bleeding, or are taking blood-thinner medicines.    If you are given phenazopydridine to reduce burning with urination, it will make your urine a bright orange color. This can stain clothing.  Care and prevention  These self-care steps can help prevent future infections:    Drink plenty of fluids. This helps to prevent dehydration and flush out your bladder. Do this unless you must restrict  fluids for other health reasons, or your healthcare provider told you not to.    Clean yourself correctly after going to the bathroom. Wipe from front to back after using the toilet. This helps prevent the spread of bacteria.    Urinate more often. Don't try to hold urine in for a long time.    Wear loose-fitting clothes and cotton underwear. Don't wear tight-fitting pants.    Improve your diet and prevent constipation. Eat more fresh fruits and vegetables, and fiber. Eat less junk foods and fatty foods.    Don't have sex until your symptoms are gone.    Don't have caffeine, alcohol, and spicy foods. These can irritate your bladder.    Urinate right after you have sex to flush out your bladder.    If you use birth control pills and have frequent bladder infections, discuss it with your healthcare provider.  Follow-up care  Call your healthcare provider if all symptoms are not gone after 3 days of treatment. This is especially important if you have repeat infections.  If a culture was done, you will be told if your treatment needs to be changed. If directed, you can call to find out the results.  If X-rays were done, you will be told if the results will affect your treatment.  Call 911  Call 911 if any of the following occur:    Trouble breathing    Hard to wake up or confusion    Fainting (loss of consciousness)    Fast heart rate  When to get medical advice  Call your healthcare provider right away if any of these occur:    Fever of 100.4 F (38.0 C) or higher, or as directed by your healthcare provider    Symptoms are not better after 3 days of treatment    Back or belly pain that gets worse    Repeated vomiting, or unable to keep medicine down    Weakness or dizziness    Vaginal discharge    Pain, redness, or swelling in the outer vaginal area (labia)  [employer] last reviewed this educational content on 11/1/2019 2000-2021 [employer]. All rights reserved. This information is not intended as a  substitute for professional medical care. Always follow your healthcare professional's instructions.           Patient Education     Understanding Urinary Tract Infections (UTIs)   Most UTIs are caused by bacteria, but they may also be caused by viruses or fungi. Bacteria from the bowel are the most common source of infection. The infection may start because of any of the following:     Sexual activity.  During sex, bacteria can travel from the penis, vagina, or rectum into the urethra.     Bacteria outside the rectum getting into the urethra.  Bacteria on the skin outside the rectum may travel into the urethra. This is more common in women since the rectum and urethra are closer to each other than in men. Wiping from front to back after using the toilet and keeping the area clean can help prevent germs from getting to the urethra.    Blocked urine flow through the urinary tract. If urine sits too long, germs may start to grow out of control.  Parts of the urinary tract  The infection can occur in any part of the urinary tract.       The kidneys. These organs collect and store urine.    The ureters. These tubes carry urine from the kidneys to the bladder.    The bladder. This holds urine until you are ready to let it out.    The urethra. This tube carries urine from the bladder out of the body. It is shorter in women, so bacteria can move through it more easily. The urethra is longer in men, so a UTI is less likely to reach the bladder or kidneys in men.  [employer] last reviewed this educational content on 1/1/2020 2000-2021 [employer]. All rights reserved. This information is not intended as a substitute for professional medical care. Always follow your healthcare professional's instructions.

## 2021-07-20 NOTE — PROGRESS NOTES
"Patient presents with:  Urgent Care: ongoing bladder infection     (N30.90) Bladder infection  (primary encounter diagnosis)  Comment:   Plan: UA Macro with Reflex to Micro and Culture - lab        collect, Urine Microscopic Exam, Urine Culture,        cefdinir (OMNICEF) 300 MG capsule            (R30.0) Dysuria  Comment:   Plan: Wet prep - Clinic Collect, fluconazole         (DIFLUCAN) 150 MG tablet, phenazopyridine         (PYRIDIUM) 100 MG tablet            (N76.0,  B96.89) Bacterial vaginosis  Comment:   Plan: metroNIDAZOLE (METROGEL-VAGINAL) 0.75 % vaginal        gel              SUBJECTIVE:   Cheryle Vazquez is a 31 year old female who presents with persistent dysuria and urgency with frequency.  Was treated for HSV recently  And perineal irritation      Past Medical History:   Diagnosis Date     Acid reflux      ADHD (attention deficit hyperactivity disorder)      Depressive disorder      Eczema          Current Outpatient Medications   Medication Sig Dispense Refill     Multiple Vitamins-Iron (DAILY-ZAID/IRON/BETA-CAROTENE) TABS TAKE 1 TABLET BY MOUTH DAILY. (Patient not taking: Reported on 10/19/2020) 30 tablet 7     Social History     Tobacco Use     Smoking status: Never Smoker     Smokeless tobacco: Never Used   Substance Use Topics     Alcohol use: Not on file     Family History   Problem Relation Age of Onset     Diabetes Mother      Diabetes Father          ROS:    10 point ROS of systems including Constitutional, Eyes, Respiratory, Cardiovascular, Gastroenterology, Genitourinary, Integumentary, Muscularskeletal, Psychiatric ,neurological were all negative except for pertinent positives noted in my HPI       OBJECTIVE:  /75   Pulse 61   Temp 97.8  F (36.6  C)   Ht 1.626 m (5' 4\")   Wt 145.2 kg (320 lb)   SpO2 98%   BMI 54.93 kg/m    Physical Exam:  GENERAL APPEARANCE: healthy, alert and no distress  RESP: lungs clear to auscultation - no rales, rhonchi or wheezes  CV: regular rates " and rhythm, normal S1 S2, no murmur noted  ABDOMEN:  soft, nontender, no HSM or masses and bowel sounds normal  GU_female: deferred  NEURO: Normal strength and tone, sensory exam grossly normal,  normal speech and mentation  SKIN: no suspicious lesions or rashes

## 2021-07-22 LAB — BACTERIA UR CULT: ABNORMAL

## 2021-09-24 ENCOUNTER — NURSE TRIAGE (OUTPATIENT)
Dept: NURSING | Facility: CLINIC | Age: 31
End: 2021-09-24

## 2021-09-24 NOTE — TELEPHONE ENCOUNTER
Patient calling in today stating  She needs an appointment for her anxiety and ADHD medication  She would like to speak about diet, nutrition and being healthier.   Patient was transferred to [facility].  COVID 19 Nurse Triage Plan/Patient Instructions    Please be aware that novel coronavirus (COVID-19) may be circulating in the community. If you develop symptoms such as fever, cough, or SOB or if you have concerns about the presence of another infection including coronavirus (COVID-19), please contact your health care provider or visit [email or web address].     Disposition/Instructions    In-Person Visit with provider recommended. Reference Visit Selection Guide.    Thank you for taking steps to prevent the spread of this virus.  o Limit your contact with others.  o Wear a simple mask to cover your cough.  o Wash your hands well and often.    Resources    [facility]: About COVID-19: [email or web address]    CDC: What to Do If You're Sick: www.cdc.gov/coronavirus/2019-ncov/about/steps-when-sick.html    CDC: Ending Home Isolation: www.cdc.gov/coronavirus/2019-ncov/hcp/disposition-in-home-patients.html     CDC: Caring for Someone: www.cdc.gov/coronavirus/2019-ncov/if-you-are-sick/care-for-someone.html     [facility]: Interim Guidance for Hospital Discharge to Home: www.health.[facility].[place].us/diseases/coronavirus/hcp/hospdischarge.pdf    [facility] clinical trials (COVID-19 research studies): [email or web address]-clinical-trials     Below are the COVID-19 hotlines at the [facility] ([facility]). Interpreters are available.   o For health questions: Call 668-520-7566 or 1-213.124.6758 (7 a.m. to 7 p.m.)  o For questions about schools and childcare: Call 844-920-4010 or 1-884.305.7271 (7 a.m. to 7 p.m.)                   Rama Rubio RN on 9/24/2021 at 3:15 PM

## 2021-09-25 ENCOUNTER — HEALTH MAINTENANCE LETTER (OUTPATIENT)
Age: 31
End: 2021-09-25

## 2021-09-30 ENCOUNTER — OFFICE VISIT (OUTPATIENT)
Dept: FAMILY MEDICINE | Facility: CLINIC | Age: 31
End: 2021-09-30
Payer: COMMERCIAL

## 2021-09-30 VITALS
WEIGHT: 293 LBS | RESPIRATION RATE: 16 BRPM | TEMPERATURE: 97.8 F | DIASTOLIC BLOOD PRESSURE: 70 MMHG | OXYGEN SATURATION: 96 % | BODY MASS INDEX: 55.79 KG/M2 | HEART RATE: 80 BPM | SYSTOLIC BLOOD PRESSURE: 110 MMHG

## 2021-09-30 DIAGNOSIS — F90.9 ATTENTION DEFICIT HYPERACTIVITY DISORDER (ADHD), UNSPECIFIED ADHD TYPE: Primary | ICD-10-CM

## 2021-09-30 DIAGNOSIS — E66.01 MORBID OBESITY (H): ICD-10-CM

## 2021-09-30 DIAGNOSIS — H69.93 DYSFUNCTION OF BOTH EUSTACHIAN TUBES: ICD-10-CM

## 2021-09-30 DIAGNOSIS — J20.9 ACUTE BRONCHITIS, UNSPECIFIED ORGANISM: ICD-10-CM

## 2021-09-30 DIAGNOSIS — H65.93 MIDDLE EAR EFFUSION, BILATERAL: ICD-10-CM

## 2021-09-30 PROCEDURE — 90471 IMMUNIZATION ADMIN: CPT | Performed by: PHYSICIAN ASSISTANT

## 2021-09-30 PROCEDURE — 90686 IIV4 VACC NO PRSV 0.5 ML IM: CPT | Performed by: PHYSICIAN ASSISTANT

## 2021-09-30 PROCEDURE — 99214 OFFICE O/P EST MOD 30 MIN: CPT | Mod: 25 | Performed by: PHYSICIAN ASSISTANT

## 2021-09-30 RX ORDER — LISDEXAMFETAMINE DIMESYLATE 50 MG/1
50 CAPSULE ORAL EVERY MORNING
Qty: 30 CAPSULE | Refills: 0 | Status: SHIPPED | OUTPATIENT
Start: 2021-09-30 | End: 2023-03-30

## 2021-09-30 RX ORDER — FLUTICASONE PROPIONATE 50 MCG
1 SPRAY, SUSPENSION (ML) NASAL DAILY
Qty: 16 G | Refills: 3 | Status: SHIPPED | OUTPATIENT
Start: 2021-09-30 | End: 2023-03-30

## 2021-09-30 RX ORDER — ALBUTEROL SULFATE 90 UG/1
1-2 AEROSOL, METERED RESPIRATORY (INHALATION) EVERY 6 HOURS
Qty: 8 G | Refills: 0 | Status: SHIPPED | OUTPATIENT
Start: 2021-09-30 | End: 2021-10-29

## 2021-09-30 ASSESSMENT — PATIENT HEALTH QUESTIONNAIRE - PHQ9
SUM OF ALL RESPONSES TO PHQ QUESTIONS 1-9: 1
SUM OF ALL RESPONSES TO PHQ QUESTIONS 1-9: 1
10. IF YOU CHECKED OFF ANY PROBLEMS, HOW DIFFICULT HAVE THESE PROBLEMS MADE IT FOR YOU TO DO YOUR WORK, TAKE CARE OF THINGS AT HOME, OR GET ALONG WITH OTHER PEOPLE: SOMEWHAT DIFFICULT

## 2021-09-30 ASSESSMENT — ANXIETY QUESTIONNAIRES
3. WORRYING TOO MUCH ABOUT DIFFERENT THINGS: SEVERAL DAYS
7. FEELING AFRAID AS IF SOMETHING AWFUL MIGHT HAPPEN: SEVERAL DAYS
1. FEELING NERVOUS, ANXIOUS, OR ON EDGE: SEVERAL DAYS
2. NOT BEING ABLE TO STOP OR CONTROL WORRYING: SEVERAL DAYS
GAD7 TOTAL SCORE: 5
8. IF YOU CHECKED OFF ANY PROBLEMS, HOW DIFFICULT HAVE THESE MADE IT FOR YOU TO DO YOUR WORK, TAKE CARE OF THINGS AT HOME, OR GET ALONG WITH OTHER PEOPLE?: SOMEWHAT DIFFICULT
4. TROUBLE RELAXING: NOT AT ALL
7. FEELING AFRAID AS IF SOMETHING AWFUL MIGHT HAPPEN: SEVERAL DAYS
GAD7 TOTAL SCORE: 5
GAD7 TOTAL SCORE: 5
6. BECOMING EASILY ANNOYED OR IRRITABLE: SEVERAL DAYS
5. BEING SO RESTLESS THAT IT IS HARD TO SIT STILL: NOT AT ALL

## 2021-09-30 NOTE — PROGRESS NOTES
Assessment & Plan   Problem List Items Addressed This Visit        Digestive    Morbid obesity (H)    Relevant Medications    lisdexamfetamine (VYVANSE) 50 MG capsule       Behavioral    ADHD (attention deficit hyperactivity disorder) - Primary    Relevant Medications    lisdexamfetamine (VYVANSE) 50 MG capsule      Other Visit Diagnoses     Dysfunction of both eustachian tubes        Relevant Medications    fluticasone (FLONASE) 50 MCG/ACT nasal spray    albuterol (PROAIR HFA/PROVENTIL HFA/VENTOLIN HFA) 108 (90 Base) MCG/ACT inhaler    Acute bronchitis, unspecified organism        Relevant Medications    albuterol (PROAIR HFA/PROVENTIL HFA/VENTOLIN HFA) 108 (90 Base) MCG/ACT inhaler    Middle ear effusion, bilateral             - Ok to refill Vyvanse  - for middle ear effusions - start with Flonase  - Asthma - ACT 19 today - patient feels it is well controlled.  Albuterol refill sent.                    Return in about 4 weeks (around 10/28/2021) for Preventive Care Visit with Pap.    Marely Wong PA-C  [facility]   Cheryle is a 31 year old who presents for the following health issues     HPI     Medication Followup of Vyvanse    Taking Medication as prescribed: yes    Side Effects:  None    Medication Helping Symptoms:  Yes    Pt also would like to have ears checked and cleaned     Family complains she cannot hear well and talks loud             Review of Systems         Objective    /70   Pulse 80   Temp 97.8  F (36.6  C)   Resp 16   Wt 147.4 kg (325 lb)   SpO2 96%   BMI 55.79 kg/m    Body mass index is 55.79 kg/m .  Physical Exam  Constitutional:       General: She is not in acute distress.     Appearance: She is well-developed. She is not diaphoretic.   HENT:      Head: Normocephalic.      Right Ear: Ear canal and external ear normal. A middle ear effusion is present.      Left Ear: Ear canal and external ear normal. A middle ear effusion is  present.      Nose: Nose normal.   Eyes:      Conjunctiva/sclera: Conjunctivae normal.   Pulmonary:      Effort: Pulmonary effort is normal.   Musculoskeletal:      Cervical back: Normal range of motion.   Neurological:      Mental Status: She is alert and oriented to person, place, and time.   Psychiatric:         Judgment: Judgment normal.

## 2021-10-01 ASSESSMENT — ANXIETY QUESTIONNAIRES: GAD7 TOTAL SCORE: 5

## 2021-10-01 ASSESSMENT — ASTHMA QUESTIONNAIRES: ACT_TOTALSCORE: 19

## 2021-10-28 DIAGNOSIS — J20.9 ACUTE BRONCHITIS, UNSPECIFIED ORGANISM: ICD-10-CM

## 2021-10-29 RX ORDER — ALBUTEROL SULFATE 90 UG/1
AEROSOL, METERED RESPIRATORY (INHALATION)
Qty: 6.7 G | Refills: 0 | Status: SHIPPED | OUTPATIENT
Start: 2021-10-29 | End: 2023-03-30

## 2021-11-15 ENCOUNTER — NURSE TRIAGE (OUTPATIENT)
Dept: NURSING | Facility: CLINIC | Age: 31
End: 2021-11-15
Payer: COMMERCIAL

## 2021-11-15 NOTE — TELEPHONE ENCOUNTER
Patient reports that she woke up today with severe right hip pain. She had lower back pain about 3 weeks ago but it went away, now is just currently in the hip. Pain is constant, patient rates pain at a 10. She denies any swelling, redness or fever. She did not have any injury that she is aware of. No urinary concerns. Patient is able to walk but is limping.    Go to office now per protocol. Patient verbalizes understanding and denies further questions. She is transferred to scheduling or will use urgent care as needed.    Halima FLANAGAN [facility] Nurse Advisors    Reason for Disposition    SEVERE pain (e.g., excruciating, unable to do any normal activities)    Additional Information    Negative: Looks like a broken bone or dislocated joint (e.g., crooked or deformed)    Negative: Sounds like a life-threatening emergency to the triager    Negative: Followed a hip injury    Negative: Leg pain is main symptom    Negative: Back pain radiating (shooting) into hip    Negative: SEVERE pain (e.g., excruciating, unable to do any normal activities) and fever    Negative: Can't stand (bear weight) or walk    Negative: Fever and red area (or area very tender to touch)    Negative: Patient sounds very sick or weak to the triager    Protocols used: HIP PAIN-A-OH  COVID 19 Nurse Triage Plan/Patient Instructions    Please be aware that novel coronavirus (COVID-19) may be circulating in the community. If you develop symptoms such as fever, cough, or SOB or if you have concerns about the presence of another infection including coronavirus (COVID-19), please contact your health care provider or visit [email or web address].     Disposition/Instructions    In-Person Visit with provider recommended. Reference Visit Selection Guide.    Thank you for taking steps to prevent the spread of this virus.  o Limit your contact with others.  o Wear a simple mask to cover your cough.  o Wash your hands well and  often.    Resources    [facility]: About COVID-19: [email or web address]    CDC: What to Do If You're Sick: www.cdc.gov/coronavirus/2019-ncov/about/steps-when-sick.html    CDC: Ending Home Isolation: www.cdc.gov/coronavirus/2019-ncov/hcp/disposition-in-home-patients.html     CDC: Caring for Someone: www.cdc.gov/coronavirus/2019-ncov/if-you-are-sick/care-for-someone.html     [facility]: Interim Guidance for Hospital Discharge to Home: www.health.[facility].[place]./diseases/coronavirus/hcp/hospdischarge.pdf    [facility] clinical trials (COVID-19 research studies): [email or web address].[facility]-clinical-trials     Below are the COVID-19 hotlines at the [place] [facility] ([facility]). Interpreters are available.   o For health questions: Call 672-622-6688 or 1-322.950.2275 (7 a.m. to 7 p.m.)  o For questions about schools and childcare: Call 320-402-3611 or 1-652.459.9768 (7 a.m. to 7 p.m.)

## 2021-11-16 ENCOUNTER — ANCILLARY PROCEDURE (OUTPATIENT)
Dept: GENERAL RADIOLOGY | Facility: CLINIC | Age: 31
End: 2021-11-16
Attending: PHYSICIAN ASSISTANT
Payer: COMMERCIAL

## 2021-11-16 ENCOUNTER — OFFICE VISIT (OUTPATIENT)
Dept: URGENT CARE | Facility: URGENT CARE | Age: 31
End: 2021-11-16
Payer: COMMERCIAL

## 2021-11-16 VITALS
DIASTOLIC BLOOD PRESSURE: 83 MMHG | WEIGHT: 293 LBS | BODY MASS INDEX: 57.23 KG/M2 | HEART RATE: 89 BPM | OXYGEN SATURATION: 96 % | SYSTOLIC BLOOD PRESSURE: 132 MMHG

## 2021-11-16 DIAGNOSIS — M25.551 HIP PAIN, RIGHT: ICD-10-CM

## 2021-11-16 DIAGNOSIS — M79.604 LEG PAIN, POSTERIOR, RIGHT: ICD-10-CM

## 2021-11-16 DIAGNOSIS — E66.01 MORBID OBESITY (H): Primary | ICD-10-CM

## 2021-11-16 LAB — D DIMER PPP FEU-MCNC: <0.27 UG/ML FEU (ref 0–0.5)

## 2021-11-16 PROCEDURE — 85379 FIBRIN DEGRADATION QUANT: CPT | Performed by: PHYSICIAN ASSISTANT

## 2021-11-16 PROCEDURE — 36415 COLL VENOUS BLD VENIPUNCTURE: CPT | Performed by: PHYSICIAN ASSISTANT

## 2021-11-16 PROCEDURE — 73502 X-RAY EXAM HIP UNI 2-3 VIEWS: CPT | Mod: RT | Performed by: RADIOLOGY

## 2021-11-16 PROCEDURE — 99215 OFFICE O/P EST HI 40 MIN: CPT | Performed by: PHYSICIAN ASSISTANT

## 2021-11-16 RX ORDER — HYDROCODONE BITARTRATE AND ACETAMINOPHEN 5; 325 MG/1; MG/1
1 TABLET ORAL EVERY 6 HOURS PRN
Qty: 15 TABLET | Refills: 0 | Status: SHIPPED | OUTPATIENT
Start: 2021-11-16 | End: 2021-11-19

## 2021-11-16 NOTE — PROGRESS NOTES
"  Assessment & Plan     Morbid obesity (H)  Contributing to hip pain  Follow up with PCP    Leg pain, posterior, right  D-dimer negative for DVT  - D dimer quantitative  - HYDROcodone-acetaminophen (NORCO) 5-325 MG tablet; Take 1 tablet by mouth every 6 hours as needed for pain    Hip pain, right  Xray hip Negative for acute findings, read by Rolf Ko PA-C [facility] at time of visit.  RICE treatment: Rest, Ice, compression, elevation   OTC motrin  Ice  vicodin for pain  Referral to orhto  - XR Hip Right 2-3 Views; Future  - Orthopedic  Referral; Future  - HYDROcodone-acetaminophen (NORCO) 5-325 MG tablet; Take 1 tablet by mouth every 6 hours as needed for pain    Review of external notes as documented elsewhere in note  40 minutes spent on the date of the encounter doing chart review, review of outside records, review of test results, interpretation of tests, patient visit and documentation        Tobacco Cessation:   reports that she has been smoking cigarettes. She has a 0.13 pack-year smoking history. She has quit using smokeless tobacco.      BMI:   Estimated body mass index is 57.23 kg/m  as calculated from the following:    Height as of 7/20/21: 1.626 m (5' 4\").    Weight as of this encounter: 151.2 kg (333 lb 6.4 oz).   Weight management plan: Patient was referred to their PCP to discuss a diet and exercise plan.    CONSULTATION/REFERRAL to [facility]    No follow-ups on file.    Rolf Ko PA-C  [facility] LAWRENCE Vazquez is a 31 year old who presents for the following health issues     HPI     Right hip pain  Posterior right leg pain    Review of Systems   Constitutional, HEENT, cardiovascular, pulmonary, gi and gu systems are negative, except as otherwise noted.      Objective    /83 (BP Location: Right arm, Patient Position: Sitting, Cuff Size: Adult Large)   Pulse 89   Wt (!) 151.2 kg (333 lb 6.4 oz)   SpO2 96%   BMI 57.23 kg/m    Body mass index is " 57.23 kg/m .  Physical Exam   GENERAL: healthy, alert and no distress  MS: Positive for right hip pain and tenderness posterior leg  SKIN: no suspicious lesions or rashes  NEURO: Normal strength and tone, mentation intact and speech normal  PSYCH: mentation appears normal, affect normal/bright  LYMPH: no cervical, supraclavicular, axillary, or inguinal adenopathy    Results for orders placed or performed in visit on 11/16/21   XR Hip Right 2-3 Views     Status: None    Narrative    XR HIP RIGHT 2-3 VIEWS 11/16/2021 10:53 AM     HISTORY: Hip pain, right    COMPARISON: None.      Impression    IMPRESSION: No fractures are evident. No joint space narrowing.  Degenerative changes in the right SI joint. IUD.    NILAY SIMON MD         SYSTEM ID:  [id number]   Results for orders placed or performed in visit on 11/16/21   D dimer quantitative     Status: Normal   Result Value Ref Range    D-Dimer Quantitative <0.27 0.00 - 0.50 ug/mL FEU    Narrative    This D-dimer assay is intended for use in conjunction with a clinical pretest probability assessment model to exclude pulmonary embolism (PE) and deep venous thrombosis (DVT) in outpatients suspected of PE or DVT. The cut-off value is 0.50 ug/mL FEU.

## 2021-11-18 ENCOUNTER — OFFICE VISIT (OUTPATIENT)
Dept: FAMILY MEDICINE | Facility: CLINIC | Age: 31
End: 2021-11-18
Payer: COMMERCIAL

## 2021-11-18 DIAGNOSIS — M54.41 ACUTE BILATERAL LOW BACK PAIN WITH RIGHT-SIDED SCIATICA: Primary | ICD-10-CM

## 2021-11-18 PROCEDURE — 99213 OFFICE O/P EST LOW 20 MIN: CPT | Performed by: PHYSICIAN ASSISTANT

## 2021-11-18 RX ORDER — METHYLPREDNISOLONE 4 MG
TABLET, DOSE PACK ORAL
Qty: 21 TABLET | Refills: 0 | Status: SHIPPED | OUTPATIENT
Start: 2021-11-18 | End: 2021-12-31

## 2021-11-18 ASSESSMENT — PATIENT HEALTH QUESTIONNAIRE - PHQ9
SUM OF ALL RESPONSES TO PHQ QUESTIONS 1-9: 0
10. IF YOU CHECKED OFF ANY PROBLEMS, HOW DIFFICULT HAVE THESE PROBLEMS MADE IT FOR YOU TO DO YOUR WORK, TAKE CARE OF THINGS AT HOME, OR GET ALONG WITH OTHER PEOPLE: NOT DIFFICULT AT ALL
SUM OF ALL RESPONSES TO PHQ QUESTIONS 1-9: 0

## 2021-11-18 ASSESSMENT — ANXIETY QUESTIONNAIRES
2. NOT BEING ABLE TO STOP OR CONTROL WORRYING: NOT AT ALL
4. TROUBLE RELAXING: NOT AT ALL
3. WORRYING TOO MUCH ABOUT DIFFERENT THINGS: SEVERAL DAYS
GAD7 TOTAL SCORE: 4
8. IF YOU CHECKED OFF ANY PROBLEMS, HOW DIFFICULT HAVE THESE MADE IT FOR YOU TO DO YOUR WORK, TAKE CARE OF THINGS AT HOME, OR GET ALONG WITH OTHER PEOPLE?: NOT DIFFICULT AT ALL
GAD7 TOTAL SCORE: 4
GAD7 TOTAL SCORE: 4
7. FEELING AFRAID AS IF SOMETHING AWFUL MIGHT HAPPEN: SEVERAL DAYS
7. FEELING AFRAID AS IF SOMETHING AWFUL MIGHT HAPPEN: SEVERAL DAYS
6. BECOMING EASILY ANNOYED OR IRRITABLE: SEVERAL DAYS
5. BEING SO RESTLESS THAT IT IS HARD TO SIT STILL: NOT AT ALL
1. FEELING NERVOUS, ANXIOUS, OR ON EDGE: SEVERAL DAYS

## 2021-11-18 NOTE — PROGRESS NOTES
Assessment & Plan   Problem List Items Addressed This Visit     None      Visit Diagnoses     Acute bilateral low back pain with right-sided sciatica    -  Primary    Relevant Medications    methylPREDNISolone (MEDROL DOSEPAK) 4 MG tablet therapy pack    Other Relevant Orders    [facility] Referral         - PT and weight loss are important for treatment and prevention.  - Medrol dose pack for inflammation and ongoing pain  - Recheck in 4 weeks if not improving - at that time I would consider a lumber spine MRI                   Return in about 4 weeks (around 12/16/2021) for a recheck if you are not improved.    Marely Wong PA-C  [facility] REGINA Vazquez is a 31 year old who presents for the following health issues     History of Present Illness       Back Pain:  She presents for follow up of back pain. Patient's back pain is a recurring problem.  Location of back pain:  Right lower back, right buttock, right hip and right side of waist  Description of back pain: cramping, dull ache, fullness, gnawing, sharp, shooting and stabbing  Back pain spreads: right buttocks, right thigh, right knee and right foot    Since patient first noticed back pain, pain is: unchanged  Does back pain interfere with her job:  Yes      She eats 0-1 servings of fruits and vegetables daily.She consumes 1 sweetened beverage(s) daily.She exercises with enough effort to increase her heart rate 9 or less minutes per day.  She exercises with enough effort to increase her heart rate 3 or less days per week.   She is taking medications regularly.       Concern - Weight problems         Pain History:  When did you first notice your pain? - 1 to 6 weeks   Have you seen any provider previously for this issue? No- was seen at [facility] 2 days ago   How has your pain affected your ability to work? Not currently working - unrelated to pain  Where in your body do you have pain? Back Pain -  goes down the right leg  Onset/Duration: ongoing for 4 weeks   Description:   Location of pain: low back right  Character of pain: sharp, dull ache and stabbing  Pain radiation: radiates into the right buttocks, radiates into the right leg and radiates into the right foot  New numbness or weakness in legs, not attributed to pain: YES- mild   Intensity:moderate, severe  Progression of Symptoms: constant  History:   Specific cause: none  Pain interferes with job: not applicable  History of back problems: recurrent self limited episodes of low back pain in the past  Any previous MRI or X-rays: Xray of the hip x 2 days ago   Sees a specialist for back pain: No  Alleviating factors:   Improved by: heat, NSAIDs and opioids    Precipitating factors:  Worsened by: Walking  Therapies tried and outcome: cold, heat, NSAIDs and opioids      4 weeks ago - woke her up from sleep  Hard to move or breathe  Sharp pain  Positional pain  Ibuprofen helps  25-30% improvement     Red flag symptoms:  Denies saddle anesthesia, bowel and bladder dysfunction.    Denies fever and chills.    Denies recent IV drug use.    Denies recent weight loss or history of cancer.   Denies history of osteoporosis or prolonged steroid use.            Review of Systems         Objective    There were no vitals taken for this visit.  There is no height or weight on file to calculate BMI.  Physical Exam  Constitutional:       General: She is not in acute distress.     Appearance: She is well-developed. She is not diaphoretic.   HENT:      Head: Normocephalic.      Right Ear: External ear normal.      Left Ear: External ear normal.      Nose: Nose normal.   Eyes:      Conjunctiva/sclera: Conjunctivae normal.   Pulmonary:      Effort: Pulmonary effort is normal.   Musculoskeletal:      Cervical back: Normal range of motion.      Lumbar back: Spasms and tenderness present. No bony tenderness. Positive right straight leg raise test. Negative left straight leg  raise test.   Skin:     General: Skin is warm and dry.   Neurological:      Mental Status: She is alert and oriented to person, place, and time.      Sensory: Sensation is intact.      Motor: Motor function is intact.      Coordination: Coordination is intact.      Gait: Gait is intact.   Psychiatric:         Judgment: Judgment normal.                      Answers for HPI/ROS submitted by the patient on 11/18/2021  If you checked off any problems, how difficult have these problems made it for you to do your work, take care of things at home, or get along with other people?: Not difficult at all  PHQ9 TOTAL SCORE: 0  AAMIR 7 TOTAL SCORE: 4

## 2021-11-19 ENCOUNTER — OFFICE VISIT (OUTPATIENT)
Dept: NEUROSURGERY | Facility: CLINIC | Age: 31
End: 2021-11-19
Attending: PHYSICIAN ASSISTANT
Payer: COMMERCIAL

## 2021-11-19 VITALS
SYSTOLIC BLOOD PRESSURE: 134 MMHG | WEIGHT: 293 LBS | HEART RATE: 73 BPM | DIASTOLIC BLOOD PRESSURE: 82 MMHG | HEIGHT: 64 IN | OXYGEN SATURATION: 91 % | BODY MASS INDEX: 50.02 KG/M2

## 2021-11-19 DIAGNOSIS — M54.16 LUMBAR RADICULOPATHY: Primary | ICD-10-CM

## 2021-11-19 PROCEDURE — G0463 HOSPITAL OUTPT CLINIC VISIT: HCPCS

## 2021-11-19 PROCEDURE — 99244 OFF/OP CNSLTJ NEW/EST MOD 40: CPT | Performed by: NURSE PRACTITIONER

## 2021-11-19 ASSESSMENT — MIFFLIN-ST. JEOR: SCORE: 2210.48

## 2021-11-19 ASSESSMENT — ANXIETY QUESTIONNAIRES: GAD7 TOTAL SCORE: 4

## 2021-11-19 ASSESSMENT — PATIENT HEALTH QUESTIONNAIRE - PHQ9: SUM OF ALL RESPONSES TO PHQ QUESTIONS 1-9: 0

## 2021-11-19 NOTE — NURSING NOTE
"Cheryle Vazquez is a 31 year old female who presents for:  Chief Complaint   Patient presents with     Consult     Right hip pain; believed to be from lower back pain, with right sided sciatica        Initial Vitals:  /82   Pulse 73   Ht 5' 4\" (1.626 m)   Wt (!) 333 lb (151 kg)   SpO2 91%   BMI 57.16 kg/m   Estimated body mass index is 57.16 kg/m  as calculated from the following:    Height as of this encounter: 5' 4\" (1.626 m).    Weight as of this encounter: 333 lb (151 kg).. Body surface area is 2.61 meters squared. BP completed using cuff size: regular  Data Unavailable    Terry Dougherty MA    "

## 2021-11-19 NOTE — PROGRESS NOTES
Dr. Andrzej FLANAGAN [facility] Neurosurgery Clinic Visit      CC: back pain, right leg pain     Primary care Provider: Marely Wong    Reason For Visit:   I was asked by Rolf Ko PA-C to consult on the patient for: right hip pain      HPI: Cheryle Vazquez is a 31 year old female who presents for evaluation of acute right sided low back pain that radiates to right hip, posterior/lateral right thigh, and calf. She reports intermittent paresthesias and feels some weakness in her right leg as well. Symptoms have been present for about 1 month. She has been evaluated by Urgent Care and PCP. Describes the pain as shooting, aching, and throbbing. Pain is worsened with walking, bending, twisting, and lifting. She has been doing home exercises/stretching, scheduled to start formal PT next week. She was given Medrol Dosepak and Norco for pain control. She also takes NSAIDS as needed. Denies any falls, foot drop, saddle anesthesia, or bladder/bowel incontinence.      Current pain: 8/10   At worst: 10/10    Past Medical History:   Diagnosis Date     Acid reflux      ADHD (attention deficit hyperactivity disorder)      Depressive disorder      Eczema        Past Medical History reviewed with patient during visit.    Past Surgical History:   Procedure Laterality Date     LIGATE ARTERY ETHMOID  2008     Past Surgical History reviewed with patient during visit.    Current Outpatient Medications   Medication     albuterol (PROAIR HFA/PROVENTIL HFA/VENTOLIN HFA) 108 (90 Base) MCG/ACT inhaler     albuterol (PROVENTIL) (2.5 MG/3ML) 0.083% neb solution     citalopram (CELEXA) 20 MG tablet     fluticasone (FLONASE) 50 MCG/ACT nasal spray     HYDROcodone-acetaminophen (NORCO) 5-325 MG tablet     lisdexamfetamine (VYVANSE) 50 MG capsule     methylPREDNISolone (MEDROL DOSEPAK) 4 MG tablet therapy pack     No current facility-administered medications for this visit.       Allergies   Allergen Reactions      "Amoxicillin Other (See Comments)     Caused yeast infection  Patient does not want to take Amoxicillin as it causes a yeast infection.  She has requested it be added to her allergy list.     Azithromycin Rash     Sulfamethoxazole-Trimethoprim Palpitations and Rash     Possible allergic reaction     Valacyclovir Palpitations and Rash     Possible allergic reaction       Social History     Socioeconomic History     Marital status:      Spouse name: Not on file     Number of children: Not on file     Years of education: Not on file     Highest education level: Not on file   Occupational History     Not on file   Tobacco Use     Smoking status: Current Some Day Smoker     Packs/day: 0.25     Years: 0.50     Pack years: 0.12     Types: Cigarettes     Smokeless tobacco: Former User     Tobacco comment: Trying to stop---Hasn't smoked for a week.   Substance and Sexual Activity     Alcohol use: Yes     Comment: minimal     Drug use: Yes     Frequency: 3.0 times per week     Types: Marijuana     Sexual activity: Not Currently   Other Topics Concern     Parent/sibling w/ CABG, MI or angioplasty before 65F 55M? Not Asked   Social History Narrative     Not on file     Social Determinants of Health     Financial Resource Strain: Not on file   Food Insecurity: Not on file   Transportation Needs: Not on file   Physical Activity: Not on file   Stress: Not on file   Social Connections: Not on file   Intimate Partner Violence: Not on file   Housing Stability: Not on file       Family History   Problem Relation Age of Onset     Hypertension Paternal Grandmother      Asthma Mother      No Known Problems Father        ROS: 10 point ROS neg other than the symptoms noted above in the HPI.    Vital Signs: /82   Pulse 73   Ht 5' 4\" (1.626 m)   Wt (!) 333 lb (151 kg)   SpO2 91%   BMI 57.16 kg/m      Examination:  Awake  Alert  Oriented x 3  Speech clear  Cranial nerves II - XII grossly intact  PERRL  EOMI  Face " symmetric  Motor exam   Hip Flexor:                 Right: 5/5  Left:  5/5  Quadriceps:               Right:  5/5  Left:  5/5  Hamstrings:               Right:  5/5  Left:  5/5  Gastroc Soleus:         Right:  5/5  Left:  5/5  Tib/Ant:                      Right:  5/5  Left:  5/5  EHL:                          Right:  5/5  Left:  5/5         Sensation normal to bilateral lower extremities.    Reflexes are 2+ in the patellar and Achilles. There is no clonus.     Musculoskeletal:  Gait: Able to stand from a seated position. Normal non-antalgic, non-myelopathic gait.     Lumbar examination reveals no tenderness of the spine or paraspinous muscles.  Tenderness to palpation of right SI joint.   Straight leg raise is negative bilaterally.      Imaging:   No imaging has been completed of the lumbar spine.    XR HIP RIGHT 2-3 VIEWS 11/16/2021 10:53 AM   IMPRESSION: No fractures are evident. No joint space narrowing.  Degenerative changes in the right SI joint. IUD.    Assessment/Plan:   31 year old female who presents for evaluation of acute right sided low back pain that radiates to right hip, posterior/lateral right thigh, and calf. She reports intermittent paresthesias and feels some weakness in her right leg as well. Pain is worsened with walking, bending, twisting, and lifting. She has been doing home exercises/stretching, scheduled to start formal PT next week. Currently taking Norco, NSAIDS, and Medrol Dosepak for pain control. Right hip XR reviewed with patient and we discussed options at this time. Recommend a lumbar spine MRI for further evaluation. I will call with the results and next steps.    Advised patient to call our clinic with any questions or concerns. Discussed red flag symptoms and advised to seek medical attention if these develop. Patient voiced understanding and agreement.      Dimple Parmar [facility] Neurosurgery  [address]  [address]  [place] [address]  Tel [phone]  Pager [phone]

## 2021-11-19 NOTE — LETTER
11/19/2021         RE: Cheryle Vazquez  [address]  [place] [address]        Dear Colleague,    Thank you for referring your patient, Cheryle Vazquez, to the [facility] [place]. Please see a copy of my visit note below.    Dr. Andrzej Hwang  [facility] Neurosurgery Clinic Visit      CC: back pain, right leg pain     Primary care Provider: Marely Wong    Reason For Visit:   I was asked by Rolf Ko PA-C to consult on the patient for: right hip pain      HPI: Cheryle Vazquez is a 31 year old female who presents for evaluation of acute right sided low back pain that radiates to right hip, posterior/lateral right thigh, and calf. She reports intermittent paresthesias and feels some weakness in her right leg as well. Symptoms have been present for about 1 month. She has been evaluated by Urgent Care and PCP. Describes the pain as shooting, aching, and throbbing. Pain is worsened with walking, bending, twisting, and lifting. She has been doing home exercises/stretching, scheduled to start formal PT next week. She was given Medrol Dosepak and Norco for pain control. She also takes NSAIDS as needed. Denies any falls, foot drop, saddle anesthesia, or bladder/bowel incontinence.      Current pain: 8/10   At worst: 10/10    Past Medical History:   Diagnosis Date     Acid reflux      ADHD (attention deficit hyperactivity disorder)      Depressive disorder      Eczema        Past Medical History reviewed with patient during visit.    Past Surgical History:   Procedure Laterality Date     LIGATE ARTERY ETHMOID  2008     Past Surgical History reviewed with patient during visit.    Current Outpatient Medications   Medication     albuterol (PROAIR HFA/PROVENTIL HFA/VENTOLIN HFA) 108 (90 Base) MCG/ACT inhaler     albuterol (PROVENTIL) (2.5 MG/3ML) 0.083% neb solution     citalopram (CELEXA) 20 MG tablet     fluticasone (FLONASE) 50 MCG/ACT nasal spray      HYDROcodone-acetaminophen (NORCO) 5-325 MG tablet     lisdexamfetamine (VYVANSE) 50 MG capsule     methylPREDNISolone (MEDROL DOSEPAK) 4 MG tablet therapy pack     No current facility-administered medications for this visit.       Allergies   Allergen Reactions     Amoxicillin Other (See Comments)     Caused yeast infection  Patient does not want to take Amoxicillin as it causes a yeast infection.  She has requested it be added to her allergy list.     Azithromycin Rash     Sulfamethoxazole-Trimethoprim Palpitations and Rash     Possible allergic reaction     Valacyclovir Palpitations and Rash     Possible allergic reaction       Social History     Socioeconomic History     Marital status:      Spouse name: Not on file     Number of children: Not on file     Years of education: Not on file     Highest education level: Not on file   Occupational History     Not on file   Tobacco Use     Smoking status: Current Some Day Smoker     Packs/day: 0.25     Years: 0.50     Pack years: 0.12     Types: Cigarettes     Smokeless tobacco: Former User     Tobacco comment: Trying to stop---Hasn't smoked for a week.   Substance and Sexual Activity     Alcohol use: Yes     Comment: minimal     Drug use: Yes     Frequency: 3.0 times per week     Types: Marijuana     Sexual activity: Not Currently   Other Topics Concern     Parent/sibling w/ CABG, MI or angioplasty before 65F 55M? Not Asked   Social History Narrative     Not on file     Social Determinants of Health     Financial Resource Strain: Not on file   Food Insecurity: Not on file   Transportation Needs: Not on file   Physical Activity: Not on file   Stress: Not on file   Social Connections: Not on file   Intimate Partner Violence: Not on file   Housing Stability: Not on file       Family History   Problem Relation Age of Onset     Hypertension Paternal Grandmother      Asthma Mother      No Known Problems Father        ROS: 10 point ROS neg other than the symptoms  "noted above in the HPI.    Vital Signs: /82   Pulse 73   Ht 5' 4\" (1.626 m)   Wt (!) 333 lb (151 kg)   SpO2 91%   BMI 57.16 kg/m      Examination:  Awake  Alert  Oriented x 3  Speech clear  Cranial nerves II - XII grossly intact  PERRL  EOMI  Face symmetric  Motor exam   Hip Flexor:                 Right: 5/5  Left:  5/5  Quadriceps:               Right:  5/5  Left:  5/5  Hamstrings:               Right:  5/5  Left:  5/5  Gastroc Soleus:         Right:  5/5  Left:  5/5  Tib/Ant:                      Right:  5/5  Left:  5/5  EHL:                          Right:  5/5  Left:  5/5         Sensation normal to bilateral lower extremities.    Reflexes are 2+ in the patellar and Achilles. There is no clonus.     Musculoskeletal:  Gait: Able to stand from a seated position. Normal non-antalgic, non-myelopathic gait.     Lumbar examination reveals no tenderness of the spine or paraspinous muscles.  Tenderness to palpation of right SI joint.   Straight leg raise is negative bilaterally.      Imaging:   No imaging has been completed of the lumbar spine.    XR HIP RIGHT 2-3 VIEWS 11/16/2021 10:53 AM   IMPRESSION: No fractures are evident. No joint space narrowing.  Degenerative changes in the right SI joint. IUD.    Assessment/Plan:   31 year old female who presents for evaluation of acute right sided low back pain that radiates to right hip, posterior/lateral right thigh, and calf. She reports intermittent paresthesias and feels some weakness in her right leg as well. Pain is worsened with walking, bending, twisting, and lifting. She has been doing home exercises/stretching, scheduled to start formal PT next week. Currently taking Norco, NSAIDS, and Medrol Dosepak for pain control. Right hip XR reviewed with patient and we discussed options at this time. Recommend a lumbar spine MRI for further evaluation. I will call with the results and next steps.    Advised patient to call our clinic with any questions or " concerns. Discussed red flag symptoms and advised to seek medical attention if these develop. Patient voiced understanding and agreement.      Dimple Parmar CNP  [facility] Neurosurgery  [address] [address]  Tel [phone]  Pager [phone]          Again, thank you for allowing me to participate in the care of your patient.        Sincerely,        Dimple Parmar, NP

## 2021-11-19 NOTE — PATIENT INSTRUCTIONS
Order for lumbar spine MRI. You can stop at the  to schedule, or call [phone]. I will call with the results and next steps.     Please call our clinic if symptoms persist, change, or worsen at any time.    Dimple Parmar CNP  [facility] Neurosurgery  [address] [address]  Tel [phone]

## 2021-11-22 ENCOUNTER — THERAPY VISIT (OUTPATIENT)
Dept: PHYSICAL THERAPY | Facility: CLINIC | Age: 31
End: 2021-11-22
Attending: PHYSICIAN ASSISTANT
Payer: COMMERCIAL

## 2021-11-22 DIAGNOSIS — M54.16 LUMBAR RADICULOPATHY: ICD-10-CM

## 2021-11-22 DIAGNOSIS — M54.41 ACUTE BILATERAL LOW BACK PAIN WITH RIGHT-SIDED SCIATICA: ICD-10-CM

## 2021-11-22 PROCEDURE — 97110 THERAPEUTIC EXERCISES: CPT | Mod: GP | Performed by: PHYSICAL THERAPIST

## 2021-11-22 PROCEDURE — 97162 PT EVAL MOD COMPLEX 30 MIN: CPT | Mod: GP | Performed by: PHYSICAL THERAPIST

## 2021-11-22 PROCEDURE — 97112 NEUROMUSCULAR REEDUCATION: CPT | Mod: GP | Performed by: PHYSICAL THERAPIST

## 2021-11-22 NOTE — PROGRESS NOTES
[facility] Initial Evaluation -- Lumbar    Date: November 22, 2021  Cheryle Vazquez is a 31 year old female with a lumbar condition.   Referral: Dr. Garcia mechanical stresses:  none  Employment status:  none  Leisure mechanical stresses: 80% sitting,   Functional disability score (CONSUELO/STarT Back):  See flow sheet  VAS score (0-10): 7-9/10  Patient goals/expectations:  To get pain relief    HISTORY:    Present symptoms: R low back, radiates into R thigh, lower leg, ankle  Pain quality (sharp/shooting/stabbing/aching/burning/cramping):  Aching, sharp, burning   Paresthesia (yes/no):  No numbness, no tingling    Present since (onset date): November 2021.     Symptoms (improving/unchanging/worsening):  Improving (still on steroid).     Symptoms commenced as a result of: no apparent reason   Condition occurred in the following environment:   home     Symptoms at onset (back/thigh/leg): back, onset of leg symptoms two weeks later  Constant symptoms (back/thigh/leg): R post thigh, buttocks, right low back  Intermittent symptoms (back/thigh/leg): lower leg    Symptoms are made worse with the following: Sometimes Sitting, Always Standing, Always Walking, Time of day - No effect and Always On the move   Symptoms are made better with the following: Sometimes Sitting, Sometimes Lying and Other - ice, heat, medrol pack    Disturbed sleep (yes/no):  Initially waking up x2; currently sleeping better due to meds Sleeping postures (prone/sup/side R/L): sides    Previous episodes (0/1-5/6-10/11+): 2 Year of first episode: within last 5 years    Previous history: episodic  Previous treatments: self manged      Specific Questions:  Cough/Sneeze/Strain (pos/neg): positive   Bowel/Bladder (normal/abnormal): normal  Gait (normal/abnormal): abnormal  Medications (nil/NSAIDS/analg/steroids/anticoag/other):  None, Narcotics/Opiods and Steroids  Medical allergies:  See chart  General health  (excellent/good/fair/poor):  fair  Pertinent medical history:  Depression, Overweight and Smoking  Imaging (None/Xray/MRI/Other):  xrays  Recent or major surgery (yes/no):  no  Night pain (yes/no): no  Accidents (yes/no): no  Unexplained weight loss (yes/no): no  Barriers at home: none  Other red flags: none    EXAMINATION    Posture:   Sitting (good/fair/poor): fair  Standing (good/fair/poor):fair  Lordosis (red/acc/normal): red  Correction of posture (better/worse/no effect): slightly better.     Lateral Shift (right/left/nil): ? R   Relevant (yes/no):  ?  Other Observations: none    Neurological:    Motor deficit:  intact  Reflexes:  Not tested  Sensory deficit:  intact  Dural signs:  +Slump R    Movement Loss:   Peter Mod Min Nil Pain   Flexion    x pain   Extension  x   Pain R buttocks   Side Gliding R   x  pdm   Side Gliding L   x x      Test Movements:   During: produces, abolishes, increases, decreases, no effect, centralizing, peripheralizing   After: better, worse, no better, no worse, no effect, centralized, peripheralized    Pretest symptoms standing:    Symptoms During Symptoms After ROM increased ROM decreased No Effect   FIS        Rep FIS        EIS        Rep EIS          Pretest symptoms lying:     Symptoms During Symptoms After ROM increased ROM decreased No Effect   BRIONNA        Rep BRIONNA        EIL        Rep EIL          If required, pretest symptoms:    Symptoms During Symptoms After ROM increased ROM decreased No Effect   SGIS - R        Rep SGIS - R        SGIS - L        Rep SGIS - L          Static Tests:  Sitting slouched:       Sitting erect:    Standing slouched:     Standing erect:    Lying prone in extension:   Prone lying to UTE: decr R LE, centralizing/better/incr ROM, improved Slump R  Long sitting:      Other Tests:     Provisional Classification:  Derangement - Asymmetrical, unilateral, symptoms below knee    Principle of Management:  Education:  Centralization, therapeutic dose of  exercise, posture   Equipment provided:  Lumbar roll  Mechanical therapy (Y/N):  Y   Extension principle:  Sustained prone lying to UTE 5-8 minutes 4-5 x day  Lateral Principle:    Flexion principle:    Other:      ASSESSMENT/PLAN:    Patient is a 31 year old female with lumbar complaints.    Patient has the following significant findings with corresponding treatment plan.                Diagnosis 1:  Lumbar radiculopathy  Pain -  manual therapy, self management, education, directional preference exercise and home program  Decreased ROM/flexibility - manual therapy and therapeutic exercise  Decreased function - therapeutic activities  Impaired posture - neuro re-education    Therapy Evaluation Codes:   1) History comprised of:   Personal factors that impact the plan of care:      Anxiety.    Comorbidity factors that impact the plan of care are:      Depression and Overweight.     Medications impacting care: Anti-depressant, Anti-inflammatory, Pain and Steroids.  2) Examination of Body Systems comprised of:   Body structures and functions that impact the plan of care:      Lumbar spine.   Activity limitations that impact the plan of care are:      Sitting, Standing and Walking.  3) Clinical presentation characteristics are:   Evolving/Changing.  4) Decision-Making    Moderate complexity using standardized patient assessment instrument and/or measureable assessment of functional outcome.  Cumulative Therapy Evaluation is: Moderate complexity.    Previous and current functional limitations:  (See Goal Flow Sheet for this information)    Short term and Long term goals: (See Goal Flow Sheet for this information)     Communication ability:  Patient appears to be able to clearly communicate and understand verbal and written communication and follow directions correctly.  Treatment Explanation - The following has been discussed with the patient:   RX ordered/plan of care  Anticipated outcomes  Possible risks and side  effects  This patient would benefit from PT intervention to resume normal activities.   Rehab potential is good.    Frequency:  1 X week, once daily  Duration:  for 6 weeks  Discharge Plan:  Achieve all LTG.  Independent in home treatment program.  Reach maximal therapeutic benefit.    Please refer to the daily flowsheet for treatment today, total treatment time and time spent performing 1:1 timed codes.

## 2021-11-22 NOTE — PROGRESS NOTES
[facility]    OUTPATIENT Physical Therapy ORTHOPEDIC EVALUATION  PLAN OF TREATMENT FOR OUTPATIENT REHABILITATION  (COMPLETE FOR INITIAL CLAIMS ONLY)  Patient's Last Name, First Name, M.I.  YOB: 1990  Cheryle Vazquez    Provider s Name:  [facility]   Medical Record No.  [id number]   Start of Care Date:  11/22/21   Onset Date:       Type:     _X__PT   ___OT Medical Diagnosis:    Encounter Diagnoses   Name Primary?     Acute bilateral low back pain with right-sided sciatica      Lumbar radiculopathy         Treatment Diagnosis:  lumbar radiculopathy        Goals:     11/22/21 0500   Body Part   Goals listed below are for lumbar   Goal #1   Goal #1 ambulation   Previous Functional Level No restrictions   Current Functional Level Miles patient can walk   Performance Level 1/4, 7-10/10   STG Target Performance Miles patient will be able to  walk   Performance Level 1/4, 4/10   Rationale for safe community ambulation;to promote a healthy and active lifestyle   Due Date 12/13/21    LTG Target Performance [place] patient will be able to  walk   Performance Level 1, painfree   Rationale for safe community ambulation;to promote a healthy and active lifestyle   Due Date 01/03/22       Therapy Frequency:  1 x week  Predicted Duration of Therapy Intervention:  6 weeks    Lotus Baker, PT                 I CERTIFY THE NEED FOR THESE SERVICES FURNISHED UNDER        THIS PLAN OF TREATMENT AND WHILE UNDER MY CARE     (Physician attestation of this document indicates review and certification of the therapy plan).                       Certification Date From:  11/22/21   Certification Date To:  02/20/22    Referring Provider:  Marely Wong    Initial Assessment        See Epic Evaluation SOC Date: 11/22/21

## 2021-11-23 NOTE — PROGRESS NOTES
[facility]    OUTPATIENT Physical Therapy ORTHOPEDIC EVALUATION  PLAN OF TREATMENT FOR OUTPATIENT REHABILITATION  (COMPLETE FOR INITIAL CLAIMS ONLY)  Patient's Last Name, First Name, M.I.  YOB: 1990  Cheryle Vazquez    Provider s Name:  [facility]   Medical Record No.  [id number]   Start of Care Date:  11/22/21   Onset Date:   10/15/21   Type:     _X__PT   ___OT Medical Diagnosis:    Encounter Diagnoses   Name Primary?     Acute bilateral low back pain with right-sided sciatica      Lumbar radiculopathy         Treatment Diagnosis:  lumbar radiculopathy        Goals:     11/22/21 0500   Body Part   Goals listed below are for lumbar   Goal #1   Goal #1 ambulation   Previous Functional Level No restrictions   Current Functional Level Miles patient can walk   Performance Level 1/4, 7-10/10   STG Target Performance [place] patient will be able to  walk   Performance Level 1/4, 4/10   Rationale for safe community ambulation;to promote a healthy and active lifestyle   Due Date 12/13/21    LTG Target Performance [place] patient will be able to  walk   Performance Level 1, painfree   Rationale for safe community ambulation;to promote a healthy and active lifestyle   Due Date 01/03/22       Therapy Frequency:  1 x week  Predicted Duration of Therapy Intervention:  6 weeks    Lotus Baker, PT                 I CERTIFY THE NEED FOR THESE SERVICES FURNISHED UNDER        THIS PLAN OF TREATMENT AND WHILE UNDER MY CARE     (Physician attestation of this document indicates review and certification of the therapy plan).                       Certification Date From:  11/22/21   Certification Date To:  02/20/22    Referring Provider:  Marely Wong    Initial Assessment        See Epic Evaluation SOC Date: 11/22/21

## 2021-12-02 ENCOUNTER — THERAPY VISIT (OUTPATIENT)
Dept: PHYSICAL THERAPY | Facility: CLINIC | Age: 31
End: 2021-12-02
Payer: COMMERCIAL

## 2021-12-02 DIAGNOSIS — M54.16 LUMBAR RADICULOPATHY: ICD-10-CM

## 2021-12-02 PROCEDURE — 97110 THERAPEUTIC EXERCISES: CPT | Mod: GP | Performed by: PHYSICAL THERAPY ASSISTANT

## 2021-12-03 ENCOUNTER — OFFICE VISIT (OUTPATIENT)
Dept: URGENT CARE | Facility: URGENT CARE | Age: 31
End: 2021-12-03
Payer: COMMERCIAL

## 2021-12-03 ENCOUNTER — NURSE TRIAGE (OUTPATIENT)
Dept: FAMILY MEDICINE | Facility: CLINIC | Age: 31
End: 2021-12-03
Payer: COMMERCIAL

## 2021-12-03 ENCOUNTER — TELEPHONE (OUTPATIENT)
Dept: NEUROSURGERY | Facility: CLINIC | Age: 31
End: 2021-12-03
Payer: COMMERCIAL

## 2021-12-03 VITALS
OXYGEN SATURATION: 97 % | RESPIRATION RATE: 20 BRPM | TEMPERATURE: 97.8 F | SYSTOLIC BLOOD PRESSURE: 140 MMHG | WEIGHT: 293 LBS | DIASTOLIC BLOOD PRESSURE: 90 MMHG | BODY MASS INDEX: 57.16 KG/M2 | HEART RATE: 87 BPM

## 2021-12-03 DIAGNOSIS — M54.41 ACUTE RIGHT-SIDED LOW BACK PAIN WITH RIGHT-SIDED SCIATICA: Primary | ICD-10-CM

## 2021-12-03 PROCEDURE — 99214 OFFICE O/P EST MOD 30 MIN: CPT | Performed by: PHYSICIAN ASSISTANT

## 2021-12-03 RX ORDER — METHYLPREDNISOLONE 4 MG
TABLET, DOSE PACK ORAL
Qty: 21 TABLET | Refills: 0 | Status: SHIPPED | OUTPATIENT
Start: 2021-12-03 | End: 2021-12-31

## 2021-12-03 RX ORDER — HYDROCODONE BITARTRATE AND ACETAMINOPHEN 5; 325 MG/1; MG/1
1 TABLET ORAL EVERY 6 HOURS PRN
Qty: 10 TABLET | Refills: 0 | Status: SHIPPED | OUTPATIENT
Start: 2021-12-03 | End: 2021-12-06

## 2021-12-03 RX ORDER — CYCLOBENZAPRINE HCL 5 MG
5-10 TABLET ORAL 3 TIMES DAILY PRN
Qty: 20 TABLET | Refills: 0 | Status: SHIPPED | OUTPATIENT
Start: 2021-12-03 | End: 2021-12-31

## 2021-12-03 ASSESSMENT — PAIN SCALES - GENERAL: PAINLEVEL: WORST PAIN (10)

## 2021-12-03 NOTE — TELEPHONE ENCOUNTER
Last Visit: 11/19/21    Next Visit: MRI appt 12/4     Name of Provider:  Dimple Parmar CNP    Assessment: denies back pain currently Severe Right leg pain. 10/10 pain. Reports RLE weakness, denies falls. Feels like she  Drags her leg some, reports heaviness in RLE. Denies T/N. Denies bowel and bladder incontinence,  Denies saddle anesthesia, unable to bear weight on RLE. Difficulty with ambulating.     Patient was prescribed a MDP recently Got some relief from it and once pack was done pain returned. Taking OTC pain relievers, ice/heat without relief. She is having difficulty managing her pain currently.     Patient is being taken to [facility] urgent care by her mother.    Recommendation given: Agreed with patient going to UC/ED for evaluation. Patient is scheduled for lumbar MRI 12/4/21. Advised patient call our clinic on Monday morning to provide us with an update on how she is doing.  Patient verbalized understanding.

## 2021-12-03 NOTE — TELEPHONE ENCOUNTER
Ok to wait - below are reasons to go to the ER.      Can she schedule a follow up with me in a Same Day spot next week?    The patient should go to an emergency room if they experience any of the following symptoms:  - Numbness in the genitals, anus, butt and inner thigh areas  - Change in bowel or bladder function  - Worsening weakness or change in coordination  - Fever or chills    Gerardo Wong PA-C

## 2021-12-03 NOTE — TELEPHONE ENCOUNTER
"Nurse Triage SBAR    Is this a 2nd Level Triage? Yes - protocol recommends ED due to weakness in leg, but this is not new per pt. She has MRI scheduled tomorrow 12/4.    Situation: Pt completed medrol dosepak and pain is getting worse.     Background:   Seen by Neurosurgery 11/19. MRI scheduled 12/4. Has started PT and had 2 sessions.     Assessment:    Back pain  - Rates as 4-5/10 at time of conversation   - radiating into right leg and rates as 10/10, some numbness/weakness (not new), using ice, states it is \"the worst\"  - swollen right ankle   - describes back pain as \"persistent\"    Pt completed PT exercises yesterday and thinks it may have irritated her pain. States she \"went further\" in her exercises. States \"when I tried to pull up it did something and was so painful.\"     States medrol dosepak and norco helped to mute the pain. Now that she has completed it, the pain is worsening. States last night it was all 10/10. Ibuprofen has stopped working.    (See information below for more triage details.)    Recommendation: Routing to provider for recommendation on next steps. Would you like to refill pain medications? Okay to wait until MRI tomorrow or prefer she be seen in ED?    Protocol Recommended Disposition: Emergency department    Reason for Disposition    Weakness of a leg or foot (e.g., unable to bear weight, dragging foot)    Additional Information    Negative: Passed out (i.e., fainted, collapsed and was not responding)    Negative: Shock suspected (e.g., cold/pale/clammy skin, too weak to stand, low BP, rapid pulse)    Negative: Sounds like a life-threatening emergency to the triager    Negative: Major injury to the back (e.g., MVA, fall > 10 feet or 3 meters, penetrating injury, etc.)    Negative: Pain in the upper back over the ribs (rib cage) that radiates (travels) into the chest    Negative: Pain in the upper back over the ribs (rib cage) and worsened by coughing (or clearly increases with " "breathing)    Negative: SEVERE back pain of sudden onset and age > 60    Negative: SEVERE abdominal pain (e.g., excruciating)    Negative: Abdominal pain and age > 60    Negative: Unable to urinate (or only a few drops) and bladder feels very full    Negative: Loss of bladder or bowel control (urine or bowel incontinence; wetting self, leaking stool) of new onset    Negative: Numbness (loss of sensation) in groin or rectal area    Negative: Vomiting and pain over lower ribs of back (i.e., flank - kidney area)    Negative: Blood in urine (red, pink, or tea-colored)    Negative: Pain radiates into groin, scrotum    Answer Assessment - Initial Assessment Questions  1. ONSET: \"When did the pain begin?\"         Started almost 2 months ago     2. LOCATION: \"Where does it hurt?\" (upper, mid or lower back)        Mid to lower back   All the way down right side of leg     Has a \"fat ankle on right side\"   - pain on right leg as well   - rates as \"I want to take my sciatic nerve and take it out\"     3. SEVERITY: \"How bad is the pain?\"  (e.g., Scale 1-10; mild, moderate, or severe)    - MILD (1-3): doesn't interfere with normal activities     - MODERATE (4-7): interferes with normal activities or awakens from sleep     - SEVERE (8-10): excruciating pain, unable to do any normal activities         Last night was 10/10 in back, leg    Right now back is 4-5/10, and leg is \"the worst\" at 10/10   - currently icing it     4. PATTERN: \"Is the pain constant?\" (e.g., yes, no; constant, intermittent)         \"Persistent\"    5. RADIATION: \"Does the pain shoot into your legs or elsewhere?\"        Yes - radiating into leg     6. CAUSE:  \"What do you think is causing the back pain?\"         Seen by Neurosurgery - waiting on MRI 12/4     7. BACK OVERUSE:  \"Any recent lifting of heavy objects, strenuous work or exercise?\"        No     8. MEDICATIONS: \"What have you taken so far for the pain?\" (e.g., nothing, acetaminophen, NSAIDS)    Medrol " "Dosepak helped, but now pain is getting worse again  Ibuprofen - not helping   Norco - doesn't have anymore, but was helping         9. NEUROLOGIC SYMPTOMS: \"Do you have any weakness, numbness, or problems with bowel/bladder control?\"        Some numbness and weakness in right leg     No changes in bowel/bladder control     10. OTHER SYMPTOMS: \"Do you have any other symptoms?\" (e.g., fever, abdominal pain, burning with urination, blood in urine)          Denies   Had a lot of urgency while taking steroids     11. PREGNANCY: \"Is there any chance you are pregnant?\" (e.g., yes, no; LMP)          No    Protocols used: BACK PAIN-A-[place]      "

## 2021-12-03 NOTE — TELEPHONE ENCOUNTER
Per nicole Castrejon, pt instructed to go to UC if she has pain. No further action needed at this time.

## 2021-12-04 ENCOUNTER — HOSPITAL ENCOUNTER (OUTPATIENT)
Dept: MRI IMAGING | Facility: CLINIC | Age: 31
Discharge: HOME | End: 2021-12-04
Attending: NURSE PRACTITIONER | Admitting: NURSE PRACTITIONER
Payer: COMMERCIAL

## 2021-12-04 DIAGNOSIS — M54.16 LUMBAR RADICULOPATHY: ICD-10-CM

## 2021-12-04 PROCEDURE — 72148 MRI LUMBAR SPINE W/O DYE: CPT

## 2021-12-06 ENCOUNTER — TELEPHONE (OUTPATIENT)
Dept: NEUROSURGERY | Facility: CLINIC | Age: 31
End: 2021-12-06

## 2021-12-06 ENCOUNTER — THERAPY VISIT (OUTPATIENT)
Dept: PHYSICAL THERAPY | Facility: CLINIC | Age: 31
End: 2021-12-06
Payer: COMMERCIAL

## 2021-12-06 DIAGNOSIS — M54.16 LUMBAR RADICULOPATHY: ICD-10-CM

## 2021-12-06 PROCEDURE — 97110 THERAPEUTIC EXERCISES: CPT | Mod: GP | Performed by: PHYSICAL THERAPY ASSISTANT

## 2021-12-06 PROCEDURE — 97112 NEUROMUSCULAR REEDUCATION: CPT | Mod: GP | Performed by: PHYSICAL THERAPY ASSISTANT

## 2021-12-06 NOTE — TELEPHONE ENCOUNTER
Reviewed recent MRI of lumbar spine.  IMPRESSION:  1.  Moderate to large right paramedian L5-S1 disc protrusion with impingement upon the right S1 nerve root.    Called patient. Discussed imaging and next steps. Patient has been working with PT and has tried MDP x 2, but she continues to have low back pain radiating to right leg. She feels weakness in her right leg as well.     Patient is interested in surgical options. Scheduled clinic appt with Dr. Hwang to further discuss.     Advised patient to call our clinic if symptoms persist, change, or worsen. Patient voiced understanding and agreement with this plan.      Dimple Parmar [facility]  [address] [address]  Tel [phone]  Pager [phone]

## 2021-12-10 ENCOUNTER — OFFICE VISIT (OUTPATIENT)
Dept: NEUROSURGERY | Facility: CLINIC | Age: 31
End: 2021-12-10
Attending: NEUROLOGICAL SURGERY
Payer: COMMERCIAL

## 2021-12-10 VITALS — DIASTOLIC BLOOD PRESSURE: 85 MMHG | HEART RATE: 86 BPM | OXYGEN SATURATION: 95 % | SYSTOLIC BLOOD PRESSURE: 129 MMHG

## 2021-12-10 DIAGNOSIS — E66.01 MORBID OBESITY WITH BMI OF 50.0-59.9, ADULT (H): ICD-10-CM

## 2021-12-10 DIAGNOSIS — M51.16 LUMBAR DISC HERNIATION WITH RADICULOPATHY: Primary | ICD-10-CM

## 2021-12-10 PROCEDURE — 99213 OFFICE O/P EST LOW 20 MIN: CPT | Performed by: NEUROLOGICAL SURGERY

## 2021-12-10 PROCEDURE — G0463 HOSPITAL OUTPT CLINIC VISIT: HCPCS

## 2021-12-10 NOTE — PATIENT INSTRUCTIONS
Patient Next Steps:       Order placed for epidural steroid injection  o The steroid can take 10-14 days to reach max effect  o Please call our clinic if symptoms persist after this timeframe.  o You can call [facility] to schedule your injection: [phone]    Let us know how that works so we can discuss next steps.                     Please call us if you have any further questions or concerns.    [facility]   Phone: [phone]  Fax: [phone]

## 2021-12-10 NOTE — PROGRESS NOTES
It was a pleasure to see Cheryle Vazquez today in Neurosurgery Clinic. She is a 31 year old female who has had about 2 months of symptoms in the back and right lower extremity.  She has tried multiple medications and is started physical therapy but continues to have pain that radiates down the right leg and what sounds like an S1 distribution.        Past Medical History:   Diagnosis Date     Acid reflux      ADHD (attention deficit hyperactivity disorder)      Depressive disorder      Eczema      Past Surgical History:   Procedure Laterality Date     LIGATE ARTERY ETHMOID  2008        Allergies   Allergen Reactions     Amoxicillin Other (See Comments)     Caused yeast infection  Patient does not want to take Amoxicillin as it causes a yeast infection.  She has requested it be added to her allergy list.     Azithromycin Rash     Sulfamethoxazole-Trimethoprim Palpitations and Rash     Possible allergic reaction     Valacyclovir Palpitations and Rash     Possible allergic reaction       Current Outpatient Medications:      albuterol (PROAIR HFA/PROVENTIL HFA/VENTOLIN HFA) 108 (90 Base) MCG/ACT inhaler, INHALE 1 TO 2 PUFFS INTO THE LUNGS EVERY 6 HOURS, Disp: 6.7 g, Rfl: 0     albuterol (PROVENTIL) (2.5 MG/3ML) 0.083% neb solution, Take 1 vial (2.5 mg) by nebulization every 6 hours as needed (Patient not taking: Reported on 11/16/2021), Disp: 25 vial, Rfl: 0     citalopram (CELEXA) 20 MG tablet, Take 1 tablet (20 mg) by mouth daily, Disp: 90 tablet, Rfl: 3     cyclobenzaprine (FLEXERIL) 5 MG tablet, Take 1-2 tablets (5-10 mg) by mouth 3 times daily as needed for muscle spasms, Disp: 20 tablet, Rfl: 0     fluticasone (FLONASE) 50 MCG/ACT nasal spray, Spray 1 spray into both nostrils daily, Disp: 16 g, Rfl: 3     lisdexamfetamine (VYVANSE) 50 MG capsule, Take 1 capsule (50 mg) by mouth every morning, Disp: 30 capsule, Rfl: 0     methylPREDNISolone (MEDROL DOSEPAK) 4 MG tablet therapy pack, Follow Package Directions,  "Disp: 21 tablet, Rfl: 0     methylPREDNISolone (MEDROL DOSEPAK) 4 MG tablet therapy pack, Follow Package Directions (Patient not taking: Reported on 12/3/2021), Disp: 21 tablet, Rfl: 0  Social History     Socioeconomic History     Marital status:      Spouse name: Not on file     Number of children: Not on file     Years of education: Not on file     Highest education level: Not on file   Occupational History     Not on file   Tobacco Use     Smoking status: Current Some Day Smoker     Packs/day: 0.25     Years: 0.50     Pack years: 0.12     Types: Cigarettes     Smokeless tobacco: Former User     Tobacco comment: Trying to stop---Hasn't smoked for a week.   Substance and Sexual Activity     Alcohol use: Yes     Comment: minimal     Drug use: Yes     Frequency: 3.0 times per week     Types: Marijuana     Sexual activity: Not Currently   Other Topics Concern     Parent/sibling w/ CABG, MI or angioplasty before 65F 55M? Not Asked   Social History Narrative     Not on file     Social Determinants of Health     Financial Resource Strain: Not on file   Food Insecurity: Not on file   Transportation Needs: Not on file   Physical Activity: Not on file   Stress: Not on file   Social Connections: Not on file   Intimate Partner Violence: Not on file   Housing Stability: Not on file      Problem (# of Occurrences) Relation (Name,Age of Onset)    Asthma (1) Mother    Hypertension (1) Paternal Grandmother    No Known Problems (1) Father           ROS: 10 point ROS neg other than the symptoms noted above in the HPI.    There were no vitals filed for this visit.  Estimated body mass index is 57.16 kg/m  as calculated from the following:    Height as of 11/19/21: 1.626 m (5' 4\").    Weight as of 12/3/21: 151 kg (333 lb).  Data Unavailable    Oswestry (CONSUELO) Questionnaire    OSWESTRY DISABILITY INDEX 11/22/2021   Count 9   Sum 27   Oswestry Score (%) 60   Some recent data might be hidden       Visual Analog Scale (VAS) " Questionnaire    No flowsheet data found.       Awake alert and oriented.  Bilateral lower extremity strength is 5 out of 5 in all muscle groups.  Reflexes symmetric and normal at patella and Achilles.  Sensation intact to light touch bilaterally.    Positive straight leg raise on right.    Imaging: MRI of the lumbar spine shows a large right-sided L5-S1 herniated disc that seems to be concordant with her symptoms.  Imaging was reviewed with the patient shown to the patient in clinic today.    Assessment: Right L5-S1 herniated disc with radiculopathy.    Plan: The patient is hesitant to undergo surgery.  I think trying an epidural steroid injection first would be a reasonable way to try to improve her symptoms.  However if this continues to be problematic then I think a right L5-S1 microdiscectomy would be a reasonable course of action.  We will work on scheduling the injection and then see how she does after that.

## 2021-12-10 NOTE — LETTER
12/10/2021         RE: Cheryle Vazquez  [address]  [place] [address]        Dear Colleague,    Thank you for referring your patient, Cheryle Vazquez, to the [facility] [place]. Please see a copy of my visit note below.    It was a pleasure to see Cheryle Vazquez today in Neurosurgery Clinic. She is a 31 year old female who has had about 2 months of symptoms in the back and right lower extremity.  She has tried multiple medications and is started physical therapy but continues to have pain that radiates down the right leg and what sounds like an S1 distribution.        Past Medical History:   Diagnosis Date     Acid reflux      ADHD (attention deficit hyperactivity disorder)      Depressive disorder      Eczema      Past Surgical History:   Procedure Laterality Date     LIGATE ARTERY ETHMOID  2008        Allergies   Allergen Reactions     Amoxicillin Other (See Comments)     Caused yeast infection  Patient does not want to take Amoxicillin as it causes a yeast infection.  She has requested it be added to her allergy list.     Azithromycin Rash     Sulfamethoxazole-Trimethoprim Palpitations and Rash     Possible allergic reaction     Valacyclovir Palpitations and Rash     Possible allergic reaction       Current Outpatient Medications:      albuterol (PROAIR HFA/PROVENTIL HFA/VENTOLIN HFA) 108 (90 Base) MCG/ACT inhaler, INHALE 1 TO 2 PUFFS INTO THE LUNGS EVERY 6 HOURS, Disp: 6.7 g, Rfl: 0     albuterol (PROVENTIL) (2.5 MG/3ML) 0.083% neb solution, Take 1 vial (2.5 mg) by nebulization every 6 hours as needed (Patient not taking: Reported on 11/16/2021), Disp: 25 vial, Rfl: 0     citalopram (CELEXA) 20 MG tablet, Take 1 tablet (20 mg) by mouth daily, Disp: 90 tablet, Rfl: 3     cyclobenzaprine (FLEXERIL) 5 MG tablet, Take 1-2 tablets (5-10 mg) by mouth 3 times daily as needed for muscle spasms, Disp: 20 tablet, Rfl: 0     fluticasone (FLONASE) 50 MCG/ACT nasal  spray, Spray 1 spray into both nostrils daily, Disp: 16 g, Rfl: 3     lisdexamfetamine (VYVANSE) 50 MG capsule, Take 1 capsule (50 mg) by mouth every morning, Disp: 30 capsule, Rfl: 0     methylPREDNISolone (MEDROL DOSEPAK) 4 MG tablet therapy pack, Follow Package Directions, Disp: 21 tablet, Rfl: 0     methylPREDNISolone (MEDROL DOSEPAK) 4 MG tablet therapy pack, Follow Package Directions (Patient not taking: Reported on 12/3/2021), Disp: 21 tablet, Rfl: 0  Social History     Socioeconomic History     Marital status:      Spouse name: Not on file     Number of children: Not on file     Years of education: Not on file     Highest education level: Not on file   Occupational History     Not on file   Tobacco Use     Smoking status: Current Some Day Smoker     Packs/day: 0.25     Years: 0.50     Pack years: 0.12     Types: Cigarettes     Smokeless tobacco: Former User     Tobacco comment: Trying to stop---Hasn't smoked for a week.   Substance and Sexual Activity     Alcohol use: Yes     Comment: minimal     Drug use: Yes     Frequency: 3.0 times per week     Types: Marijuana     Sexual activity: Not Currently   Other Topics Concern     Parent/sibling w/ CABG, MI or angioplasty before 65F 55M? Not Asked   Social History Narrative     Not on file     Social Determinants of Health     Financial Resource Strain: Not on file   Food Insecurity: Not on file   Transportation Needs: Not on file   Physical Activity: Not on file   Stress: Not on file   Social Connections: Not on file   Intimate Partner Violence: Not on file   Housing Stability: Not on file      Problem (# of Occurrences) Relation (Name,Age of Onset)    Asthma (1) Mother    Hypertension (1) Paternal Grandmother    No Known Problems (1) Father           ROS: 10 point ROS neg other than the symptoms noted above in the HPI.    There were no vitals filed for this visit.  Estimated body mass index is 57.16 kg/m  as calculated from the following:    Height as of  "11/19/21: 1.626 m (5' 4\").    Weight as of 12/3/21: 151 kg (333 lb).  Data Unavailable    Oswestry (CONSUELO) Questionnaire    OSWESTRY DISABILITY INDEX 11/22/2021   Count 9   Sum 27   Oswestry Score (%) 60   Some recent data might be hidden       Visual Analog Scale (VAS) Questionnaire    No flowsheet data found.       Awake alert and oriented.  Bilateral lower extremity strength is 5 out of 5 in all muscle groups.  Reflexes symmetric and normal at patella and Achilles.  Sensation intact to light touch bilaterally.    Positive straight leg raise on right.    Imaging: MRI of the lumbar spine shows a large right-sided L5-S1 herniated disc that seems to be concordant with her symptoms.  Imaging was reviewed with the patient shown to the patient in clinic today.    Assessment: Right L5-S1 herniated disc with radiculopathy.    Plan: The patient is hesitant to undergo surgery.  I think trying an epidural steroid injection first would be a reasonable way to try to improve her symptoms.  However if this continues to be problematic then I think a right L5-S1 microdiscectomy would be a reasonable course of action.  We will work on scheduling the injection and then see how she does after that.         Again, thank you for allowing me to participate in the care of your patient.        Sincerely,        Andrzej Hwang MD    "

## 2021-12-11 ENCOUNTER — OFFICE VISIT (OUTPATIENT)
Dept: URGENT CARE | Facility: URGENT CARE | Age: 31
End: 2021-12-11
Payer: COMMERCIAL

## 2021-12-11 VITALS
SYSTOLIC BLOOD PRESSURE: 120 MMHG | TEMPERATURE: 97.8 F | WEIGHT: 293 LBS | OXYGEN SATURATION: 97 % | RESPIRATION RATE: 18 BRPM | DIASTOLIC BLOOD PRESSURE: 78 MMHG | BODY MASS INDEX: 56.75 KG/M2 | HEART RATE: 95 BPM

## 2021-12-11 DIAGNOSIS — M51.16 LUMBAR DISC HERNIATION WITH RADICULOPATHY: Primary | ICD-10-CM

## 2021-12-11 DIAGNOSIS — G89.29 OTHER CHRONIC PAIN: ICD-10-CM

## 2021-12-11 PROCEDURE — 99214 OFFICE O/P EST MOD 30 MIN: CPT | Performed by: PHYSICIAN ASSISTANT

## 2021-12-11 RX ORDER — HYDROCODONE BITARTRATE AND ACETAMINOPHEN 5; 325 MG/1; MG/1
1 TABLET ORAL EVERY 6 HOURS PRN
Qty: 8 TABLET | Refills: 0 | Status: SHIPPED | OUTPATIENT
Start: 2021-12-11 | End: 2021-12-14

## 2021-12-11 RX ORDER — CYCLOBENZAPRINE HCL 10 MG
10 TABLET ORAL 3 TIMES DAILY PRN
Qty: 30 TABLET | Refills: 0 | Status: SHIPPED | OUTPATIENT
Start: 2021-12-11 | End: 2021-12-17

## 2021-12-11 RX ORDER — PREDNISONE 20 MG/1
20 TABLET ORAL 2 TIMES DAILY
Qty: 10 TABLET | Refills: 0 | Status: SHIPPED | OUTPATIENT
Start: 2021-12-11 | End: 2021-12-31

## 2021-12-11 NOTE — PROGRESS NOTES
Assessment & Plan     Lumbar disc herniation with radiculopathy  Prednisone  Flexeril  Follow up with PCP for ongoing pain control  Continue discussion with surgeon regarding DDD and disk herniations  - cyclobenzaprine (FLEXERIL) 10 MG tablet; Take 1 tablet (10 mg) by mouth 3 times daily as needed for muscle spasms  - predniSONE (DELTASONE) 20 MG tablet; Take 1 tablet (20 mg) by mouth 2 times daily  - HYDROcodone-acetaminophen (NORCO) 5-325 MG tablet; Take 1 tablet by mouth every 6 hours as needed for severe pain    Other chronic pain  Patient has exacerbation of chronic pain  Follow up with PCP       Review of external notes as documented elsewhere in note       Tobacco Cessation:   reports that she has been smoking cigarettes. She has a 0.13 pack-year smoking history. She has quit using smokeless tobacco.      No follow-ups on file.    Rolf Ko PA-C  [facility] Hospital[facility]ILDA Lobato is a 31 year old who presents for the following health issues     HPI     Chronic pain   Exacerbation of back pain    Review of Systems   Constitutional, HEENT, cardiovascular, pulmonary, gi and gu systems are negative, except as otherwise noted.      Objective    /78   Pulse 95   Temp 97.8  F (36.6  C)   Resp 18   Wt 150 kg (330 lb 9.6 oz)   SpO2 97%   BMI 56.75 kg/m    Body mass index is 56.75 kg/m .  Physical Exam   GENERAL: healthy, alert and no distress  MS: Positive for lumbar pain  SKIN: no suspicious lesions or rashes  NEURO: Normal strength and tone, mentation intact and speech normal

## 2021-12-17 ENCOUNTER — OFFICE VISIT (OUTPATIENT)
Dept: NEUROSURGERY | Facility: CLINIC | Age: 31
End: 2021-12-17
Attending: NEUROLOGICAL SURGERY
Payer: COMMERCIAL

## 2021-12-17 VITALS
HEART RATE: 110 BPM | BODY MASS INDEX: 56.64 KG/M2 | WEIGHT: 293 LBS | OXYGEN SATURATION: 97 % | DIASTOLIC BLOOD PRESSURE: 84 MMHG | SYSTOLIC BLOOD PRESSURE: 126 MMHG

## 2021-12-17 DIAGNOSIS — E66.01 MORBID OBESITY WITH BMI OF 50.0-59.9, ADULT (H): ICD-10-CM

## 2021-12-17 DIAGNOSIS — M51.16 LUMBAR DISC HERNIATION WITH RADICULOPATHY: Primary | ICD-10-CM

## 2021-12-17 PROCEDURE — 99214 OFFICE O/P EST MOD 30 MIN: CPT | Performed by: NEUROLOGICAL SURGERY

## 2021-12-17 PROCEDURE — G0463 HOSPITAL OUTPT CLINIC VISIT: HCPCS

## 2021-12-17 RX ORDER — CYCLOBENZAPRINE HCL 10 MG
10 TABLET ORAL 3 TIMES DAILY PRN
Qty: 30 TABLET | Refills: 0 | Status: SHIPPED | OUTPATIENT
Start: 2021-12-17 | End: 2021-12-29

## 2021-12-17 ASSESSMENT — PAIN SCALES - GENERAL: PAINLEVEL: SEVERE PAIN (6)

## 2021-12-17 NOTE — PROGRESS NOTES
"It was a pleasure to see Cheryle Vazquez today in Neurosurgery Clinic. She is a 31 year old female who is recently seen for her right L5-S1 disc herniation with radiculopathy.  Please see my note from December 10 for the full details of her illness.    She continues to have symptoms in the right lower extremity consistent with S1 radiculopathy.  These have not improved.    She is hesitant to undergo an epidural steroid injection.    Vitals:    12/17/21 1345   BP: 126/84   Pulse: 110   SpO2: 97%   Weight: 149.7 kg (330 lb)     Estimated body mass index is 56.64 kg/m  as calculated from the following:    Height as of 11/19/21: 1.626 m (5' 4\").    Weight as of this encounter: 149.7 kg (330 lb).  Severe Pain (6)    Exam stable, consistent with previous exam.      Imaging: MRI of the lumbar spine demonstrates a large herniated disc fragment on the right at L5-S1.  The imaging was reviewed with the patient shown to the patient in clinic today.    Assessment: Right L5-S1 herniated disc with radiculopathy.    Plan: I think a right L5-S1 microdiscectomy would be a reasonable course of action.The risks benefits and alternatives to the procedure were discussed. Risks include, but are not limited to, bleeding, infection, CSF leak, neurologic injury such as spinal cord or nerve root injury, need for further surgery, failure for symptoms to improve. Questions were solicited and answered, and the patient wishes to proceed with surgery.         "

## 2021-12-17 NOTE — PATIENT INSTRUCTIONS
Patient Instructions    Surgery scheduled at [facility] for RIght Lumbar 5 to Sacral 1 microdiscectomy with Dr. Hwang     Pre-Operative    Surgical risks: blood clots in the leg or lung, problems urinating, nerve damage, drainage from the incision, infection,stiffness    Pre-operative physical with primary care physician within 30 days of surgical date.     Likely same day procedure with discharge home day of surgery, may stay for 23 hour observation hospitalization for monitoring.       Shower procedure  o Please shower with antimicrobial soap the night before surgery and morning of surgery. Please refer to showering instruction sheet in folder.    Eating/Drinking  o Stop all solid foods 8 hours before surgery.  o Keep drinking clear liquids until 4 hours before surgery  - Clear liquids include water, clear juice, black coffee, or clear tea without milk, Gatorade, clear soda.     Medications  o Hold Aspirin, NSAIDs (Advil/Ibuprofen, Indocin, Naproxen,Nuprin,Relafen/Nabumetone, Diclofenac,Meloxicam, Aleve, Celebrex) x 7 days prior to surgical date   o You can take Tylenol (Acetaminophen) for pain, 1000 mg  - Do not exceed 3,000 mg per day   o Any other medications prescribed, please discuss with your primary care provider at your pre-operative physical     As part of preparation for your upcoming procedure you are required to have a test for the novel Coronavirus, COVID-19  o Please call the drive-up testing number to schedule your appointment. The test needs to be completed within 4 days (96) hours of surgery.   o Scheduling Number: [phone]   o You may NOT receive the COVID-19 vaccine 72 hours before or after surgery.    Pain Management    You will have post-operative incisional pain which may require pain medications and muscle relaxants. You will receive medication upon discharge.    You may resume taking NSAIDs (ex. Ibuprofen, aleve, naproxen) immediately post-op      Do NOT drive  while taking narcotic pain medication    Do NOT drink alcohol while using any pain medication    You can utilize ice as needed (no longer than 20 minutes at one time)    Incision Care    No submerging incision in water such as pools, hot tubs, baths for at least 8 weeks or until incision is healed    It is okay to shower, just pat the incision dry     Remove dressing as instructed upon discharge    Watch for signs of infection  o Redness, swelling, warmth, drainage, and fever of 101 degrees or higher  o Notify clinic [phone].    Activity Restrictions    For the first 6-8 weeks, no lifting > 10 pounds, limited bending, twisting, or overhead reaching.    Take stairs in moderation     Ok to walk as tolerated, take short frequent walks. You may gradually increase the distance as tolerated.     Avoid bed rest and prolonged sitting for longer than 30 minutes (change positions frequently while awake)    No contact sports until after follow up visit    No high impact activities such as; running/jogging, snowmobile or 4 gimenez riding or any other recreational vehicles    Please call the clinic if you develop any of the following symptoms:  o Swelling and/or warmth in one or both legs  o Pain or tenderness in your leg, ankle, foot, or arm   o Red or discolored skin     Post-Op Follow Up Appointments    2 week incision check     6 week post op follow up visit with Physician Assistant     3 months post op with Dr. Hwang     Please call to schedule follow up appointment at [phone]    Resources    If you are currently employed, you will need to be off work for 2-4 weeks for post op recovery and healing.    Please fax any FMLA/short term disability paperwork to [phone]    You may call our clinic when you'd like to return to work and we can provide a work letter    To allow staff adequate time to complete paperwork, please send as soon as possible

## 2021-12-17 NOTE — NURSING NOTE
Discussed with Andriy Bansal for refill of flexeril if its working. Sent electronically to her pharmacy. Updated patient in [place].    Reviewed pre- and post-operative instructions as outlined in the After Visit Summary/Patient Instructions with patient.   Surgery folder was given to patient    Patient Education Topic: Procedure with Dr. Hwang     Learner(s): Patient    Knowledge Level: Basic    Readiness to Learn: Ready    Method:  Verbal explanation and Written material     Outcome: Able to verbalize instructions    Barriers to Learning: No barrier    Covid Testing: patient educated they will need to have a test for the novel Coronavirus, COVID-19.The test needs to be completed within 4 days (96) hours of surgery. Order Placed.    Scheduling Number: [phone]    NDI/CONSUELO: Confirmation of completion within last 6 months       Patient had the opportunity for questions to be answered. Advised Patient to call clinic with any questions/concerns. Gave patient antibacterial soap for pre-surgery skin preparation.

## 2021-12-17 NOTE — NURSING NOTE
"Cheryle Vazquez is a 31 year old female who presents for:  Chief Complaint   Patient presents with     RECHECK     discuss surgery        Vitals:    Vitals:    12/17/21 1345   BP: 126/84   Pulse: 110   SpO2: 97%   Weight: 330 lb (149.7 kg)       BMI:  Estimated body mass index is 56.64 kg/m  as calculated from the following:    Height as of 11/19/21: 5' 4\" (1.626 m).    Weight as of this encounter: 330 lb (149.7 kg).    Pain Score:  Severe Pain (6)        Amendo Phorn      "

## 2021-12-20 ENCOUNTER — THERAPY VISIT (OUTPATIENT)
Dept: PHYSICAL THERAPY | Facility: CLINIC | Age: 31
End: 2021-12-20
Payer: COMMERCIAL

## 2021-12-20 DIAGNOSIS — M51.16 LUMBAR DISC HERNIATION WITH RADICULOPATHY: ICD-10-CM

## 2021-12-20 PROCEDURE — 97110 THERAPEUTIC EXERCISES: CPT | Mod: GP | Performed by: PHYSICAL THERAPIST

## 2021-12-22 DIAGNOSIS — Z11.59 ENCOUNTER FOR SCREENING FOR OTHER VIRAL DISEASES: ICD-10-CM

## 2021-12-29 ENCOUNTER — OFFICE VISIT (OUTPATIENT)
Dept: URGENT CARE | Facility: URGENT CARE | Age: 31
End: 2021-12-29
Payer: COMMERCIAL

## 2021-12-29 ENCOUNTER — TELEPHONE (OUTPATIENT)
Dept: FAMILY MEDICINE | Facility: CLINIC | Age: 31
End: 2021-12-29

## 2021-12-29 VITALS
OXYGEN SATURATION: 97 % | BODY MASS INDEX: 56.64 KG/M2 | SYSTOLIC BLOOD PRESSURE: 124 MMHG | HEART RATE: 94 BPM | WEIGHT: 293 LBS | DIASTOLIC BLOOD PRESSURE: 92 MMHG

## 2021-12-29 DIAGNOSIS — M51.16 LUMBAR DISC HERNIATION WITH RADICULOPATHY: ICD-10-CM

## 2021-12-29 DIAGNOSIS — M54.41 ACUTE RIGHT-SIDED LOW BACK PAIN WITH RIGHT-SIDED SCIATICA: Primary | ICD-10-CM

## 2021-12-29 PROCEDURE — 99214 OFFICE O/P EST MOD 30 MIN: CPT | Mod: 25 | Performed by: PHYSICIAN ASSISTANT

## 2021-12-29 PROCEDURE — 96372 THER/PROPH/DIAG INJ SC/IM: CPT | Performed by: PHYSICIAN ASSISTANT

## 2021-12-29 RX ORDER — KETOROLAC TROMETHAMINE 30 MG/ML
30 INJECTION, SOLUTION INTRAMUSCULAR; INTRAVENOUS ONCE
Status: COMPLETED | OUTPATIENT
Start: 2021-12-29 | End: 2021-12-29

## 2021-12-29 RX ORDER — CYCLOBENZAPRINE HCL 10 MG
10 TABLET ORAL 3 TIMES DAILY PRN
Qty: 30 TABLET | Refills: 0 | Status: ON HOLD | OUTPATIENT
Start: 2021-12-29 | End: 2022-01-20

## 2021-12-29 RX ADMIN — KETOROLAC TROMETHAMINE 30 MG: 30 INJECTION, SOLUTION INTRAMUSCULAR; INTRAVENOUS at 14:55

## 2021-12-29 NOTE — TELEPHONE ENCOUNTER
Cheryle called-in to report that she's out of all of her pain meds, please reach her at [phone]

## 2021-12-29 NOTE — TELEPHONE ENCOUNTER
Patient was seen at  today for back pain. Patient states that she was told by  and surgeon's office that she needed to contact PCP for pain control prior to her surgery. Patient is scheduled for back surgery 1/20/22.    Patient states that she was crying for 2 hours yesterday because her back pain was unbearable..    Please advise of appointment time to discuss pain medication. Coral Farris RN

## 2021-12-29 NOTE — TELEPHONE ENCOUNTER
Last Visit:   12/17/21 1/20/22  RIght Lumbar 5 to Sacral 1 microdiscectomy   with Dr Hwang.       Assessment:   The patient is just leaving urgent care.  The pain was greater then 10/10.  It is located in the right back/buttocks and radiates on the outside of the leg to the foot. She reports it is more like muscle spasms. No bowel or bladder issues.   At the appointment 12/17 she was given flexeril which she thought was effective.  Tylenol does not work. Ibuprofen 800 mg every 6-8 hours.  She is holding the ibuprofen since she just received a dose of toradol in UC.   Ice and heat.     11/18/21 and 12/3/21 MDPs  12/11/21 prednisone  X 5 days     Recommendation given:    Will ask the care team for pain management recommendations.  She understands that neurosurgery typically manages post surgical pain and she may need to reach out to her PCP.    Action needed from provider:    Advise on pain management

## 2021-12-30 NOTE — TELEPHONE ENCOUNTER
Pt called back.  No same days avail today.  Scheduled using [facility] same day tomorrow - pt needs preop so scheduled for this as addressing pre-surgical pain is appropriate for pre-op.  Jenn Montes, HEIDI  [facility] RN Triage Team

## 2021-12-31 ENCOUNTER — OFFICE VISIT (OUTPATIENT)
Dept: FAMILY MEDICINE | Facility: CLINIC | Age: 31
End: 2021-12-31
Payer: COMMERCIAL

## 2021-12-31 VITALS
DIASTOLIC BLOOD PRESSURE: 70 MMHG | HEART RATE: 87 BPM | TEMPERATURE: 98.4 F | WEIGHT: 293 LBS | RESPIRATION RATE: 16 BRPM | BODY MASS INDEX: 57.5 KG/M2 | OXYGEN SATURATION: 95 % | SYSTOLIC BLOOD PRESSURE: 126 MMHG

## 2021-12-31 DIAGNOSIS — R06.83 SNORING: ICD-10-CM

## 2021-12-31 DIAGNOSIS — Z01.818 PREOP GENERAL PHYSICAL EXAM: Primary | ICD-10-CM

## 2021-12-31 DIAGNOSIS — M51.16 LUMBAR DISC HERNIATION WITH RADICULOPATHY: ICD-10-CM

## 2021-12-31 DIAGNOSIS — Z23 HIGH PRIORITY FOR 2019-NCOV VACCINE: ICD-10-CM

## 2021-12-31 LAB
ANION GAP SERPL CALCULATED.3IONS-SCNC: 7 MMOL/L (ref 3–14)
BUN SERPL-MCNC: 13 MG/DL (ref 7–30)
CALCIUM SERPL-MCNC: 8.9 MG/DL (ref 8.5–10.1)
CHLORIDE BLD-SCNC: 109 MMOL/L (ref 94–109)
CO2 SERPL-SCNC: 26 MMOL/L (ref 20–32)
CREAT SERPL-MCNC: 0.83 MG/DL (ref 0.52–1.04)
ERYTHROCYTE [DISTWIDTH] IN BLOOD BY AUTOMATED COUNT: 12.8 % (ref 10–15)
GFR SERPL CREATININE-BSD FRML MDRD: >90 ML/MIN/1.73M2
GLUCOSE BLD-MCNC: 100 MG/DL (ref 70–99)
HCT VFR BLD AUTO: 41.6 % (ref 35–47)
HGB BLD-MCNC: 13.8 G/DL (ref 11.7–15.7)
MCH RBC QN AUTO: 30.3 PG (ref 26.5–33)
MCHC RBC AUTO-ENTMCNC: 33.2 G/DL (ref 31.5–36.5)
MCV RBC AUTO: 91 FL (ref 78–100)
PLATELET # BLD AUTO: 230 10E3/UL (ref 150–450)
POTASSIUM BLD-SCNC: 4.1 MMOL/L (ref 3.4–5.3)
RBC # BLD AUTO: 4.55 10E6/UL (ref 3.8–5.2)
SODIUM SERPL-SCNC: 142 MMOL/L (ref 133–144)
WBC # BLD AUTO: 5.2 10E3/UL (ref 4–11)

## 2021-12-31 PROCEDURE — 85027 COMPLETE CBC AUTOMATED: CPT | Performed by: PHYSICIAN ASSISTANT

## 2021-12-31 PROCEDURE — 36415 COLL VENOUS BLD VENIPUNCTURE: CPT | Performed by: PHYSICIAN ASSISTANT

## 2021-12-31 PROCEDURE — 99214 OFFICE O/P EST MOD 30 MIN: CPT | Performed by: PHYSICIAN ASSISTANT

## 2021-12-31 PROCEDURE — 91300 COVID-19,PF,PFIZER (12+ YRS): CPT | Performed by: PHYSICIAN ASSISTANT

## 2021-12-31 PROCEDURE — 80048 BASIC METABOLIC PNL TOTAL CA: CPT | Performed by: PHYSICIAN ASSISTANT

## 2021-12-31 PROCEDURE — 0004A COVID-19,PF,PFIZER (12+ YRS): CPT | Performed by: PHYSICIAN ASSISTANT

## 2021-12-31 ASSESSMENT — ASTHMA QUESTIONNAIRES
ACT_TOTALSCORE: 19
QUESTION_5 LAST FOUR WEEKS HOW WOULD YOU RATE YOUR ASTHMA CONTROL: WELL CONTROLLED
QUESTION_1 LAST FOUR WEEKS HOW MUCH OF THE TIME DID YOUR ASTHMA KEEP YOU FROM GETTING AS MUCH DONE AT WORK, SCHOOL OR AT HOME: A LITTLE OF THE TIME
QUESTION_2 LAST FOUR WEEKS HOW OFTEN HAVE YOU HAD SHORTNESS OF BREATH: ONCE OR TWICE A WEEK
QUESTION_3 LAST FOUR WEEKS HOW OFTEN DID YOUR ASTHMA SYMPTOMS (WHEEZING, COUGHING, SHORTNESS OF BREATH, CHEST TIGHTNESS OR PAIN) WAKE YOU UP AT NIGHT OR EARLIER THAN USUAL IN THE MORNING: ONCE OR TWICE
QUESTION_4 LAST FOUR WEEKS HOW OFTEN HAVE YOU USED YOUR RESCUE INHALER OR NEBULIZER MEDICATION (SUCH AS ALBUTEROL): TWO OR THREE TIMES PER WEEK

## 2021-12-31 ASSESSMENT — PATIENT HEALTH QUESTIONNAIRE - PHQ9
SUM OF ALL RESPONSES TO PHQ QUESTIONS 1-9: 6
SUM OF ALL RESPONSES TO PHQ QUESTIONS 1-9: 6
10. IF YOU CHECKED OFF ANY PROBLEMS, HOW DIFFICULT HAVE THESE PROBLEMS MADE IT FOR YOU TO DO YOUR WORK, TAKE CARE OF THINGS AT HOME, OR GET ALONG WITH OTHER PEOPLE: NOT DIFFICULT AT ALL

## 2021-12-31 ASSESSMENT — PAIN SCALES - GENERAL: PAINLEVEL: EXTREME PAIN (8)
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
## 2022-01-01 ASSESSMENT — ASTHMA QUESTIONNAIRES: ACT_TOTALSCORE: 19

## 2022-01-01 ASSESSMENT — PATIENT HEALTH QUESTIONNAIRE - PHQ9: SUM OF ALL RESPONSES TO PHQ QUESTIONS 1-9: 6

## 2022-01-03 NOTE — RESULT ENCOUNTER NOTE
Cheryle    Your lab tests are complete and I have reviewed the results.     - Your lab results look great; everything is normal.    If you have any questions or concerns, please feel free to call or send a [employer] message.    Sincerely,  Gerardo Wong PA-C

## 2022-01-07 NOTE — PROGRESS NOTES
SUBJECTIVE  HPI: Cheryle Vazquez is a 31 year old female who presents for evaluation of back pain.  Right sided with raditiation into right glute. Currently taking medications, scheduled for surgery.  No numbness or loss of function.       Past Medical History:   Diagnosis Date     Acid reflux      ADHD (attention deficit hyperactivity disorder)      Depressive disorder      Eczema      Current Outpatient Medications   Medication Sig Dispense Refill     albuterol (PROAIR HFA/PROVENTIL HFA/VENTOLIN HFA) 108 (90 Base) MCG/ACT inhaler INHALE 1 TO 2 PUFFS INTO THE LUNGS EVERY 6 HOURS 6.7 g 0     albuterol (PROVENTIL) (2.5 MG/3ML) 0.083% neb solution Take 1 vial (2.5 mg) by nebulization every 6 hours as needed 25 vial 0     citalopram (CELEXA) 20 MG tablet Take 1 tablet (20 mg) by mouth daily 90 tablet 3     fluticasone (FLONASE) 50 MCG/ACT nasal spray Spray 1 spray into both nostrils daily 16 g 3     lisdexamfetamine (VYVANSE) 50 MG capsule Take 1 capsule (50 mg) by mouth every morning 30 capsule 0     cyclobenzaprine (FLEXERIL) 10 MG tablet Take 1 tablet (10 mg) by mouth 3 times daily as needed for muscle spasms 30 tablet 0     Social History     Tobacco Use     Smoking status: Current Some Day Smoker     Packs/day: 0.25     Years: 0.50     Pack years: 0.12     Types: Cigarettes     Smokeless tobacco: Former User     Tobacco comment: Trying to stop---Hasn't smoked for a week.   Substance Use Topics     Alcohol use: Yes     Comment: minimal       ROS:  Review of systems negative except as stated above.    OBJECTIVE:  BP (!) 124/92 (BP Location: Right arm, Patient Position: Sitting, Cuff Size: Adult Large)   Pulse 94   Wt 149.7 kg (330 lb)   SpO2 97%   BMI 56.64 kg/m    Back examination: Back symmetric, no curvature. ROM normal. No CVA tenderness.  [unfilled] leg raise test: negative  GENERAL APPEARANCE: healthy, alert and no distress  RESP: lungs clear to auscultation - no rales, rhonchi or wheezes  CV:  regular rates and rhythm, normal S1 S2, no murmur noted  NEURO: Normal strength and tone with no weakness or sensory deficit noted, reflexes normal   SKIN: no suspicious lesions or rashes      No results found for any visits on 12/29/21.       ASSESSMENT/IMPRESSION:  (M54.41) Acute right-sided low back pain with right-sided sciatica  (primary encounter diagnosis)  Plan: ketorolac (TORADOL) injection 30 mg    There are no Patient Instructions on file for this visit.

## 2022-01-15 ENCOUNTER — HEALTH MAINTENANCE LETTER (OUTPATIENT)
Age: 32
End: 2022-01-15

## 2022-01-17 ENCOUNTER — LAB (OUTPATIENT)
Dept: URGENT CARE | Facility: URGENT CARE | Age: 32
End: 2022-01-17
Attending: NEUROLOGICAL SURGERY
Payer: COMMERCIAL

## 2022-01-17 DIAGNOSIS — Z11.59 ENCOUNTER FOR SCREENING FOR OTHER VIRAL DISEASES: ICD-10-CM

## 2022-01-17 PROCEDURE — U0003 INFECTIOUS AGENT DETECTION BY NUCLEIC ACID (DNA OR RNA); SEVERE ACUTE RESPIRATORY SYNDROME CORONAVIRUS 2 (SARS-COV-2) (CORONAVIRUS DISEASE [COVID-19]), AMPLIFIED PROBE TECHNIQUE, MAKING USE OF HIGH THROUGHPUT TECHNOLOGIES AS DESCRIBED BY CMS-2020-01-R: HCPCS

## 2022-01-17 PROCEDURE — U0005 INFEC AGEN DETEC AMPLI PROBE: HCPCS

## 2022-01-18 LAB — SARS-COV-2 RNA RESP QL NAA+PROBE: NEGATIVE

## 2022-01-19 ENCOUNTER — ANESTHESIA EVENT (OUTPATIENT)
Dept: SURGERY | Facility: CLINIC | Age: 32
End: 2022-01-19
Payer: COMMERCIAL

## 2022-01-19 NOTE — PROGRESS NOTES
SUBJECTIVE  HPI: Cheryle Vazquez is a 31 year old female who presents for evaluation of back pain hx of back issues and been dealing with for months.  Is doing PT.  Having a flare of pain.  Seeing Neurosurgery soon   Has pain in the right lower back into buttocks     Personal hx of back pain is recurrent self limited episodes of low back pain in the past.  Pain is exacerbated by: standing, walking, bending and changing position.  Pain is relieved by: none[unfilled] sx include: denies, fecal incontinence (Direct patient to the ER), lower extremity numbness bilateral, tingling, urinary incontinence (Diret patient to the ER), radicular lower extremity symptoms right and lower extremity weakness right.  Red flag symptoms: negative for, fever, chills, night sweats, weight loss, weight gain, headaches, dizziness, fatigue, weakness.    Past Medical History:   Diagnosis Date     Acid reflux      ADHD (attention deficit hyperactivity disorder)      Depressive disorder      Eczema      Current Outpatient Medications   Medication Sig Dispense Refill     albuterol (PROAIR HFA/PROVENTIL HFA/VENTOLIN HFA) 108 (90 Base) MCG/ACT inhaler INHALE 1 TO 2 PUFFS INTO THE LUNGS EVERY 6 HOURS 6.7 g 0     citalopram (CELEXA) 20 MG tablet Take 1 tablet (20 mg) by mouth daily 90 tablet 3     fluticasone (FLONASE) 50 MCG/ACT nasal spray Spray 1 spray into both nostrils daily 16 g 3     lisdexamfetamine (VYVANSE) 50 MG capsule Take 1 capsule (50 mg) by mouth every morning 30 capsule 0     albuterol (PROVENTIL) (2.5 MG/3ML) 0.083% neb solution Take 1 vial (2.5 mg) by nebulization every 6 hours as needed 25 vial 0     cyclobenzaprine (FLEXERIL) 10 MG tablet Take 1 tablet (10 mg) by mouth 3 times daily as needed for muscle spasms 30 tablet 0     Social History     Tobacco Use     Smoking status: Current Some Day Smoker     Packs/day: 0.25     Years: 0.50     Pack years: 0.12     Types: Cigarettes     Smokeless tobacco: Former User      Tobacco comment: Trying to stop---Hasn't smoked for a week.   Substance Use Topics     Alcohol use: Yes     Comment: minimal       ROS:  Review of systems negative except as stated above.    OBJECTIVE:  BP (!) 140/90   Pulse 87   Temp 97.8  F (36.6  C) (Tympanic)   Resp 20   Wt (!) 151 kg (333 lb)   SpO2 97%   BMI 57.16 kg/m    Back examination: Back symmetric, no curvature. ROM normal. No CVA tenderness., positive findings: limitation of motion - flexion: Marked, extension: Marked, rotation: Mild and lateral bending: Mild, paraspinal muscle spasm  [unfilled] leg raise test: positive seated position at 60 degrees   GENERAL APPEARANCE: healthy, alert and no distress  RESP: lungs clear to auscultation - no rales, rhonchi or wheezes  CV: regular rates and rhythm, normal S1 S2, no murmur noted  ABDOMEN:  soft, nontender, no HSM or masses and bowel sounds normal  NEURO: Normal strength and tone with no weakness or sensory deficit noted, reflexes normal   SKIN: no suspicious lesions or rashes    assessment/plan:  (M54.41) Acute right-sided low back pain with right-sided sciatica  (primary encounter diagnosis)  Comment:   Plan: HYDROcodone-acetaminophen (NORCO) 5-325 MG         tablet, : methylPREDNISolone         (MEDROL DOSEPAK) 4 MG tablet therapy pack,         DISCONTINUED: cyclobenzaprine (FLEXERIL) 5 MG         tablet          Ongoing back issues  Seeing PT and Neurosurgery coming up.  Med as directed and ice and heat to affected area.  Red flag signs discussed ;. Follow-up with PCP as needed if sx worsen or new sx develop

## 2022-01-20 ENCOUNTER — APPOINTMENT (OUTPATIENT)
Dept: GENERAL RADIOLOGY | Facility: CLINIC | Age: 32
End: 2022-01-20
Attending: NEUROLOGICAL SURGERY
Payer: COMMERCIAL

## 2022-01-20 ENCOUNTER — HOSPITAL ENCOUNTER (OUTPATIENT)
Facility: CLINIC | Age: 32
Discharge: HOME | End: 2022-01-20
Attending: NEUROLOGICAL SURGERY | Admitting: NEUROLOGICAL SURGERY
Payer: COMMERCIAL

## 2022-01-20 ENCOUNTER — ANESTHESIA (OUTPATIENT)
Dept: SURGERY | Facility: CLINIC | Age: 32
End: 2022-01-20
Payer: COMMERCIAL

## 2022-01-20 VITALS
OXYGEN SATURATION: 94 % | TEMPERATURE: 97.2 F | DIASTOLIC BLOOD PRESSURE: 91 MMHG | HEIGHT: 64 IN | SYSTOLIC BLOOD PRESSURE: 126 MMHG | WEIGHT: 293 LBS | BODY MASS INDEX: 50.02 KG/M2 | RESPIRATION RATE: 18 BRPM | HEART RATE: 95 BPM

## 2022-01-20 DIAGNOSIS — M51.16 LUMBAR DISC HERNIATION WITH RADICULOPATHY: Primary | ICD-10-CM

## 2022-01-20 LAB — HCG SERPL QL: NEGATIVE

## 2022-01-20 PROCEDURE — 370N000017 HC ANESTHESIA TECHNICAL FEE, PER MIN: Performed by: NEUROLOGICAL SURGERY

## 2022-01-20 PROCEDURE — 2894A VOIDCORRECT: CPT | Mod: AS | Performed by: PHYSICIAN ASSISTANT

## 2022-01-20 PROCEDURE — 36415 COLL VENOUS BLD VENIPUNCTURE: CPT | Performed by: NEUROLOGICAL SURGERY

## 2022-01-20 PROCEDURE — 258N000003 HC RX IP 258 OP 636: Performed by: NURSE ANESTHETIST, CERTIFIED REGISTERED

## 2022-01-20 PROCEDURE — 250N000013 HC RX MED GY IP 250 OP 250 PS 637: Performed by: ANESTHESIOLOGY

## 2022-01-20 PROCEDURE — 250N000009 HC RX 250: Performed by: NEUROLOGICAL SURGERY

## 2022-01-20 PROCEDURE — 250N000013 HC RX MED GY IP 250 OP 250 PS 637: Performed by: PHYSICIAN ASSISTANT

## 2022-01-20 PROCEDURE — 272N000001 HC OR GENERAL SUPPLY STERILE: Performed by: NEUROLOGICAL SURGERY

## 2022-01-20 PROCEDURE — 999N000141 HC STATISTIC PRE-PROCEDURE NURSING ASSESSMENT: Performed by: NEUROLOGICAL SURGERY

## 2022-01-20 PROCEDURE — 69990 MICROSURGERY ADD-ON: CPT | Mod: 59 | Performed by: NEUROLOGICAL SURGERY

## 2022-01-20 PROCEDURE — 63030 LAMOT DCMPRN NRV RT 1 LMBR: CPT | Mod: 22 | Performed by: NEUROLOGICAL SURGERY

## 2022-01-20 PROCEDURE — 84703 CHORIONIC GONADOTROPIN ASSAY: CPT | Performed by: NEUROLOGICAL SURGERY

## 2022-01-20 PROCEDURE — 250N000011 HC RX IP 250 OP 636: Performed by: NEUROLOGICAL SURGERY

## 2022-01-20 PROCEDURE — 250N000011 HC RX IP 250 OP 636: Performed by: PHYSICIAN ASSISTANT

## 2022-01-20 PROCEDURE — 250N000011 HC RX IP 250 OP 636: Performed by: NURSE ANESTHETIST, CERTIFIED REGISTERED

## 2022-01-20 PROCEDURE — 63030 LAMOT DCMPRN NRV RT 1 LMBR: CPT | Mod: AS | Performed by: PHYSICIAN ASSISTANT

## 2022-01-20 PROCEDURE — 710N000009 HC RECOVERY PHASE 1, LEVEL 1, PER MIN: Performed by: NEUROLOGICAL SURGERY

## 2022-01-20 PROCEDURE — 710N000012 HC RECOVERY PHASE 2, PER MINUTE: Performed by: NEUROLOGICAL SURGERY

## 2022-01-20 PROCEDURE — 250N000005 HC OR RX SURGIFLO HEMOSTATIC MATRIX 10ML 199102S OPNP: Performed by: NEUROLOGICAL SURGERY

## 2022-01-20 PROCEDURE — 250N000025 HC SEVOFLURANE, PER MIN: Performed by: NEUROLOGICAL SURGERY

## 2022-01-20 PROCEDURE — 250N000009 HC RX 250: Performed by: NURSE ANESTHETIST, CERTIFIED REGISTERED

## 2022-01-20 PROCEDURE — 360N000084 HC SURGERY LEVEL 4 W/ FLUORO, PER MIN: Performed by: NEUROLOGICAL SURGERY

## 2022-01-20 PROCEDURE — 999N000179 XR SURGERY CARM FLUORO LESS THAN 5 MIN W STILLS

## 2022-01-20 RX ORDER — HYDROMORPHONE HCL IN WATER/PF 6 MG/30 ML
0.2 PATIENT CONTROLLED ANALGESIA SYRINGE INTRAVENOUS EVERY 5 MIN PRN
Status: DISCONTINUED | OUTPATIENT
Start: 2022-01-20 | End: 2022-01-20 | Stop reason: HOSPADM

## 2022-01-20 RX ORDER — SODIUM CHLORIDE, SODIUM LACTATE, POTASSIUM CHLORIDE, CALCIUM CHLORIDE 600; 310; 30; 20 MG/100ML; MG/100ML; MG/100ML; MG/100ML
INJECTION, SOLUTION INTRAVENOUS CONTINUOUS PRN
Status: DISCONTINUED | OUTPATIENT
Start: 2022-01-20 | End: 2022-01-20

## 2022-01-20 RX ORDER — FENTANYL CITRATE 0.05 MG/ML
25 INJECTION, SOLUTION INTRAMUSCULAR; INTRAVENOUS
Status: CANCELLED | OUTPATIENT
Start: 2022-01-20

## 2022-01-20 RX ORDER — OXYCODONE HYDROCHLORIDE 5 MG/1
5 TABLET ORAL EVERY 4 HOURS PRN
Status: DISCONTINUED | OUTPATIENT
Start: 2022-01-20 | End: 2022-01-20 | Stop reason: HOSPADM

## 2022-01-20 RX ORDER — CLINDAMYCIN PHOSPHATE 900 MG/50ML
900 INJECTION, SOLUTION INTRAVENOUS SEE ADMIN INSTRUCTIONS
Status: DISCONTINUED | OUTPATIENT
Start: 2022-01-20 | End: 2022-01-20 | Stop reason: HOSPADM

## 2022-01-20 RX ORDER — ONDANSETRON 2 MG/ML
4 INJECTION INTRAMUSCULAR; INTRAVENOUS EVERY 30 MIN PRN
Status: DISCONTINUED | OUTPATIENT
Start: 2022-01-20 | End: 2022-01-20 | Stop reason: HOSPADM

## 2022-01-20 RX ORDER — ONDANSETRON 2 MG/ML
INJECTION INTRAMUSCULAR; INTRAVENOUS PRN
Status: DISCONTINUED | OUTPATIENT
Start: 2022-01-20 | End: 2022-01-20

## 2022-01-20 RX ORDER — HYDROXYZINE HYDROCHLORIDE 25 MG/1
25 TABLET, FILM COATED ORAL
Status: DISCONTINUED | OUTPATIENT
Start: 2022-01-20 | End: 2022-01-20 | Stop reason: HOSPADM

## 2022-01-20 RX ORDER — NEOSTIGMINE METHYLSULFATE 1 MG/ML
VIAL (ML) INJECTION PRN
Status: DISCONTINUED | OUTPATIENT
Start: 2022-01-20 | End: 2022-01-20

## 2022-01-20 RX ORDER — FENTANYL CITRATE 50 UG/ML
INJECTION, SOLUTION INTRAMUSCULAR; INTRAVENOUS PRN
Status: DISCONTINUED | OUTPATIENT
Start: 2022-01-20 | End: 2022-01-20

## 2022-01-20 RX ORDER — OXYCODONE HYDROCHLORIDE 5 MG/1
5 TABLET ORAL EVERY 6 HOURS PRN
Qty: 40 TABLET | Refills: 0 | Status: SHIPPED | OUTPATIENT
Start: 2022-01-20 | End: 2022-04-06

## 2022-01-20 RX ORDER — IBUPROFEN 800 MG/1
800 TABLET, FILM COATED ORAL EVERY 8 HOURS PRN
Qty: 42 TABLET | Refills: 0 | Status: SHIPPED | OUTPATIENT
Start: 2022-01-20 | End: 2022-02-03

## 2022-01-20 RX ORDER — MAGNESIUM HYDROXIDE 1200 MG/15ML
LIQUID ORAL PRN
Status: DISCONTINUED | OUTPATIENT
Start: 2022-01-20 | End: 2022-01-20 | Stop reason: HOSPADM

## 2022-01-20 RX ORDER — FENTANYL CITRATE 0.05 MG/ML
25 INJECTION, SOLUTION INTRAMUSCULAR; INTRAVENOUS EVERY 5 MIN PRN
Status: DISCONTINUED | OUTPATIENT
Start: 2022-01-20 | End: 2022-01-20 | Stop reason: HOSPADM

## 2022-01-20 RX ORDER — SODIUM CHLORIDE, SODIUM LACTATE, POTASSIUM CHLORIDE, CALCIUM CHLORIDE 600; 310; 30; 20 MG/100ML; MG/100ML; MG/100ML; MG/100ML
INJECTION, SOLUTION INTRAVENOUS CONTINUOUS
Status: DISCONTINUED | OUTPATIENT
Start: 2022-01-20 | End: 2022-01-20 | Stop reason: HOSPADM

## 2022-01-20 RX ORDER — SENNA AND DOCUSATE SODIUM 50; 8.6 MG/1; MG/1
1 TABLET, FILM COATED ORAL AT BEDTIME
Qty: 20 TABLET | Refills: 0 | Status: SHIPPED | OUTPATIENT
Start: 2022-01-20 | End: 2024-09-09

## 2022-01-20 RX ORDER — ONDANSETRON 4 MG/1
4 TABLET, ORALLY DISINTEGRATING ORAL EVERY 30 MIN PRN
Status: DISCONTINUED | OUTPATIENT
Start: 2022-01-20 | End: 2022-01-20 | Stop reason: HOSPADM

## 2022-01-20 RX ORDER — DEXAMETHASONE SODIUM PHOSPHATE 4 MG/ML
INJECTION, SOLUTION INTRA-ARTICULAR; INTRALESIONAL; INTRAMUSCULAR; INTRAVENOUS; SOFT TISSUE PRN
Status: DISCONTINUED | OUTPATIENT
Start: 2022-01-20 | End: 2022-01-20

## 2022-01-20 RX ORDER — IBUPROFEN 400 MG/1
800 TABLET, FILM COATED ORAL
Status: DISCONTINUED | OUTPATIENT
Start: 2022-01-20 | End: 2022-01-20 | Stop reason: HOSPADM

## 2022-01-20 RX ORDER — OXYCODONE HYDROCHLORIDE 5 MG/1
10 TABLET ORAL
Status: DISCONTINUED | OUTPATIENT
Start: 2022-01-20 | End: 2022-01-20 | Stop reason: HOSPADM

## 2022-01-20 RX ORDER — METHOCARBAMOL 750 MG/1
750 TABLET, FILM COATED ORAL 4 TIMES DAILY PRN
Qty: 40 TABLET | Refills: 0 | Status: SHIPPED | OUTPATIENT
Start: 2022-01-20 | End: 2023-03-30

## 2022-01-20 RX ORDER — LIDOCAINE HYDROCHLORIDE 20 MG/ML
INJECTION, SOLUTION INFILTRATION; PERINEURAL PRN
Status: DISCONTINUED | OUTPATIENT
Start: 2022-01-20 | End: 2022-01-20

## 2022-01-20 RX ORDER — CLINDAMYCIN PHOSPHATE 900 MG/50ML
900 INJECTION, SOLUTION INTRAVENOUS
Status: COMPLETED | OUTPATIENT
Start: 2022-01-20 | End: 2022-01-20

## 2022-01-20 RX ORDER — MEPERIDINE HYDROCHLORIDE 25 MG/ML
12.5 INJECTION INTRAMUSCULAR; INTRAVENOUS; SUBCUTANEOUS
Status: DISCONTINUED | OUTPATIENT
Start: 2022-01-20 | End: 2022-01-20 | Stop reason: HOSPADM

## 2022-01-20 RX ORDER — PROPOFOL 10 MG/ML
INJECTION, EMULSION INTRAVENOUS PRN
Status: DISCONTINUED | OUTPATIENT
Start: 2022-01-20 | End: 2022-01-20

## 2022-01-20 RX ORDER — METHOCARBAMOL 750 MG/1
750 TABLET, FILM COATED ORAL
Status: COMPLETED | OUTPATIENT
Start: 2022-01-20 | End: 2022-01-20

## 2022-01-20 RX ORDER — GLYCOPYRROLATE 0.2 MG/ML
INJECTION, SOLUTION INTRAMUSCULAR; INTRAVENOUS PRN
Status: DISCONTINUED | OUTPATIENT
Start: 2022-01-20 | End: 2022-01-20

## 2022-01-20 RX ADMIN — SODIUM CHLORIDE, POTASSIUM CHLORIDE, SODIUM LACTATE AND CALCIUM CHLORIDE: 600; 310; 30; 20 INJECTION, SOLUTION INTRAVENOUS at 17:18

## 2022-01-20 RX ADMIN — FENTANYL CITRATE 50 MCG: 50 INJECTION, SOLUTION INTRAMUSCULAR; INTRAVENOUS at 16:22

## 2022-01-20 RX ADMIN — SODIUM CHLORIDE, POTASSIUM CHLORIDE, SODIUM LACTATE AND CALCIUM CHLORIDE: 600; 310; 30; 20 INJECTION, SOLUTION INTRAVENOUS at 16:22

## 2022-01-20 RX ADMIN — METHOCARBAMOL 750 MG: 750 TABLET ORAL at 19:36

## 2022-01-20 RX ADMIN — LIDOCAINE HYDROCHLORIDE 60 MG: 20 INJECTION, SOLUTION INFILTRATION; PERINEURAL at 16:28

## 2022-01-20 RX ADMIN — DEXMEDETOMIDINE HYDROCHLORIDE 0.4 MCG/KG/HR: 100 INJECTION, SOLUTION INTRAVENOUS at 16:53

## 2022-01-20 RX ADMIN — NEOSTIGMINE METHYLSULFATE 5 MG: 1 INJECTION, SOLUTION INTRAVENOUS at 17:55

## 2022-01-20 RX ADMIN — ONDANSETRON 4 MG: 2 INJECTION INTRAMUSCULAR; INTRAVENOUS at 17:53

## 2022-01-20 RX ADMIN — GLYCOPYRROLATE 0.8 MG: 0.2 INJECTION, SOLUTION INTRAMUSCULAR; INTRAVENOUS at 17:55

## 2022-01-20 RX ADMIN — SODIUM CHLORIDE, SODIUM LACTATE, POTASSIUM CHLORIDE, CALCIUM CHLORIDE: 600; 310; 30; 20 INJECTION, SOLUTION INTRAVENOUS at 16:32

## 2022-01-20 RX ADMIN — SUCCINYLCHOLINE CHLORIDE 160 MG: 20 INJECTION, SOLUTION INTRAMUSCULAR; INTRAVENOUS; PARENTERAL at 16:28

## 2022-01-20 RX ADMIN — ONDANSETRON 4 MG: 2 INJECTION INTRAMUSCULAR; INTRAVENOUS at 17:06

## 2022-01-20 RX ADMIN — HYDROMORPHONE HYDROCHLORIDE 0.5 MG: 1 INJECTION, SOLUTION INTRAMUSCULAR; INTRAVENOUS; SUBCUTANEOUS at 17:21

## 2022-01-20 RX ADMIN — FENTANYL CITRATE 50 MCG: 50 INJECTION, SOLUTION INTRAMUSCULAR; INTRAVENOUS at 16:34

## 2022-01-20 RX ADMIN — DEXAMETHASONE SODIUM PHOSPHATE 4 MG: 4 INJECTION, SOLUTION INTRA-ARTICULAR; INTRALESIONAL; INTRAMUSCULAR; INTRAVENOUS; SOFT TISSUE at 16:35

## 2022-01-20 RX ADMIN — ROCURONIUM BROMIDE 50 MG: 50 INJECTION, SOLUTION INTRAVENOUS at 16:43

## 2022-01-20 RX ADMIN — MIDAZOLAM 2 MG: 1 INJECTION INTRAMUSCULAR; INTRAVENOUS at 16:22

## 2022-01-20 RX ADMIN — OXYCODONE HYDROCHLORIDE 5 MG: 5 TABLET ORAL at 19:36

## 2022-01-20 RX ADMIN — CLINDAMYCIN PHOSPHATE 900 MG: 900 INJECTION, SOLUTION INTRAVENOUS at 13:55

## 2022-01-20 RX ADMIN — PROPOFOL 200 MG: 10 INJECTION, EMULSION INTRAVENOUS at 16:28

## 2022-01-20 ASSESSMENT — LIFESTYLE VARIABLES: TOBACCO_USE: 1

## 2022-01-20 ASSESSMENT — MIFFLIN-ST. JEOR: SCORE: 2190.07

## 2022-01-20 NOTE — ANESTHESIA PREPROCEDURE EVALUATION
Anesthesia Pre-Procedure Evaluation    Patient: Cheryle Vazquez   MRN: [id number] : 1990        Preoperative Diagnosis: Lumbar disc herniation with radiculopathy [M51.16]  Morbid obesity with BMI of 50.0-59.9, adult (H) [E66.01, Z68.43]    Procedure : Procedure(s):  RIght Lumbar 5 to Sacral 1 microdiscectomy          Past Medical History:   Diagnosis Date     Acid reflux      ADHD (attention deficit hyperactivity disorder)      Depressive disorder      Eczema       Past Surgical History:   Procedure Laterality Date     LIGATE ARTERY ETHMOID        Allergies   Allergen Reactions     Amoxicillin Other (See Comments)     Caused yeast infection  Patient does not want to take Amoxicillin as it causes a yeast infection.  She has requested it be added to her allergy list.     Azithromycin Rash     Sulfamethoxazole-Trimethoprim Palpitations and Rash     Possible allergic reaction     Valacyclovir Palpitations and Rash     Possible allergic reaction      Social History     Tobacco Use     Smoking status: Current Some Day Smoker     Packs/day: 0.25     Years: 0.50     Pack years: 0.12     Types: Cigarettes     Smokeless tobacco: Former User     Tobacco comment: Trying to stop---Hasn't smoked for a week.   Substance Use Topics     Alcohol use: Yes     Comment: minimal      Wt Readings from Last 1 Encounters:   22 149 kg (328 lb 8 oz)        Anesthesia Evaluation   Pt has not had prior anesthetic         ROS/MED HX  ENT/Pulmonary:     (+) MARIA E risk factors, tobacco use, Current use, asthma  (-) sleep apnea   Neurologic: Comment: Lumbar radiculopathy , ADHD, insomnia       Cardiovascular:       METS/Exercise Tolerance:     Hematologic:       Musculoskeletal:       GI/Hepatic:     (+) GERD,     Renal/Genitourinary:       Endo:     (+) Obesity,     Psychiatric/Substance Use:     (+) psychiatric history anxiety and depression     Infectious Disease:       Malignancy:       Other:            Physical  Exam    Airway        Mallampati: III   TM distance: > 3 FB   Neck ROM: full   Mouth opening: > 3 cm    Respiratory Devices and Support         Dental       (+) caps      Cardiovascular          Rhythm and rate: regular     Pulmonary           breath sounds clear to auscultation           OUTSIDE LABS:  CBC:   Lab Results   Component Value Date    WBC 5.2 12/31/2021    WBC 7.6 11/04/2019    HGB 13.8 12/31/2021    HGB 11.9 05/29/2013    HCT 41.6 12/31/2021    HCT 36.4 05/29/2013     12/31/2021     05/29/2013     BMP:   Lab Results   Component Value Date     12/31/2021     05/29/2013    POTASSIUM 4.1 12/31/2021    POTASSIUM 3.6 05/29/2013    CHLORIDE 109 12/31/2021    CHLORIDE 104 05/29/2013    CO2 26 12/31/2021    CO2 24 05/29/2013    BUN 13 12/31/2021    BUN 12 05/29/2013    CR 0.83 12/31/2021    CR 0.80 05/29/2013     (H) 12/31/2021     (H) 11/01/2019     COAGS: No results found for: PTT, INR, FIBR  POC:   Lab Results   Component Value Date    HCG Negative 09/16/2019    HCGS Negative 01/20/2022     HEPATIC:   Lab Results   Component Value Date    ALBUMIN 3.8 (L) 05/29/2013    PROTTOTAL 6.7 (L) 05/29/2013    ALT 30 05/29/2013    AST 28 05/29/2013    ALKPHOS 75 05/29/2013    BILITOTAL 0.4 05/29/2013     OTHER:   Lab Results   Component Value Date    JUAN 8.9 12/31/2021    TSH 2.07 05/29/2013       Anesthesia Plan    ASA Status:  3      Anesthesia Type: General.     - Airway: ETT   Induction: RSI.      Techniques and Equipment:     - Airway: Video-Laryngoscope     - Lines/Monitors: 2nd IV     Consents    Anesthesia Plan(s) and associated risks, benefits, and realistic alternatives discussed. Questions answered and patient/representative(s) expressed understanding.    - Discussed:     - Discussed with:  Patient      - Extended Intubation/Ventilatory Support Discussed: Yes.         Postoperative Care       PONV prophylaxis: Ondansetron (or other 5HT-3), Dexamethasone or Solumedrol      Comments:    Other Comments: Glidescope, intubation ramp, Infusions: phenylephrine, remifentanil, demed-set to  IBW.         Prior to Admission medications    Medication Sig Start Date End Date Taking? Authorizing Provider   albuterol (PROAIR HFA/PROVENTIL HFA/VENTOLIN HFA) 108 (90 Base) MCG/ACT inhaler INHALE 1 TO 2 PUFFS INTO THE LUNGS EVERY 6 HOURS 10/29/21  Yes Marely Wong PA-C   albuterol (PROVENTIL) (2.5 MG/3ML) 0.083% neb solution Take 1 vial (2.5 mg) by nebulization every 6 hours as needed 12/17/19  Yes Coral Dejesus PA-C   citalopram (CELEXA) 20 MG tablet Take 1 tablet (20 mg) by mouth daily 6/19/20  Yes Marely Wong PA-C   fluticasone (FLONASE) 50 MCG/ACT nasal spray Spray 1 spray into both nostrils daily 9/30/21  Yes Marely Wong PA-C   cyclobenzaprine (FLEXERIL) 10 MG tablet Take 1 tablet (10 mg) by mouth 3 times daily as needed for muscle spasms 12/29/21   Dimple Parmar, NP   lisdexamfetamine (VYVANSE) 50 MG capsule Take 1 capsule (50 mg) by mouth every morning 9/30/21   Marely Wong PA-C     Current Facility-Administered Medications Ordered in Epic   Medication Dose Route Frequency Last Rate Last Admin     clindamycin (CLEOCIN) infusion 900 mg  900 mg Intravenous Pre-Op/Pre-procedure x 1 dose 50 mL/hr at 01/20/22 1355 900 mg at 01/20/22 1355     clindamycin (CLEOCIN) infusion 900 mg  900 mg Intravenous See Admin Instructions         No current [facility]-ordered outpatient medications on file.       No results for input(s): ABO, RH in the last 15213 hours.  Recent Labs   Lab Test 09/16/19  1057   HCG Negative     No results found for this or any previous visit (from the past 744 hour(s)).         Jessica Hensley MD

## 2022-01-20 NOTE — ANESTHESIA PROCEDURE NOTES
Airway       Patient location during procedure: OR       Procedure Start/Stop Times: 1/20/2022 4:31 PM  Staff -        Anesthesiologist:  Jessica Hensley MD       CRNA: Leanne Hooker APRN CRNA       Performed By: anesthesiologist and CRNA  Consent for Airway        Urgency: elective  Indications and Patient Condition       Indications for airway management: fransisco-procedural       Induction type:intravenous       Mask difficulty assessment: 2 - vent by mask + OA or adjuvant +/- NMBA    Final Airway Details       Final airway type: endotracheal airway       Successful airway: ETT - single  Endotracheal Airway Details        ETT size (mm): 7.0       Successful intubation technique: video laryngoscopy       VL Blade Size: Glidescope 3       Grade View of Cords: 1       Adjucts: stylet       Position: Right       Measured from: lips       Secured at (cm): 22       Bite block used: Soft    Post intubation assessment        Placement verified by: capnometry, equal breath sounds and chest rise        Number of attempts at approach: 1       Secured with: silk tape       Ease of procedure: easy       Dentition: Intact and Unchanged

## 2022-01-21 ENCOUNTER — TELEPHONE (OUTPATIENT)
Dept: NEUROSURGERY | Facility: CLINIC | Age: 32
End: 2022-01-21
Payer: COMMERCIAL

## 2022-01-21 NOTE — ANESTHESIA CARE TRANSFER NOTE
Patient: Cheryle Vazquez    Procedure: Procedure(s):  RIght Lumbar 5 to Sacral 1 microdiscectomy       Diagnosis: Lumbar disc herniation with radiculopathy [M51.16]  Morbid obesity with BMI of 50.0-59.9, adult (H) [E66.01, Z68.43]  Diagnosis Additional Information: No value filed.    Anesthesia Type:   General     Note:    Oropharynx: oropharynx clear of all foreign objects  Level of Consciousness: awake  Oxygen Supplementation: face mask  Level of Supplemental Oxygen (L/min / FiO2): 8  Independent Airway: airway patency satisfactory and stable  Dentition: dentition unchanged  Vital Signs Stable: post-procedure vital signs reviewed and stable  Report to RN Given: handoff report given  Patient transferred to: PACU    Handoff Report: Identifed the Patient, Identified the Reponsible Provider, Reviewed the pertinent medical history, Discussed the surgical course, Reviewed Intra-OP anesthesia mangement and issues during anesthesia, Set expectations for post-procedure period and Allowed opportunity for questions and acknowledgement of understanding      Vitals:  Vitals Value Taken Time   BP     Temp     Pulse 104 01/20/22 1835   Resp 14 01/20/22 1835   SpO2 94 % 01/20/22 1835   Vitals shown include unvalidated device data.    Electronically Signed By: KENNA Gomez CRNA  January 20, 2022  6:36 PM

## 2022-01-21 NOTE — PROGRESS NOTES
A&O. VSS on RA. Tolerating juice and crackers. Oxycodone and robaxin given prior to discharge. Dressed and up to recliner chair. Discharge instructions called to mom Cyndie. Pt and mom with no additional questions or concerns. IV removed. Discharged to home with AVS, paper RX and all personal belongings.

## 2022-01-21 NOTE — ANESTHESIA POSTPROCEDURE EVALUATION
Patient: Cheryle Vazquez    Procedure: Procedure(s):  RIght Lumbar 5 to Sacral 1 microdiscectomy       Diagnosis:Lumbar disc herniation with radiculopathy [M51.16]  Morbid obesity with BMI of 50.0-59.9, adult (H) [E66.01, Z68.43]  Diagnosis Additional Information: No value filed.    Anesthesia Type:  General    Note:  Disposition: Outpatient   Postop Pain Control: Uneventful            Sign Out: Well controlled pain   PONV: No   Neuro/Psych: Uneventful            Sign Out: Acceptable/Baseline neuro status   Airway/Respiratory: Uneventful            Sign Out: Acceptable/Baseline resp. status   CV/Hemodynamics: Uneventful            Sign Out: Acceptable CV status   Other NRE: NONE   DID A NON-ROUTINE EVENT OCCUR? No           Last vitals:  Vitals Value Taken Time   BP 97/56 01/20/22 2000   Temp     Pulse 105 01/20/22 2003   Resp 30 01/20/22 2003   SpO2 92 % 01/20/22 2003   Vitals shown include unvalidated device data.    Electronically Signed By: Phillip Hodges MD  January 20, 2022  8:59 PM

## 2022-01-21 NOTE — TELEPHONE ENCOUNTER
Post-Op Call    DOS: 1/20/22  Surgery: Right Lumbar 5 to Sacral 1 microdiscectomy  Surgeon: Dr. Hwang    Called patient for post-operative follow-up.    Patient rates pain 5-6/10. States pain does not radiate and it is incisional. Pt is taking her mediations as prescribed. 1 tab of oxy q6, robaxin 4x/day, and ibuprofen. Discussed weaning from pain medication as pain improves. Provided education about maximum acetaminophen dose in 24 hours from all sources.     Patient is walking without difficulty. Encouraged short, frequent walks as tolerated.     Patient has not had a bowel movement since surgery. Discussed reasons of constipation after surgery and reinforced treatment options.     Patient was not sent home with a dressing. Patient denies any signs or symptoms of infection. Incision care reinforced.     Post op restrictions reviewed with patient and pt verbalizes understanding.     Reviewed follow up appointments and clinic contact information. Patient will call with any questions or concerns.

## 2022-01-21 NOTE — BRIEF OP NOTE
[facility]    Brief Operative Note    Pre-operative diagnosis: Lumbar disc herniation with radiculopathy [M51.16]  Morbid obesity with BMI of 50.0-59.9, adult (H) [E66.01, Z68.43]  Post-operative diagnosis Same as pre-operative diagnosis    Procedure: Procedure(s):  RIght Lumbar 5 to Sacral 1 microdiscectomy  Surgeon: Surgeon(s) and Role:     * Andrzej Hwang MD - Primary     * Nikolai Dumont PA-C - Assisting  Anesthesia: General   Estimated Blood Loss: Less than 50 ml    Drains: None  Specimens: * No specimens in log *  Findings:   None.  Complications: None.  Implants: * No implants in log *

## 2022-01-21 NOTE — DISCHARGE INSTRUCTIONS
Same Day Surgery Discharge Instructions for  Sedation and General Anesthesia       It's not unusual to feel dizzy, light-headed or faint for up to 24 hours after surgery or while taking pain medication.  If you have these symptoms: sit for a few minutes before standing and have someone assist you when you get up to walk or use the bathroom.      You should rest and relax for the next 24 hours. We recommend you make arrangements to have an adult stay with you for at least 24 hours after your discharge.  Avoid hazardous and strenuous activity.      DO NOT DRIVE any vehicle or operate mechanical equipment for 24 hours following the end of your surgery.  Even though you may feel normal, your reactions may be affected by the medication you have received.      Do not drink alcoholic beverages for 24 hours following surgery.       Slowly progress to your regular diet as you feel able. It's not unusual to feel nauseated and/or vomit after receiving anesthesia.  If you develop these symptoms, drink clear liquids (apple juice, ginger ale, broth, 7-up, etc. ) until you feel better.  If your nausea and vomiting persists for 24 hours, please notify your surgeon.        All narcotic pain medications, along with inactivity and anesthesia, can cause constipation. Drinking plenty of liquids and increasing fiber intake will help.      For any questions of a medical nature, call your surgeon.      Do not make important decisions for 24 hours.      If you had general anesthesia, you may have a sore throat for a couple of days related to the breathing tube used during surgery.  You may use Cepacol lozenges to help with this discomfort.  If it worsens or if you develop a fever, contact your surgeon.       If you feel your pain is not well managed with the pain medications prescribed by your surgeon, please contact your surgeon's office to let them know so they can address your concerns.       CoVid 19 Information    We want to give you  information regarding Covid. Please consult your primary care provider with any questions you might have.     Patient who have symptoms (cough, fever, or shortness of breath), need to isolate for 7 days from when symptoms started OR 72 hours after fever resolves (without fever reducing medications) AND improvement of respiratory symptoms (whichever is longer).      Isolate yourself at home (in own room/own bathroom if possible)    Do Not allow any visitors    Do Not go to work or school    Do Not go to Taoism,  centers, shopping, or other public places.    Do Not shake hands.    Avoid close and intimate contact with others (hugging, kissing).    Follow CDC recommendations for household cleaning of frequently touched services.     After the initial 7 days, continue to isolate yourself from household members as much as possible. To continue decrease the risk of community spread and exposure, you and any members of your household should limit activities in public for 14 days after starting home isolation.     You can reference the following CDC link for helpful home isolation/care tips:  https://www.cdc.gov/coronavirus/2019-ncov/downloads/10Things.pdf    Protect Others:    Cover Your Mouth and Nose with a mask, disposable tissue or wash cloth to avoid spreading germs to others.    Wash your hands and face frequently with soap and water    Call Your Primary Doctor If: Breathing difficulty develops or you become worse.    For more information about COVID19 and options for caring for yourself at home, please visit the CDC website at https://www.cdc.gov/coronavirus/2019-ncov/about/steps-when-sick.html  For more options for care at [facility], please visit our website at [email or web address]      **If you have questions or concerns about your procedure,   call Dr. Demetrio Hwang at [phone]**

## 2022-01-21 NOTE — OP NOTE
Procedure Date: 01/20/2022    PREOPERATIVE DIAGNOSES:  1.  Right L5-S1 herniated disk with radiculopathy.  2.  Morbid obesity with BMI of 56.    POSTOPERATIVE DIAGNOSES:  1.  Right L5-S1 herniated disk with radiculopathy.  2.  Morbid obesity with BMI of 56.    DESCRIPTION OF PROCEDURE:  Right L5-S1 microdiskectomy.    SURGEON:  Andrzej Hwang MD  .   ASSISTANT:  Nikolai Dumont PA-C.    ANESTHESIA:  General endotracheal anesthesia.    ESTIMATED BLOOD LOSS:  25 mL.    INDICATIONS FOR PROCEDURE:  The patient is a 31-year-old female with back and right lower extremity symptoms and a large herniated disk on the right at L5-S1.  She was brought for lumbar microdiskectomy.  Please note that given her morbid obesity, the surgery was more difficult than usual by approximately 25%. Please note that Nikolai Dumont PA-C's assistance was needed for positioning, retraction, suctioning, and closure.     DESCRIPTION OF PROCEDURE:  The patient was brought to the operating room.  General endotracheal anesthesia was induced.  The patient was rolled in the prone position on the Yifan frame.  The back was prepped and draped in sterile fashion.  C-arm fluoroscopy was used to localize the appropriate level and then a midline exposure was performed down to the spinous processes and then a right sided exposure was performed.  The level was again confirmed with fluoroscopy.  The microscope was sterilely draped and brought into the field and the Versa-Trac retractor placed.  The laminotomy and medial facetectomy was performed at L5-S1 and the ligamentum flavum elevated.  The thecal sac underneath was identified and the nerve root as well.  The nerve root was gently retracted medially and the large herniated disk fragment was identified.  The nerve root retractor was placed and the disk was incised with a 15 blade and multiple large fragments were obtained.  A dissection in the epidural space to remove any remainder of the fragments  was performed until the nerve was noted to be well decompressed. At this point, then hemostasis was achieved.  Fascia was closed with 0 interrupted Vicryl, 2-0 inverted interrupted Vicryl was used for the subcutaneous layer, 4-0 subcuticular Monocryl was used for skin and Exofin placed as a dressing.  The patient was awakened, extubated, and taken to the recovery room in good condition.    Andrzej Hwang MD        D: 2022   T: 2022   MT: [id number]    Name:     JINSHEFALITONANDREA M.  MRN:      [id number]        Account:        [id number]   :      1990           Procedure Date: 2022     Document: [id number]

## 2022-02-04 ENCOUNTER — OFFICE VISIT (OUTPATIENT)
Dept: NEUROSURGERY | Facility: CLINIC | Age: 32
End: 2022-02-04
Payer: COMMERCIAL

## 2022-02-04 VITALS — HEART RATE: 63 BPM | SYSTOLIC BLOOD PRESSURE: 142 MMHG | OXYGEN SATURATION: 98 % | DIASTOLIC BLOOD PRESSURE: 77 MMHG

## 2022-02-04 DIAGNOSIS — Z98.890 S/P LUMBAR MICRODISCECTOMY: Primary | ICD-10-CM

## 2022-02-04 PROBLEM — M51.16 LUMBAR DISC HERNIATION WITH RADICULOPATHY: Status: RESOLVED | Noted: 2021-11-22 | Resolved: 2022-02-04

## 2022-02-04 PROCEDURE — 99024 POSTOP FOLLOW-UP VISIT: CPT | Performed by: PHYSICIAN ASSISTANT

## 2022-02-04 ASSESSMENT — PAIN SCALES - GENERAL: PAINLEVEL: NO PAIN (1)

## 2022-02-04 NOTE — LETTER
2/4/2022         RE: Cheryle Vazquez  [address]  [place] [address]        Dear Colleague,    Thank you for referring your patient, Cheryle Vazquez, to the [facility]UROLOGY [facility]ICS [facility]. Please see a copy of my visit note below.    Neurosurgery 2-week follow-up    Ms. Vazquez is a 31-year-old female who is 2 weeks status post right L5-S1 microdiscectomy.  She states her right leg pain is resolved and she is much better.  She is walking frequently throughout the day and denies any bowel or bladder issues or weakness.  She denies any issues with her incision.    Exam     Alert oriented no acute distress  Gait is normal  Incision is healing well    Assessment    Status post right L5-S1 microdiscectomy      Plan    Gradually increase activity as tolerated.   Follow up as scheduled.       Again, thank you for allowing me to participate in the care of your patient.        Sincerely,        Horacio Joshua PA-C

## 2022-02-04 NOTE — PROGRESS NOTES
Discharge Note    Progress reporting period is from last progress note on   to Dec 20, 2021.    Cheryle failed to follow up and current status is unknown.  Please see information below for last relevant information on current status.  Patient seen for 4 visits.    SUBJECTIVE  Subjective changes noted by patient:  No major issues or flareups since last visit  .  Current pain level is  .     Previous pain level was  7/10.   Changes in function:  Yes (See Goal flowsheet attached for changes in current functional level)  Adverse reaction to treatment or activity: None    OBJECTIVE  Changes noted in objective findings: No c/o radicular sxs today. Has been hesitant to perform any exercises after last flareup. Trial of pelvic tilt, abd #3 and #4 tolerated well with limited reps.     ASSESSMENT/PLAN  Diagnosis: lumbar radiculopathy   Updated problem list and treatment plan:   Pain - HEP  STG/LTGs have been met or progress has been made towards goals:  Yes, please see goal flowsheet for most current information  Assessment of Progress: current status is unknown.    Last current status: Pt is progressing as expected   Self Management Plans:  HEP  I have re-evaluated this patient and find that the nature, scope, duration and intensity of the therapy is appropriate for the medical condition of the patient.  Cheryle continues to require the following intervention to meet STG and LTG's:  HEP.    Recommendations:  Discharge with current home program.  Patient to follow up with MD as needed.    Please refer to the daily flowsheet for treatment today, total treatment time and time spent performing 1:1 timed codes.

## 2022-02-04 NOTE — NURSING NOTE
"Cheryle Vazquez is a 31 year old female who presents for:  Chief Complaint   Patient presents with     Surgical Followup     2 wk f/u DOS 1/20/22: R L5-S1 mircodiscectomy        Initial Vitals:  BP (!) 142/77   Pulse 63   SpO2 98%  Estimated body mass index is 56.39 kg/m  as calculated from the following:    Height as of 1/20/22: 5' 4\" (1.626 m).    Weight as of 1/20/22: 328 lb 8 oz (149 kg).. There is no height or weight on file to calculate BSA. BP completed using cuff size: regular  No Pain (1)    Flynn Ac MA    "

## 2022-02-04 NOTE — PROGRESS NOTES
Neurosurgery 2-week follow-up    Ms. Vazquez is a 31-year-old female who is 2 weeks status post right L5-S1 microdiscectomy.  She states her right leg pain is resolved and she is much better.  She is walking frequently throughout the day and denies any bowel or bladder issues or weakness.  She denies any issues with her incision.    Exam     Alert oriented no acute distress  Gait is normal  Incision is healing well    Assessment    Status post right L5-S1 microdiscectomy      Plan    Gradually increase activity as tolerated.   Follow up as scheduled.

## 2022-03-10 ENCOUNTER — OFFICE VISIT (OUTPATIENT)
Dept: NEUROSURGERY | Facility: CLINIC | Age: 32
End: 2022-03-10
Payer: COMMERCIAL

## 2022-03-10 VITALS — HEART RATE: 85 BPM | DIASTOLIC BLOOD PRESSURE: 93 MMHG | SYSTOLIC BLOOD PRESSURE: 163 MMHG | OXYGEN SATURATION: 97 %

## 2022-03-10 DIAGNOSIS — Z98.890 S/P LUMBAR MICRODISCECTOMY: Primary | ICD-10-CM

## 2022-03-10 PROCEDURE — 99024 POSTOP FOLLOW-UP VISIT: CPT | Performed by: PHYSICIAN ASSISTANT

## 2022-03-10 NOTE — NURSING NOTE
"Cheryle Vazquez is a 32 year old female who presents for:  Chief Complaint   Patient presents with     Surgical Followup     6 week post op follow up; DOS 1/20/22        Initial Vitals:  BP (!) 163/93   Pulse 85   SpO2 97%  Estimated body mass index is 56.39 kg/m  as calculated from the following:    Height as of 1/20/22: 5' 4\" (1.626 m).    Weight as of 1/20/22: 328 lb 8 oz (149 kg).. There is no height or weight on file to calculate BSA. BP completed using cuff size: wrist cuff  Data Unavailable    Terry Dougherty MA    "

## 2022-03-10 NOTE — PROGRESS NOTES
Neurosurgery 6-week follow-up    Ms. Vazquez is a 31-year-old female who is 6 weeks status post right L5-S1 microdiscectomy.  She states her right leg pain is resolved and she is much better.  She is walking frequently throughout the day and denies any bowel or bladder issues or weakness.  She denies any issues with her incision.    Exam     Alert oriented no acute distress  Gait is normal  Incision is healing well    Assessment    Status post right L5-S1 microdiscectomy      Plan    Gradually increase activity as tolerated.   Follow up as scheduled.

## 2022-03-10 NOTE — LETTER
3/10/2022         RE: Cheryle Vazquez  [address]  [place] [address]        Dear Colleague,    Thank you for referring your patient, Cheryle Vazquez, to the [facility] NEUROLOGY [facility]INICS [facility]. Please see a copy of my visit note below.    Neurosurgery 6-week follow-up    Ms. Vazquez is a 31-year-old female who is 6 weeks status post right L5-S1 microdiscectomy.  She states her right leg pain is resolved and she is much better.  She is walking frequently throughout the day and denies any bowel or bladder issues or weakness.  She denies any issues with her incision.    Exam     Alert oriented no acute distress  Gait is normal  Incision is healing well    Assessment    Status post right L5-S1 microdiscectomy      Plan    Gradually increase activity as tolerated.   Follow up as scheduled.       Again, thank you for allowing me to participate in the care of your patient.        Sincerely,        Horacio Joshua PA-C

## 2022-03-11 ENCOUNTER — TELEPHONE (OUTPATIENT)
Dept: FAMILY MEDICINE | Facility: CLINIC | Age: 32
End: 2022-03-11
Payer: COMMERCIAL

## 2022-04-22 ENCOUNTER — OFFICE VISIT (OUTPATIENT)
Dept: NEUROSURGERY | Facility: CLINIC | Age: 32
End: 2022-04-22
Payer: COMMERCIAL

## 2022-04-22 VITALS
RESPIRATION RATE: 18 BRPM | BODY MASS INDEX: 50.02 KG/M2 | SYSTOLIC BLOOD PRESSURE: 140 MMHG | WEIGHT: 293 LBS | HEIGHT: 64 IN | DIASTOLIC BLOOD PRESSURE: 90 MMHG | HEART RATE: 84 BPM

## 2022-04-22 DIAGNOSIS — Z98.890 S/P LUMBAR MICRODISCECTOMY: Primary | ICD-10-CM

## 2022-04-22 DIAGNOSIS — M51.16 LUMBAR DISC HERNIATION WITH RADICULOPATHY: ICD-10-CM

## 2022-04-22 PROCEDURE — 99207 PR NO CHARGE LOS: CPT | Performed by: NEUROLOGICAL SURGERY

## 2022-04-22 NOTE — PROGRESS NOTES
"It was a pleasure to see Cheryle Vazquez today in Neurosurgery Clinic. She is a 32 year old female who underwent:    Procedure Date: 01/20/2022     PREOPERATIVE DIAGNOSES:  1.  Right L5-S1 herniated disk with radiculopathy.  2.  Morbid obesity with BMI of 56.     POSTOPERATIVE DIAGNOSES:  1.  Right L5-S1 herniated disk with radiculopathy.  2.  Morbid obesity with BMI of 56.     DESCRIPTION OF PROCEDURE:  Right L5-S1 microdiskectomy.     SURGEON:  Andrzej Hwang MD  .   ASSISTANT:  Nikolai Dumont PA-C.     ANESTHESIA:  General endotracheal anesthesia.     ESTIMATED BLOOD LOSS:  25 mL.    She is doing well.  She does have some mild residual pain when she wakes from sleeping in the morning if she does not stretch first.  Otherwise she is doing quite well.      Vitals:    04/22/22 1342   BP: (!) 140/90   Pulse: 84   Resp: 18   Weight: 148.8 kg (328 lb)   Height: 1.626 m (5' 4\")     Estimated body mass index is 56.3 kg/m  as calculated from the following:    Height as of this encounter: 1.626 m (5' 4\").    Weight as of this encounter: 148.8 kg (328 lb).  Data Unavailable    Well-healed incision.  Bilateral lower extremity strength 5 out of 5 in all muscle groups.    Imaging: No new imaging.    Assessment: Status post lumbar discectomy.    Plan: Her mild symptoms sound like there may be just some tension from postoperative scar tissue I have made a referral to physical therapy for evaluation and for education for nerve glides to see if we can improve the symptoms.  She may otherwise follow-up on an as-needed basis.     "

## 2022-04-22 NOTE — NURSING NOTE
"Cheryle Vazquez is a 32 year old female who presents for:  Chief Complaint   Patient presents with     Surgical Followup     12 week         Initial Vitals:  BP (!) 140/90   Pulse 84   Resp 18   Ht 5' 4\" (1.626 m)   Wt 328 lb (148.8 kg)   BMI 56.30 kg/m   Estimated body mass index is 56.3 kg/m  as calculated from the following:    Height as of this encounter: 5' 4\" (1.626 m).    Weight as of this encounter: 328 lb (148.8 kg).. Body surface area is 2.59 meters squared. BP completed using cuff size: wrist cuff  Data Unavailable    Nursing Comments: Patient states that in the mornings she has sharp pains down the right leg.     Suma Cruz, [facility]    "

## 2022-04-22 NOTE — LETTER
"    4/22/2022         RE: Cheryle Vazquez  [address]  [facility] [address]        Dear Colleague,    Thank you for referring your patient, Cheryle Vazquez, to the [facility] NEUROLOGY [facility]. Please see a copy of my visit note below.    It was a pleasure to see Cheryle Vazquez today in Neurosurgery Clinic. She is a 32 year old female who underwent:    Procedure Date: 01/20/2022     PREOPERATIVE DIAGNOSES:  1.  Right L5-S1 herniated disk with radiculopathy.  2.  Morbid obesity with BMI of 56.     POSTOPERATIVE DIAGNOSES:  1.  Right L5-S1 herniated disk with radiculopathy.  2.  Morbid obesity with BMI of 56.     DESCRIPTION OF PROCEDURE:  Right L5-S1 microdiskectomy.     SURGEON:  Andrzej Hwang MD  .   ASSISTANT:  Nikolai Dumont PA-C.     ANESTHESIA:  General endotracheal anesthesia.     ESTIMATED BLOOD LOSS:  25 mL.    She is doing well.  She does have some mild residual pain when she wakes from sleeping in the morning if she does not stretch first.  Otherwise she is doing quite well.      Vitals:    04/22/22 1342   BP: (!) 140/90   Pulse: 84   Resp: 18   Weight: 148.8 kg (328 lb)   Height: 1.626 m (5' 4\")     Estimated body mass index is 56.3 kg/m  as calculated from the following:    Height as of this encounter: 1.626 m (5' 4\").    Weight as of this encounter: 148.8 kg (328 lb).  Data Unavailable    Well-healed incision.  Bilateral lower extremity strength 5 out of 5 in all muscle groups.    Imaging: No new imaging.    Assessment: Status post lumbar discectomy.    Plan: Her mild symptoms sound like there may be just some tension from postoperative scar tissue I have made a referral to physical therapy for evaluation and for education for nerve glides to see if we can improve the symptoms.  She may otherwise follow-up on an as-needed basis.         Again, thank you for allowing me to participate in the care of your patient.        Sincerely,        Andrzej Hwang, " MD

## 2022-05-03 ENCOUNTER — THERAPY VISIT (OUTPATIENT)
Dept: PHYSICAL THERAPY | Facility: CLINIC | Age: 32
End: 2022-05-03
Attending: NEUROLOGICAL SURGERY
Payer: COMMERCIAL

## 2022-05-03 DIAGNOSIS — M51.16 LUMBAR DISC HERNIATION WITH RADICULOPATHY: ICD-10-CM

## 2022-05-03 DIAGNOSIS — Z98.890 S/P LUMBAR MICRODISCECTOMY: ICD-10-CM

## 2022-05-03 PROCEDURE — 97110 THERAPEUTIC EXERCISES: CPT | Mod: GP | Performed by: PHYSICAL THERAPIST

## 2022-05-03 PROCEDURE — 97161 PT EVAL LOW COMPLEX 20 MIN: CPT | Mod: GP | Performed by: PHYSICAL THERAPIST

## 2022-05-03 NOTE — PROGRESS NOTES
[facility]    OUTPATIENT Physical Therapy ORTHOPEDIC EVALUATION  PLAN OF TREATMENT FOR OUTPATIENT REHABILITATION  (COMPLETE FOR INITIAL CLAIMS ONLY)  Patient's Last Name, First Name, M.I.  YOB: 1990  Cheryle Vazquez    Provider s Name:  [facility]   Medical Record No.  [id number]   Start of Care Date:  05/03/22   Onset Date:  01/20/22    Type:     _X__PT   ___OT Medical Diagnosis:    Encounter Diagnoses   Name Primary?    S/P lumbar microdiscectomy     Lumbar disc herniation with radiculopathy         Treatment Diagnosis:  s/p L5-S1 R microdiscectomy        Goals:     05/03/22 0500   Body Part   Goals listed below are for R buttock   Goal #1   Goal #1 self cares/transfers/bed mobility   Previous Functional Level No restrictions   Current Functional Level   (onset of R buttock sx's with transitional movements in AM)   Performance Level up to 2/10   STG Target Performance   (decrease R buttock sx's to 1/10 or less in AM)   Rationale   (pain free movements in AM)   Due Date 05/24/22   LTG Target Performance   (pain free transitional movements in AM)   Rationale   (pain free movements in AM)   Due Date 06/14/22       Therapy Frequency:  1x/week  Predicted Duration of Therapy Intervention:  3 weeks    Suri Pierce, PT                 I CERTIFY THE NEED FOR THESE SERVICES FURNISHED UNDER        THIS PLAN OF TREATMENT AND WHILE UNDER MY CARE     (Physician attestation of this document indicates review and certification of the therapy plan).                     Certification Date From:  05/03/22   Certification Date To:  05/31/22    Referring Provider:  Andrzej Hwang    Initial Assessment        See Epic Evaluation SOC Date: 05/03/22

## 2022-05-03 NOTE — PROGRESS NOTES
Physical Therapy Initial Evaluation  Subjective:  The history is provided by the patient. No  was used.   Patient Health History  Cheryle Vazquez being seen for R buttock sx's .     Date of Onset: one month ago.   Problem occurred: insidious onset   Pain score: 0-2/10.  General health as reported by patient is fair.  Pertinent medical history includes: asthma, depression and overweight.     Medical allergies: see epic.   Surgeries include:  Orthopedic surgery. Other surgery history details: s/p R L5-S1 microdiscectomy 1-20-22.    Current medications: none reported.    Current occupation is unemployed.                     Therapist Generated HPI Evaluation  Problem details: Pt presents with complaints of intermittent R buttock pain. Pt reported undergoing back surgery in January of 2022. Reported resolution of back and leg pain. Approximately one month ago, pt reported onset of R buttock pain with transitioning out of bed in the AM. Pt reported sx's resolved once out of bed. Pt unaware as to what provoked onset of sx's one month ago. Pt reported she is currently unemployed however takes care of a household of multiple family members. Pt reported follow-up office visit on 4-22-22; referral to PT made at that time.         Type of problem:  Lumbar.    This is a new (onset one month ago) condition.  Condition occurred with:  Insidious onset.  Where condition occurred: for unknown reasons.    and is intermittent.  Pain radiates to:  Gluteals right. Pain is worse in the A.M..  Since onset symptoms are unchanged.  Symptoms are exacerbated by other (transitional movement out of bed in the AM)                                Objective:    Gait:    Gait Type:  Normal                    Lumbar/SI Evaluation  ROM:    AROM Lumbar:   Flexion:        Fingertips to mid lower leg, no provocation of sx's  Ext:                    Moderate loss, no provocation of sx's    Side Bend:        Left:     Right:    Rotation:           Left:     Right:   Side Glide:        Left:     Right:           Lumbar Myotomes:  normal                  Neural Tension/Mobility:      Left side:SLR  negative.   Right side:   SLR positive.      Lumbar Provocation:        Right negative with:  PROM hip                                                 General     ROS    Assessment/Plan:    Patient is a 32 year old female with lumbar complaints.    Patient has the following significant findings with corresponding treatment plan.                Diagnosis 1:  S/p R L5-S1 microdiscectomy  Pain -  self management, education and home program    Therapy Evaluation Codes:   1) History comprised of:   Personal factors that impact the plan of care:      None.    Comorbidity factors that impact the plan of care are:      Overweight.     Medications impacting care: None.  2) Examination of Body Systems comprised of:   Body structures and functions that impact the plan of care:      Lumbar spine.   Activity limitations that impact the plan of care are:      transitional movements.  3) Clinical presentation characteristics are:   Stable/Uncomplicated.  4) Decision-Making    Low complexity using standardized patient assessment instrument and/or measureable assessment of functional outcome.  Cumulative Therapy Evaluation is: Low complexity.    Previous and current functional limitations:  (See Goal Flow Sheet for this information)    Short term and Long term goals: (See Goal Flow Sheet for this information)     Communication ability:  Patient appears to be able to clearly communicate and understand verbal and written communication and follow directions correctly.  Treatment Explanation - The following has been discussed with the patient:   RX ordered/plan of care  Anticipated outcomes  Possible risks and side effects  This patient would benefit from PT intervention to resume normal activities.   Rehab potential is excellent.    Frequency:  1 X week, once  daily  Duration:  for 3 weeks  Discharge Plan:  Achieve all LTG.  Independent in home treatment program.  Reach maximal therapeutic benefit.    Please refer to the daily flowsheet for treatment today, total treatment time and time spent performing 1:1 timed codes.

## 2022-05-27 ENCOUNTER — LAB (OUTPATIENT)
Dept: URGENT CARE | Facility: URGENT CARE | Age: 32
End: 2022-05-27
Attending: EMERGENCY MEDICINE

## 2022-05-27 ENCOUNTER — E-VISIT (OUTPATIENT)
Dept: URGENT CARE | Facility: CLINIC | Age: 32
End: 2022-05-27
Payer: COMMERCIAL

## 2022-05-27 DIAGNOSIS — Z20.822 SUSPECTED COVID-19 VIRUS INFECTION: ICD-10-CM

## 2022-05-27 DIAGNOSIS — Z20.822 SUSPECTED COVID-19 VIRUS INFECTION: Primary | ICD-10-CM

## 2022-05-27 LAB — SARS-COV-2 RNA RESP QL NAA+PROBE: NEGATIVE

## 2022-05-27 PROCEDURE — 99421 OL DIG E/M SVC 5-10 MIN: CPT | Mod: CS | Performed by: EMERGENCY MEDICINE

## 2022-05-27 PROCEDURE — U0005 INFEC AGEN DETEC AMPLI PROBE: HCPCS

## 2022-05-27 PROCEDURE — U0003 INFECTIOUS AGENT DETECTION BY NUCLEIC ACID (DNA OR RNA); SEVERE ACUTE RESPIRATORY SYNDROME CORONAVIRUS 2 (SARS-COV-2) (CORONAVIRUS DISEASE [COVID-19]), AMPLIFIED PROBE TECHNIQUE, MAKING USE OF HIGH THROUGHPUT TECHNOLOGIES AS DESCRIBED BY CMS-2020-01-R: HCPCS

## 2022-05-27 NOTE — PATIENT INSTRUCTIONS
Dear Cheryle,      Based on your responses, you may have coronavirus (COVID-19).     Will I be tested for COVID-19?  We would like to test you for COVID. I have placed orders for this test.     To schedule: go to your [facility] home page and scroll down to the section that says  You have an appointment that needs to be scheduled  and click the large green button that says  Schedule Now  and follow the steps to find the next available openings.    If you are unable to complete these [facility] scheduling steps, please call [phone] to schedule your testing.     These guidelines are for isolating before returning to work, school or .     For employers, schools and day cares: This is an official notice for this person s medical guidelines for returning in-person.     For health care sites: The CDC gives different isolation and quarantine guidelines for healthcare sites, please check with these sites before arriving.     How do I self-isolate?  You isolate when you have symptoms of COVID or a test shows you have COVID, even if you don t have symptoms.     If you DO have symptoms:  o Stay home and away from others  - For at least 5 days after your symptoms started, AND   - You are fever free for 24 hours (with no medicine that reduces fever), AND  - Your other symptoms are better.  o Wear a mask for 10 full days any time you are around others.    If you DON T have symptoms:  o Stay at home and away from others for at least 5 days after your positive test.  o Wear a mask for 10 full days any time you are around others.    How can I take care of myself?  Over the counter medications may help with your symptoms such as runny or stuffy nose, cough, chills, or fever.  Talk to your care team about your options.     Some people are at high risk of severe illness (for example, you have a weak immune system, you re 65 years or older, or you have certain medical problems). If your risk is high and your symptoms started in  the last 5 to 7 days, we strongly recommend for you to get COVID treatment as soon as possible. Paxlovid, Molnupiravir and the monoclonal antibody treatments are proven safe and effective, make you feel better faster, and prevent hospitalization and death.       To schedule an appointment to discuss COVID treatment, request an appointment on MyChart (select  COVID-19 Treatment ) or call [phone]), press [phone].      Get lots of rest. Drink extra fluids (unless a doctor has told you not to)    Take Tylenol (acetaminophen) or ibuprofen for fever or pain. If you have liver or kidney problems, ask your family doctor if it's okay to take Tylenol or ibuprofen    Take over the counter medications for your symptoms, as directed by your doctor. You may also talk to your pharmacist.      If you have other health problems (like cancer, heart failure, an organ transplant or severe kidney disease): Call your specialty clinic if you don't feel better in the next 2 days.    Know when to call 911. Emergency warning signs include:  o Trouble breathing or shortness of breath  o Pain or pressure in the chest that doesn't go away  o Feeling confused like you haven't felt before, or not being able to wake up  o Bluish-colored lips or face    Where can I get more information?    [facility] - About COVID-19: [email or web address]     CDC - What to Do If You're Sick: https://www.cdc.gov/coronavirus/2019-ncov/if-you-are-sick/index.html     CDC - Quarantine & Isolation: https://www.cdc.gov/coronavirus/2019-ncov/your-health/quarantine-isolation.html     [facility] clinical trials (COVID-19 research studies): [email or web address]    Below are the COVID-19 hotlines at the [facility] ([facility]). Interpreters are available.  o For health questions: Call 446-027-3531 or 1-746.700.5058 (7 a.m. to 7 p.m.)  o For questions about schools and childcare: Call  [phone] or [phone] (7 a.m. to 7 p.m.)  May 27, 2022  RE:  Cheryle Vazquez                                                                                                                  [address]  [place] [facility] [place] [address]      To whom it may concern:    I evaluated Cheryle Vazquez on May 27, 2022. Cheryle Vazquez should be excused from work/school.     They should let their workplace manager and staffing office know when their quarantine ends.    We can not give an exact date as it depends on the information below. They can calculate this on their own or work with their manager/staffing office to calculate this. (For example if they were exposed on 10/04, they would have to quarantine for 14 full days. That would be through 10/18. They could return on 10/19.)    Quarantine Guidelines:    If patient receives a positive COVID-19 test result, they should follow the guidance of those who are giving the results. Usually the return to work is 10 (or in some cases 20 days from symptom onset.) If they work at [employer], they must be cleared by Employee Occupational Health and Safety to return to work.      If patient receives a negative COVID-19 test result and did not have a high risk exposure to someone with a known positive COVID-19 test, they can return to work once they're free of fever for 24 hours without fever-reducing medication and their symptoms are improving or resolved.    If patient receives a negative COVID-19 test and if they had a high risk exposure to someone who has tested positive for COVID-19 then they can return to work 14 days after their last contact with the positive individual    Note: If there is ongoing exposure to the covid positive person, this quarantine period may be longer than 14 days. (For example, if they are continually exposed to their child, who tested positive and cannot isolate from them, then the quarantine of 7-14 days can't start until  their child is no longer contagious. This is typically 10 days from onset to the child's symptoms. So the total duration may be 17-24 days in this case.)     Sincerely,  Alberto Toribio MD          o

## 2022-05-29 ENCOUNTER — OFFICE VISIT (OUTPATIENT)
Dept: URGENT CARE | Facility: URGENT CARE | Age: 32
End: 2022-05-29
Payer: COMMERCIAL

## 2022-05-29 VITALS
BODY MASS INDEX: 56.3 KG/M2 | WEIGHT: 293 LBS | SYSTOLIC BLOOD PRESSURE: 120 MMHG | HEART RATE: 82 BPM | OXYGEN SATURATION: 97 % | DIASTOLIC BLOOD PRESSURE: 90 MMHG | RESPIRATION RATE: 20 BRPM | TEMPERATURE: 98.2 F

## 2022-05-29 DIAGNOSIS — J06.9 VIRAL URI WITH COUGH: Primary | ICD-10-CM

## 2022-05-29 PROCEDURE — 99213 OFFICE O/P EST LOW 20 MIN: CPT | Performed by: FAMILY MEDICINE

## 2022-05-29 RX ORDER — ALBUTEROL SULFATE 0.83 MG/ML
2.5 SOLUTION RESPIRATORY (INHALATION) EVERY 6 HOURS PRN
Qty: 90 ML | Refills: 0 | Status: SHIPPED | OUTPATIENT
Start: 2022-05-29 | End: 2023-03-30

## 2022-05-29 RX ORDER — MONTELUKAST SODIUM 10 MG/1
1 TABLET ORAL AT BEDTIME
COMMUNITY
Start: 2021-02-16 | End: 2023-03-30

## 2022-05-29 RX ORDER — BENZONATATE 100 MG/1
100 CAPSULE ORAL 3 TIMES DAILY PRN
Qty: 21 CAPSULE | Refills: 0 | Status: SHIPPED | OUTPATIENT
Start: 2022-05-29 | End: 2022-06-05

## 2022-05-29 NOTE — PROGRESS NOTES
SUBJECTIVE: Cheryle Vazquez is a 32 year old female presenting with a chief complaint of nasal congestion and cough .  Onset of symptoms was 4 day(s) ago.  Course of illness is worsening.    Severity moderate  Predisposing factors include tobacco use.  2 neg covid tests    Past Medical History:   Diagnosis Date     Acid reflux      ADHD (attention deficit hyperactivity disorder)      Depressive disorder      Eczema      Allergies   Allergen Reactions     Amoxicillin Other (See Comments)     Caused yeast infection  Patient does not want to take Amoxicillin as it causes a yeast infection.  She has requested it be added to her allergy list.     Azithromycin Rash     Sulfamethoxazole-Trimethoprim Palpitations and Rash     Possible allergic reaction     Valacyclovir Palpitations and Rash     Possible allergic reaction     Social History     Tobacco Use     Smoking status: Former Smoker     Packs/day: 0.25     Years: 0.50     Pack years: 0.12     Types: Cigarettes     Smokeless tobacco: Former User   Substance Use Topics     Alcohol use: Yes     Comment: minimal       ROS:  SKIN: no rash  GI: no vomiting    OBJECTIVE:  BP (!) 120/90   Pulse 82   Temp 98.2  F (36.8  C) (Tympanic)   Resp 20   Wt 148.8 kg (328 lb)   SpO2 97%   BMI 56.30 kg/m  GENERAL APPEARANCE: healthy, alert and no distress  EYES: EOMI,  PERRL, conjunctiva clear  HENT: ear canals and TM's normal.  Nose and mouth without ulcers, erythema or lesions  RESP: lungs clear to auscultation - no rales, rhonchi or wheezes  SKIN: no suspicious lesions or rashes      ICD-10-CM    1. Viral URI with cough  J06.9 benzonatate (TESSALON) 100 MG capsule     albuterol (PROVENTIL) (2.5 MG/3ML) 0.083% neb solution     Pt has inhaler  Fluids/Rest, f/u if worse/not any better

## 2022-08-01 NOTE — PROGRESS NOTES
Patient seen for one time evaluation and treatment.  Patient cancelled 2nd visit and no-showed for 3rd visit.  Please see initial evaluation for further information.

## 2022-11-02 ENCOUNTER — OFFICE VISIT (OUTPATIENT)
Dept: URGENT CARE | Facility: URGENT CARE | Age: 32
End: 2022-11-02
Payer: COMMERCIAL

## 2022-11-02 VITALS
WEIGHT: 293 LBS | RESPIRATION RATE: 17 BRPM | OXYGEN SATURATION: 100 % | BODY MASS INDEX: 52.35 KG/M2 | TEMPERATURE: 98.4 F | HEART RATE: 58 BPM | SYSTOLIC BLOOD PRESSURE: 130 MMHG | DIASTOLIC BLOOD PRESSURE: 81 MMHG

## 2022-11-02 DIAGNOSIS — N89.8 VAGINAL DISCHARGE: ICD-10-CM

## 2022-11-02 DIAGNOSIS — R30.0 DYSURIA: Primary | ICD-10-CM

## 2022-11-02 LAB

## 2022-11-02 PROCEDURE — 81003 URINALYSIS AUTO W/O SCOPE: CPT

## 2022-11-02 PROCEDURE — 87210 SMEAR WET MOUNT SALINE/INK: CPT

## 2022-11-02 PROCEDURE — 99214 OFFICE O/P EST MOD 30 MIN: CPT | Performed by: PHYSICIAN ASSISTANT

## 2022-11-02 RX ORDER — CEFDINIR 300 MG/1
300 CAPSULE ORAL 2 TIMES DAILY
Qty: 14 CAPSULE | Refills: 0 | Status: SHIPPED | OUTPATIENT
Start: 2022-11-02 | End: 2022-11-09

## 2022-11-02 RX ORDER — FLUCONAZOLE 150 MG/1
150 TABLET ORAL ONCE
Qty: 1 TABLET | Refills: 0 | Status: SHIPPED | OUTPATIENT
Start: 2022-11-02 | End: 2022-11-02

## 2022-12-09 NOTE — PROGRESS NOTES
SUBJECTIVE:   Cheryle Vazquez is a 32 year old female who  presents today for a possible. Symptoms of vaginal discharge and discomfort. Has had painful urination as well.  No fever, back pain, vomiting.  Kidney function WNL.    Past Medical History:   Diagnosis Date     Acid reflux      ADHD (attention deficit hyperactivity disorder)      Depressive disorder      Eczema      Current Outpatient Medications   Medication Sig Dispense Refill     albuterol (PROAIR HFA/PROVENTIL HFA/VENTOLIN HFA) 108 (90 Base) MCG/ACT inhaler INHALE 1 TO 2 PUFFS INTO THE LUNGS EVERY 6 HOURS 6.7 g 0     albuterol (PROVENTIL) (2.5 MG/3ML) 0.083% neb solution Take 1 vial (2.5 mg) by nebulization every 6 hours as needed 25 vial 0     fluticasone (FLONASE) 50 MCG/ACT nasal spray Spray 1 spray into both nostrils daily 16 g 3     methocarbamol (ROBAXIN) 750 MG tablet Take 1 tablet (750 mg) by mouth 4 times daily as needed for muscle spasms 40 tablet 0     montelukast (SINGULAIR) 10 MG tablet Take 1 tablet by mouth At Bedtime       SENNA-docusate sodium (SENNA S) 8.6-50 MG tablet Take 1 tablet by mouth At Bedtime 20 tablet 0     albuterol (PROVENTIL) (2.5 MG/3ML) 0.083% neb solution Take 1 vial (2.5 mg) by nebulization every 6 hours as needed for shortness of breath / dyspnea or wheezing 90 mL 0     citalopram (CELEXA) 20 MG tablet Take 1 tablet (20 mg) by mouth daily (Patient not taking: Reported on 11/2/2022) 90 tablet 3     lisdexamfetamine (VYVANSE) 50 MG capsule Take 1 capsule (50 mg) by mouth every morning (Patient not taking: Reported on 11/2/2022) 30 capsule 0     Social History     Tobacco Use     Smoking status: Former     Packs/day: 0.25     Years: 0.50     Pack years: 0.13     Types: Cigarettes     Smokeless tobacco: Former   Substance Use Topics     Alcohol use: Yes     Comment: minimal       ROS:   Review of systems negative except as stated above.    OBJECTIVE:  /81   Pulse 58   Temp 98.4  F (36.9  C)   Resp 17   Wt  138.3 kg (305 lb)   SpO2 100%   BMI 52.35 kg/m    GENERAL APPEARANCE: healthy, alert and no distress  RESP: lungs clear to auscultation - no rales, rhonchi or wheezes  CV: regular rates and rhythm, normal S1 S2, no murmur noted  ABDOMEN:  soft, nontender, no HSM or masses and bowel sounds normal  BACK: No CVA tenderness  GU_female: WNL, pelvic chaperoned by MA  SKIN: no suspicious lesions or rashes      Results for orders placed or performed in visit on 11/02/22   UA macro with reflex to Microscopic and Culture - Clinc Collect     Status: Normal    Specimen: Urine, Clean Catch   Result Value Ref Range    Color Urine Yellow Colorless, Straw, Light Yellow, Yellow    Appearance Urine Clear Clear    Glucose Urine Negative Negative, 1000 , >=2000 mg/dL    Bilirubin Urine Negative Negative    Ketones Urine Negative Negative, 160  mg/dL    Specific Gravity Urine >=1.030 1.003 - 1.035    Blood Urine Negative Negative    pH Urine 6.0 5.0 - 7.0    Protein Albumin Urine Negative Negative, 300 , >=2000 mg/dL    Urobilinogen Urine 0.2 0.2, 1.0 E.U./dL    Nitrite Urine Negative Negative    Leukocyte Esterase Urine Negative Negative    Narrative    Microscopic not indicated   Wet prep - Clinic Collect     Status: Abnormal    Specimen: Vagina; Swab   Result Value Ref Range    Trichomonas Absent Absent    Yeast Absent Absent    Clue Cells Absent Absent    WBCs/high power field 2+ (A) None       ASSESSMENT:   (R30.0) Dysuria  (primary encounter diagnosis)  Comment: will treat symptomatically  Plan: cefdinir (OMNICEF) 300 MG capsule, fluconazole         (DIFLUCAN) 150 MG tablet      (N89.8) Vaginal discharge  Plan: Wet prep - Clinic Collect, UA macro with reflex        to Microscopic and Culture - Clinc Collect      Red flags and emergent follow up discussed, and understood by patient  Follow up with PCP if symptoms worsen or fail to improve  In 2-3 days

## 2022-12-26 ENCOUNTER — HEALTH MAINTENANCE LETTER (OUTPATIENT)
Age: 32
End: 2022-12-26

## 2022-12-28 ENCOUNTER — TELEPHONE (OUTPATIENT)
Dept: FAMILY MEDICINE | Facility: CLINIC | Age: 32
End: 2022-12-28

## 2022-12-28 NOTE — TELEPHONE ENCOUNTER
Patient Quality Outreach    Patient is due for the following:   Asthma  -  ACT needed  Cervical Cancer Screening - PAP Needed  Depression  -  PHQ-9 needed    Next Steps:   Patient was assigned appropriate questionnaire to complete    Type of outreach:    Levlr message sent to pt      Questions for provider review:    None     Sil Bullard, [facility]

## 2023-03-30 ENCOUNTER — OFFICE VISIT (OUTPATIENT)
Dept: URGENT CARE | Facility: URGENT CARE | Age: 33
End: 2023-03-30
Payer: COMMERCIAL

## 2023-03-30 VITALS
WEIGHT: 289 LBS | OXYGEN SATURATION: 97 % | SYSTOLIC BLOOD PRESSURE: 119 MMHG | BODY MASS INDEX: 49.61 KG/M2 | HEART RATE: 70 BPM | TEMPERATURE: 98.2 F | DIASTOLIC BLOOD PRESSURE: 83 MMHG

## 2023-03-30 DIAGNOSIS — J06.9 VIRAL URI WITH COUGH: ICD-10-CM

## 2023-03-30 DIAGNOSIS — J02.9 SORE THROAT: ICD-10-CM

## 2023-03-30 DIAGNOSIS — J45.21 MILD INTERMITTENT ASTHMA WITH (ACUTE) EXACERBATION: Primary | ICD-10-CM

## 2023-03-30 LAB
DEPRECATED S PYO AG THROAT QL EIA: NEGATIVE
GROUP A STREP BY PCR: NOT DETECTED

## 2023-03-30 PROCEDURE — 87651 STREP A DNA AMP PROBE: CPT | Performed by: NURSE PRACTITIONER

## 2023-03-30 PROCEDURE — 99214 OFFICE O/P EST MOD 30 MIN: CPT | Performed by: NURSE PRACTITIONER

## 2023-03-30 RX ORDER — ALBUTEROL SULFATE 0.83 MG/ML
2.5 SOLUTION RESPIRATORY (INHALATION) EVERY 6 HOURS PRN
Qty: 90 ML | Refills: 0 | Status: SHIPPED | OUTPATIENT
Start: 2023-03-30

## 2023-03-30 RX ORDER — PREDNISONE 20 MG/1
40 TABLET ORAL DAILY
Qty: 10 TABLET | Refills: 0 | Status: SHIPPED | OUTPATIENT
Start: 2023-03-30 | End: 2023-04-04

## 2023-03-30 RX ORDER — ALBUTEROL SULFATE 90 UG/1
AEROSOL, METERED RESPIRATORY (INHALATION)
Qty: 6.7 G | Refills: 0 | Status: SHIPPED | OUTPATIENT
Start: 2023-03-30 | End: 2023-12-27

## 2023-03-30 NOTE — PATIENT INSTRUCTIONS
Do nebulizer treatment every 4 hours as needed for wheezing, shortness of breath or cough.    If breathing gets worse go to the ER.

## 2023-03-30 NOTE — LETTER
March 30, 2023      Cheryle Vazquez  [address]  [place] [place] [address]        To Whom It May Concern:    Cheryle MASHA Vazquez  was seen on 3/30/2023.  Please excuse her  until 4/3/2023 due to illness.        Sincerely,        Leilani Collins, CNP

## 2023-03-30 NOTE — PROGRESS NOTES
Chief Complaint   Patient presents with     URI     Last Monday is when this started. All last week she believes she had the flu. This week she felt a rattle in her lungs. Has a wet cough, shortness of breath, can't take deep breaths, ears hurt a bit, tired, fatigued, voice change and chest hurts when she coughs. Needs a doctors note for missing work.         ICD-10-CM    1. Mild intermittent asthma with (acute) exacerbation  J45.21 predniSONE (DELTASONE) 20 MG tablet     albuterol (PROVENTIL) (2.5 MG/3ML) 0.083% neb solution     albuterol (PROAIR HFA/PROVENTIL HFA/VENTOLIN HFA) 108 (90 Base) MCG/ACT inhaler      2. Sore throat  J02.9 Streptococcus A Rapid Screen w/Reflex to PCR - Clinic Collect      3. Viral URI with cough  J06.9 albuterol (PROVENTIL) (2.5 MG/3ML) 0.083% neb solution      Patient will take medications as prescribed.  If she develops worsening shortness of breath she will go to the emergency room for further assessment and treatment.  Viral upper respiratory infection likely stimulated asthma exacerbation.      Subjective     Cheryle Vazquez is an 33 year old female who presents to clinic today for shortness of breath, cough and wheezing for 2 days. Home sick last week with runny nose, cough, low grade fever, fatigue. All of these improved except the cough.      ROS: 10 point ROS neg other than the symptoms noted above in the HPI.       Objective    /83   Pulse 70   Temp 98.2  F (36.8  C) (Oral)   Wt 131.1 kg (289 lb)   SpO2 97%   BMI 49.61 kg/m    Nurses notes and VS have been reviewed.    Physical Exam       GENERAL APPEARANCE: healthy appearing, alert     EYES: PERRL, EOMI, sclera non-icteric     HENT: oral exam benign, mucus membranes intact, without ulcers or lesions     NECK: no adenopathy or asymmetry, thyroid normal to palpation     RESP: Expiratory wheezes heard throughout lung fields     CV: regular rates and rhythm, no murmurs, rubs, or gallop     SKIN: no suspicious  lesions or rashes      KENNA Coffey, CNP  [facility] Provider    The use of Dragon/[employer] dictation services may have been used to construct the content in this note; any grammatical or spelling errors are non-intentional. Please contact the author of this note directly if you are in need of any clarification.

## 2023-04-22 ENCOUNTER — HEALTH MAINTENANCE LETTER (OUTPATIENT)
Age: 33
End: 2023-04-22

## 2023-12-27 ENCOUNTER — VIRTUAL VISIT (OUTPATIENT)
Dept: FAMILY MEDICINE | Facility: CLINIC | Age: 33
End: 2023-12-27
Payer: COMMERCIAL

## 2023-12-27 ENCOUNTER — NURSE TRIAGE (OUTPATIENT)
Dept: NURSING | Facility: CLINIC | Age: 33
End: 2023-12-27

## 2023-12-27 DIAGNOSIS — U07.1 INFECTION DUE TO 2019 NOVEL CORONAVIRUS: Primary | ICD-10-CM

## 2023-12-27 DIAGNOSIS — J45.21 MILD INTERMITTENT ASTHMA WITH (ACUTE) EXACERBATION: ICD-10-CM

## 2023-12-27 PROCEDURE — 99441 PR PHYSICIAN TELEPHONE EVALUATION 5-10 MIN: CPT | Mod: 93 | Performed by: INTERNAL MEDICINE

## 2023-12-27 RX ORDER — ALBUTEROL SULFATE 90 UG/1
AEROSOL, METERED RESPIRATORY (INHALATION)
Qty: 6.7 G | Refills: 0 | Status: SHIPPED | OUTPATIENT
Start: 2023-12-27 | End: 2024-09-09

## 2023-12-27 ASSESSMENT — ANXIETY QUESTIONNAIRES
5. BEING SO RESTLESS THAT IT IS HARD TO SIT STILL: NOT AT ALL
GAD7 TOTAL SCORE: 2
6. BECOMING EASILY ANNOYED OR IRRITABLE: NOT AT ALL
1. FEELING NERVOUS, ANXIOUS, OR ON EDGE: SEVERAL DAYS
2. NOT BEING ABLE TO STOP OR CONTROL WORRYING: SEVERAL DAYS
3. WORRYING TOO MUCH ABOUT DIFFERENT THINGS: NOT AT ALL
GAD7 TOTAL SCORE: 2
7. FEELING AFRAID AS IF SOMETHING AWFUL MIGHT HAPPEN: NOT AT ALL
IF YOU CHECKED OFF ANY PROBLEMS ON THIS QUESTIONNAIRE, HOW DIFFICULT HAVE THESE PROBLEMS MADE IT FOR YOU TO DO YOUR WORK, TAKE CARE OF THINGS AT HOME, OR GET ALONG WITH OTHER PEOPLE: NOT DIFFICULT AT ALL

## 2023-12-27 ASSESSMENT — ASTHMA QUESTIONNAIRES: ACT_TOTALSCORE: 25

## 2023-12-27 ASSESSMENT — PATIENT HEALTH QUESTIONNAIRE - PHQ9
5. POOR APPETITE OR OVEREATING: NOT AT ALL
SUM OF ALL RESPONSES TO PHQ QUESTIONS 1-9: 0

## 2023-12-27 NOTE — TELEPHONE ENCOUNTER
COVID Positive/Requesting COVID treatment    Patient is positive for COVID and requesting treatment options. Elevated BMI, asthma hx.  Date of positive COVID test (PCR or at home)12/26/23  Current COVID symptoms: fever or chills, cough, shortness of breath or difficulty breathing, muscle or body aches, congestion or runny nose, and nausea or vomiting  Date COVID symptoms began: 12/26/23    Patient reporting history of asthma, stating she has not started her albuterol neb yet.  Denies shortness of breath/chest pain during triage.  Taking fluids. Reporting family members in home have tested positive for COVID 19.    Transferred to Central Scheduling. Patient does not have PCP at this time. Virtual Visit scheduled for 12/27/23.    Reason for Disposition   HIGH RISK patient (e.g., weak immune system, age > 64 years, obesity with BMI of 30 or higher, pregnant, chronic lung disease or other chronic medical condition) and COVID symptoms (e.g., cough, fever)  (Exceptions: Already seen by doctor or NP/PA and no new or worsening symptoms.)    Additional Information   Negative: SEVERE difficulty breathing (e.g., struggling for each breath, speaks in single words)   Negative: Difficult to awaken or acting confused (e.g., disoriented, slurred speech)   Negative: Bluish (or gray) lips or face now   Negative: Shock suspected (e.g., cold/pale/clammy skin, too weak to stand, low BP, rapid pulse)   Negative: Sounds like a life-threatening emergency to the triager   Negative: SEVERE or constant chest pain or pressure  (Exception: Mild central chest pain, present only when coughing.)   Negative: MODERATE difficulty breathing (e.g., speaks in phrases, SOB even at rest, pulse 100-120)   Negative: Headache and stiff neck (can't touch chin to chest)   Negative: Oxygen level (e.g., pulse oximetry) 90% or lower   Negative: Chest pain or pressure  (Exception: MILD central chest pain, present only when coughing.)   Negative: Drinking very  little and dehydration suspected (e.g., no urine > 12 hours, very dry mouth, very lightheaded)   Negative: Patient sounds very sick or weak to the triager   Negative: MILD difficulty breathing (e.g., minimal/no SOB at rest, SOB with walking, pulse <100)   Negative: Fever > 103 F (39.4 C)   Negative: Fever > 101 F (38.3 C) and over 60 years of age   Negative: Fever > 100.0 F (37.8 C) and bedridden (e.g., CVA, chronic illness, recovering from surgery)    Protocols used: Coronavirus (COVID-19) Diagnosed or [id number]

## 2024-01-30 ENCOUNTER — OFFICE VISIT (OUTPATIENT)
Dept: URGENT CARE | Facility: URGENT CARE | Age: 34
End: 2024-01-30
Payer: COMMERCIAL

## 2024-01-30 VITALS
TEMPERATURE: 98.5 F | DIASTOLIC BLOOD PRESSURE: 62 MMHG | WEIGHT: 291 LBS | HEART RATE: 62 BPM | OXYGEN SATURATION: 98 % | SYSTOLIC BLOOD PRESSURE: 93 MMHG | BODY MASS INDEX: 49.95 KG/M2 | RESPIRATION RATE: 16 BRPM

## 2024-01-30 DIAGNOSIS — R31.9 URINARY TRACT INFECTION WITH HEMATURIA, SITE UNSPECIFIED: Primary | ICD-10-CM

## 2024-01-30 DIAGNOSIS — R35.0 URINARY FREQUENCY: ICD-10-CM

## 2024-01-30 DIAGNOSIS — N39.0 URINARY TRACT INFECTION WITH HEMATURIA, SITE UNSPECIFIED: Primary | ICD-10-CM

## 2024-01-30 LAB
ALBUMIN UR-MCNC: NEGATIVE MG/DL
APPEARANCE UR: ABNORMAL
BACTERIA #/AREA URNS HPF: ABNORMAL /HPF
BILIRUB UR QL STRIP: NEGATIVE
COLOR UR AUTO: YELLOW
GLUCOSE UR STRIP-MCNC: NEGATIVE MG/DL
HGB UR QL STRIP: ABNORMAL
KETONES UR STRIP-MCNC: NEGATIVE MG/DL
LEUKOCYTE ESTERASE UR QL STRIP: ABNORMAL
MUCOUS THREADS #/AREA URNS LPF: PRESENT /LPF
NITRATE UR QL: NEGATIVE
PH UR STRIP: 6 [PH] (ref 5–7)
RBC #/AREA URNS AUTO: ABNORMAL /HPF
SP GR UR STRIP: 1.02 (ref 1–1.03)
SQUAMOUS #/AREA URNS AUTO: ABNORMAL /LPF
UROBILINOGEN UR STRIP-ACNC: 0.2 E.U./DL
WBC #/AREA URNS AUTO: ABNORMAL /HPF

## 2024-01-30 PROCEDURE — 81001 URINALYSIS AUTO W/SCOPE: CPT

## 2024-01-30 PROCEDURE — 87086 URINE CULTURE/COLONY COUNT: CPT | Performed by: FAMILY MEDICINE

## 2024-01-30 PROCEDURE — 99213 OFFICE O/P EST LOW 20 MIN: CPT | Performed by: FAMILY MEDICINE

## 2024-01-30 RX ORDER — CEFDINIR 300 MG/1
300 CAPSULE ORAL 2 TIMES DAILY
Qty: 10 CAPSULE | Refills: 0 | Status: SHIPPED | OUTPATIENT
Start: 2024-01-30 | End: 2024-02-04

## 2024-01-30 NOTE — PROGRESS NOTES
SUBJECTIVE: Cheryle Vazquez is a 33 year old female who  presents today for a possible UTI.   Symptoms of dysuria and frequency have been going on for this a.    Hematuria no.  still present.    There is no history of fever, chills, nausea or vomiting.   This patient does have a history of urinary tract infections.   Patient denies long duration and flank pain    Past Medical History:   Diagnosis Date    Acid reflux     ADHD (attention deficit hyperactivity disorder)     Depressive disorder     Eczema      Allergies   Allergen Reactions    Amoxicillin Other (See Comments)     Caused yeast infection  Patient does not want to take Amoxicillin as it causes a yeast infection.  She has requested it be added to her allergy list.    Azithromycin Rash    Sulfamethoxazole-Trimethoprim Palpitations and Rash     Possible allergic reaction    Valacyclovir Palpitations and Rash     Possible allergic reaction     Social History     Tobacco Use    Smoking status: Former     Packs/day: 0.25     Years: 0.50     Additional pack years: 0.00     Total pack years: 0.13     Types: Cigarettes    Smokeless tobacco: Former   Substance Use Topics    Alcohol use: Yes     Comment: minimal       ROS: CONSTITUTIONAL:NEGATIVE for fever, chills, change in weight    OBJECTIVE:  BP 93/62 (BP Location: Left arm, Patient Position: Sitting, Cuff Size: Adult Large)   Pulse 62   Temp 98.5  F (36.9  C) (Tympanic)   Resp 16   Wt 132 kg (291 lb)   SpO2 98%   Breastfeeding No   BMI 49.95 kg/m      No Flank/abd pain      ICD-10-CM    1. Urinary tract infection with hematuria, site unspecified  N39.0 cefdinir (OMNICEF) 300 MG capsule    R31.9       2. Urinary frequency  R35.0 UA Macroscopic with reflex to Microscopic and Culture - Clinic Collect     UA Microscopic with Reflex to Culture     Urine Culture          Drink plenty of fluids.  Prevention and treatment of UTI's discussed.Signs and symptoms of pyelonephritis mentioned.  Follow up with  primary care physician if not improving

## 2024-02-01 LAB — BACTERIA UR CULT: NORMAL

## 2024-03-29 NOTE — PROGRESS NOTES
Progress Note    Patient Name: Cheryle Vazquez  Date: 12/17/2020         Service Type: Individual      Session Start Time: 9:04am  Session End Time: 9:46am     Session Length: 42 minutes     Session #:  11    Attendees: Client attended alone    Service Modality:  Phone    Telemedicine Visit: The patient's condition can be safely assessed and treated via synchronous audio and visual telemedicine encounter.      Reason for Telemedicine Visit: Services only offered telehealth    Originating Site (Patient Location): Patient's home    Distant Site (Provider Location): Provider Remote Setting    Consent:  The patient/guardian has verbally consented to: the potential risks and benefits of telemedicine (Phone) versus in person care; bill my insurance or make self-payment for services provided; and responsibility for payment of non-covered services.     Mode of Communication:  Phone    As the provider I attest to compliance with applicable laws and regulations related to telemedicine.     Treatment Plan Last Reviewed: 11/12/2020  PHQ-9 / AAMIR-7: 12/4/2020    DATA  Interactive Complexity: No  Crisis: No       Progress Since Last Session (Related to Symptoms / Goals / Homework):   Symptoms: No change Patient reported continued anxiety and depression symptoms.    Homework: Achieved / completed to satisfaction      Episode of Care Goals: No improvement - PREPARATION (Decided to change - considering how); Intervened by negotiating a change plan and determining options / strategies for behavior change, identifying triggers, exploring social supports, and working towards setting a date to begin behavior change     Current / Ongoing Stressors and Concerns:  Ongoing: Patient reported a hx of trauma related to her ex- and ongoing anxiety symptoms.    Current: Patient provided an update about her mood and symptoms over the past week. Patient and clinician processed some ongoing  Brittany has been having increased secretions and need for suction for the last couple of days. Over the last 36 hours, she has developed thick, yellowish secretions, had low temperature last night, has needed supplemental oxygen at times, appears uncomfortable and has had increased seizure activity. Mom and nurses are following sick protocol for airway clearance.     stressors and trauma for the patient. Clinician provided psycho-education about trauma to the patient and explored responses to trauma.       Treatment Objective(s) Addressed in This Session:   Patient will identify at least three phsycial activities to implement into her daily schedule.  Patient will implement healthier eating habits into her daily schedule.  Patient will identify three fears / thoughts that contribute to feeling anxious.  Patient will identify three distraction and diversion activities and use those activities to decrease level of anxiety.        Intervention:   Motivational Interviewing    MI Intervention: Supported Autonomy, Collaboration, Evocation and Open-ended questions     Change Talk Expressed by the Patient: Committment to change    Provider Response to Change Talk: E - Evoked more info from patient about behavior change, A - Affirmed patient's thoughts, decisions, or attempts at behavior change, R - Reflected patient's change talk and S - Summarized patient's change talk statements    CBT: Discussed strategies to be able to identify trauma symptoms and techniques to cope with them.     ASSESSMENT: Current Emotional / Mental Status (status of significant symptoms):   Risk status (Self / Other harm or suicidal ideation)   Patient denies current fears or concerns for personal safety.   Patient reports the following current or recent suicidal ideation or behaviors: The patient reported SI thoughts have decreased but are still present. .   Patient denies current or recent homicidal ideation or behaviors.   Patient denies current or recent self injurious behavior or ideation.   Patient denies other safety concerns.   Patient reports there has been no change in risk factors since their last session.     Patient reports there has been no change in protective factors since their last session.     A safety and risk management plan has been developed including: Patient consented to co-developed  safety plan.  Safety and risk management plan was completed.  Patient agreed to use safety plan should any safety concerns arise.  A copy was given to the patient.     Appearance:   Unable to assess over the phone.    Eye Contact:   Unable to assess over the phone.     Psychomotor Behavior: Unable to assess over the phone.    Attitude:   Playful Friendly   Orientation:   All   Speech    Rate / Production: Talkative    Volume:  Normal    Mood:    Normal   Affect:    Bright    Thought Content:  Clear    Thought Form:  Coherent  Logical    Insight:    Fair      Medication Review:   Changes to psychiatric medications, see updated Medication List in EPIC.      Medication Compliance:   Yes Patient recently re-started medication.     Changes in Health Issues:   Yes: Patient reported that she recently injured her knee.     Chemical Use Review:   Substance Use: Chemical use reviewed, no active concerns identified      Tobacco Use: No change in amount of tobacco use since last session.  Patient declined discussion at this time    Diagnosis:  1. Mild episode of recurrent major depressive disorder (H)        Collateral Reports Completed:   Not Applicable    PLAN: (Patient Tasks / Therapist Tasks / Other)  The patient has been scheduled for a follow up appointment on 12/31. The patient was assigned the homework of outlining barriers that have been impacting her ability to reach goals. Clinician and patient will process these barriers in the next session.     JOSE ALEJANDRO CLEMENTE, [facility]  12/17/2020  Service Performed and Documented by [facility]-   Note reviewed and clinical supervision by JOSE ALEJANDRO Herman [facility] 12/19/2020  ______________________________________________________________________    Treatment Plan    Patient's Name: Cheryle Vazquez  YOB: 1990    Date: 08/07/2020  Reviewed: 11/12/2020    DSM5 Diagnoses: 296.31 (F33.0) Major Depressive Disorder, Recurrent Episode, Mild With anxious  distress  Psychosocial / Contextual Factors: Hx of trauma, hx of depression, psychosocial stressors including previous relationships (divorce)  WHODAS: 18    Referral / Collaboration:  Referral to another professional/service is not indicated at this time.    Anticipated number of session or this episode of care: 20      MeasurableTreatment Goal(s) related to diagnosis / functional impairment(s)  Goal 1: Patient will implement healthy lifestyle skills to aid in the improvement of her overall mood.      I will know I've met my goal when I am exercising regularly and not feeling down.     Objective #A (Patient Action)    Patient will identify at least three phsycial activities to implement into her daily schedule. .  Status: Continued - Date(s):11/12/20     Intervention(s)  Therapist will assign the patient a weekly goal in the context of completing physical activity exercises.     Objective #B  Patient will implement healthier eating habits into her daily schedule. .  Status: Continued - Date(s): 11/12/20     Intervention(s)  Therapist will assign the patient a weekly goal related to making healthier eating habits.     Objective #C  Patient will increase knowledge and understanding about the importance of healthy eating and how that affects mental health.  Status: Continued - Date(s): 11/12/20      Intervention(s)  Therapist will provide the patient with psycho-education related to healthy eating the mental health connection.      Goal 2: Patient will experience a decrease in anxiety symptoms as evidenced by a AAMIR 7 score of 5 or less.      I will know I've met my goal when I'm not as anxious.      Objective #A (Patient Action)    Status: Continued - Date(s): 11/12/20     Patient will identify three fears / thoughts that contribute to feeling anxious.    Intervention(s)  Therapist will assign homework assign homework for the patient to track anxious thoughts.    Objective #B  Patient will identify three distraction  "and diversion activities and use those activities to decrease level of anxiety  .    Status: Continued - Date(s): 11/12/20     Intervention(s)  Therapist will teach the patient varying skills and techniques to be used as distraction skills and assign homework for practice outside of sessions.    Objective #C  Patient will use thought-stopping strategy daily to reduce intrusive thoughts.  Status: Continued - Date(s): 11/12/20     Intervention(s)  Therapist will teach the patient cognitive re-framing skills to challenge negative and anxious thoughts.      Goal 3: Patiient will develop a better understanding and knowledge of trauma while implementing effective skills to address functioning impairments related to trauma.      I will know I've met my goal when I situations related to my ex- does not interfere with my day to day functioning.      Objective #A (Patient Action)    Status: Continued - Date(s): 11/12/20     Patient will identify and unpack at least two \"boxes\" related to her trauma. .    Intervention(s)  Therapist will guide the patient through porcessing traumatic events in the session..    Objective #B  Patient will implement mindfulness and grounding skills to help the patient regulate when processing trumatic experiences.    Status: Continued - Date(s): 11/12/20     Intervention(s)  Therapist will teach the patient mindfulness and grounding skills.     Patient has reviewed and agreed to the above plan.      JOSE ALEJANDRO CLEMENTE, SW  November 12, 2020  Treatment plan reviewed and clinical supervision by JOSE ALEJANDRO Herman [facility] 11/10/2020  "

## 2024-06-23 ENCOUNTER — HEALTH MAINTENANCE LETTER (OUTPATIENT)
Age: 34
End: 2024-06-23

## 2024-09-09 ENCOUNTER — VIRTUAL VISIT (OUTPATIENT)
Dept: INTERNAL MEDICINE | Facility: CLINIC | Age: 34
End: 2024-09-09
Payer: COMMERCIAL

## 2024-09-09 DIAGNOSIS — F33.1 MODERATE EPISODE OF RECURRENT MAJOR DEPRESSIVE DISORDER (H): Primary | ICD-10-CM

## 2024-09-09 DIAGNOSIS — F41.1 GENERALIZED ANXIETY DISORDER: ICD-10-CM

## 2024-09-09 DIAGNOSIS — F17.200 TOBACCO USE DISORDER: ICD-10-CM

## 2024-09-09 DIAGNOSIS — N30.90 RECURRENT CYSTITIS: ICD-10-CM

## 2024-09-09 DIAGNOSIS — J45.21 MILD INTERMITTENT ASTHMA WITH (ACUTE) EXACERBATION: ICD-10-CM

## 2024-09-09 PROCEDURE — 99214 OFFICE O/P EST MOD 30 MIN: CPT | Mod: 95 | Performed by: INTERNAL MEDICINE

## 2024-09-09 PROCEDURE — G2211 COMPLEX E/M VISIT ADD ON: HCPCS | Mod: 95 | Performed by: INTERNAL MEDICINE

## 2024-09-09 RX ORDER — CITALOPRAM HYDROBROMIDE 10 MG/1
10 TABLET ORAL DAILY
Qty: 90 TABLET | Refills: 3 | Status: SHIPPED | OUTPATIENT
Start: 2024-09-09 | End: 2025-09-09

## 2024-09-09 RX ORDER — BUPROPION HYDROCHLORIDE 150 MG/1
150 TABLET ORAL EVERY MORNING
Qty: 90 TABLET | Refills: 3 | Status: SHIPPED | OUTPATIENT
Start: 2024-09-09 | End: 2025-09-09

## 2024-09-09 RX ORDER — NITROFURANTOIN 25; 75 MG/1; MG/1
100 CAPSULE ORAL 2 TIMES DAILY
Qty: 10 CAPSULE | Refills: 4 | Status: SHIPPED | OUTPATIENT
Start: 2024-09-09 | End: 2024-09-14

## 2024-09-09 RX ORDER — ALBUTEROL SULFATE 90 UG/1
AEROSOL, METERED RESPIRATORY (INHALATION)
Qty: 6.7 G | Refills: 3 | Status: SHIPPED | OUTPATIENT
Start: 2024-09-09

## 2024-09-09 ASSESSMENT — ANXIETY QUESTIONNAIRES
GAD7 TOTAL SCORE: 7
8. IF YOU CHECKED OFF ANY PROBLEMS, HOW DIFFICULT HAVE THESE MADE IT FOR YOU TO DO YOUR WORK, TAKE CARE OF THINGS AT HOME, OR GET ALONG WITH OTHER PEOPLE?: SOMEWHAT DIFFICULT
7. FEELING AFRAID AS IF SOMETHING AWFUL MIGHT HAPPEN: SEVERAL DAYS
GAD7 TOTAL SCORE: 7
GAD7 TOTAL SCORE: 7

## 2024-09-09 ASSESSMENT — ASTHMA QUESTIONNAIRES
QUESTION_5 LAST FOUR WEEKS HOW WOULD YOU RATE YOUR ASTHMA CONTROL: WELL CONTROLLED
QUESTION_2 LAST FOUR WEEKS HOW OFTEN HAVE YOU HAD SHORTNESS OF BREATH: ONCE OR TWICE A WEEK
ACT_TOTALSCORE: 20
QUESTION_4 LAST FOUR WEEKS HOW OFTEN HAVE YOU USED YOUR RESCUE INHALER OR NEBULIZER MEDICATION (SUCH AS ALBUTEROL): ONCE A WEEK OR LESS
ACT_TOTALSCORE: 20
QUESTION_1 LAST FOUR WEEKS HOW MUCH OF THE TIME DID YOUR ASTHMA KEEP YOU FROM GETTING AS MUCH DONE AT WORK, SCHOOL OR AT HOME: A LITTLE OF THE TIME
QUESTION_3 LAST FOUR WEEKS HOW OFTEN DID YOUR ASTHMA SYMPTOMS (WHEEZING, COUGHING, SHORTNESS OF BREATH, CHEST TIGHTNESS OR PAIN) WAKE YOU UP AT NIGHT OR EARLIER THAN USUAL IN THE MORNING: ONCE OR TWICE

## 2024-09-09 ASSESSMENT — PATIENT HEALTH QUESTIONNAIRE - PHQ9
SUM OF ALL RESPONSES TO PHQ QUESTIONS 1-9: 9
SUM OF ALL RESPONSES TO PHQ QUESTIONS 1-9: 9
10. IF YOU CHECKED OFF ANY PROBLEMS, HOW DIFFICULT HAVE THESE PROBLEMS MADE IT FOR YOU TO DO YOUR WORK, TAKE CARE OF THINGS AT HOME, OR GET ALONG WITH OTHER PEOPLE: SOMEWHAT DIFFICULT

## 2024-09-09 NOTE — PROGRESS NOTES
OFFICE VISIT--Video    Cheryle is a 34 year old female contacting the clinic today via video, who will use the platform: [employer] for the visit.  Phone # for [employer], or if [facility] drops:   Telephone Information:   Mobile [phone]          ASSESSMENT and PLAN:  1. Moderate episode of recurrent major depressive disorder (H)-issue of 1st Rx   Add bupropion for weight, ADD, and allow decrease in citalopram  - citalopram (CELEXA) 10 MG tablet; Take 1 tablet (10 mg) by mouth daily.  Dispense: 90 tablet; Refill: 3  - buPROPion (WELLBUTRIN XL) 150 MG 24 hr tablet; Take 1 tablet (150 mg) by mouth every morning.  Dispense: 90 tablet; Refill: 3    2. Mild intermittent asthma with (acute) exacerbation  Okay to refill  - albuterol (PROAIR HFA/PROVENTIL HFA/VENTOLIN HFA) 108 (90 Base) MCG/ACT inhaler; INHALE 1 TO 2 PUFFS INTO THE LUNGS EVERY 6 HOURS  Dispense: 6.7 g; Refill: 3    3. Generalized anxiety disorder  Add back citalopram    4. Tobacco use disorder  Trial of bupropion    5. Recurrent cystitis  Home bladder program discussed  - nitroFURantoin macrocrystal-monohydrate (MACROBID) 100 MG capsule; Take 1 capsule (100 mg) by mouth 2 times daily for 5 days.  Dispense: 10 capsule; Refill: 4  - UA Macroscopic with reflex to Microscopic and Culture; Standing    6.  Recurrent sinusitis.  Email sent and discussed viral to bacterial progression     Patient Instructions   Bupropion 150 mg daily    If side effects or only partial improvement add citalopram 10 mg daily after 2 to 3 weeks    Standing order for home bladder program initiated with standing order for UA    Refillable nitrofurantoin sent    Refill asthma inhaler    Email sent regarding conversion of viral to bacterial sinus infection, and consideration of prednisone, CT scan of sinuses, or referral for sleep apnea            Return if symptoms worsen or fail to improve, for using a video visit.       CHIEF COMPLAINT:  Chief Complaint   Patient presents with     Depression     Review/discuss medication for depression/anxiety        HISTORY OF PRESENT ILLNESS:  Cheryle is a 34 year old female contacting the clinic today via video for request to resume medication.  Medication list indicates sertraline, Prozac and citalopram.  Review of notes indicates that citalopram 20 mg was used most recently.  Does smoke lightly.  Has never tried bupropion    Also has ADHD.  Has taken different stimulants periodically.  Discussed crossover effect of bupropion and ADD and depression and discussed taking mixture of both    Has frequent bladder infection.  Discussed home bladder program.  Nitrofurantoin has worked.  Allergic to sulfa    Frequent sinus infections requiring antibiotics.  Discussed possible sleep apnea, aggressive inflammatory reaction, and structural sinus disease.  Email sent    History of Present Illness       Mental Health Follow-up:  Patient presents to follow-up on Depression & Anxiety.Patient's depression since last visit has been:  Bad  The patient is having other symptoms associated with depression.  Patient's anxiety since last visit has been:  Bad  The patient is having other symptoms associated with anxiety.  Any significant life events: relationship concerns, job concerns and financial concerns  Patient is feeling anxious or having panic attacks.  Patient has no concerns about alcohol or drug use.    She eats 0-1 servings of fruits and vegetables daily.She consumes 1 sweetened beverage(s) daily.She exercises with enough effort to increase her heart rate 60 or more minutes per day.  She exercises with enough effort to increase her heart rate 5 days per week.   She is taking medications regularly.      REVIEW OF SYSTEMS:         Today's PHQ-2 Score:       11/18/2021    11:58 AM   PHQ-2 ( 1999 [employer])   Q1: Little interest or pleasure in doing things 0   Q2: Feeling down, depressed or hopeless 0   PHQ-2 Score 0   Q1: Little interest or pleasure in doing things Not at  "all   Q2: Feeling down, depressed or hopeless Not at all   PHQ-2 Score 0       PFSH:  Social History     Social History Narrative    Not on file       TOBACCO USE:  History   Smoking Status    Former    Packs/day: 0.25    Years: 0.50    Types: Cigarettes   Smokeless Tobacco    Former       VITALS:  There were no vitals filed for this visit.  LMP  (LMP Unknown)  Estimated body mass index is 49.95 kg/m  as calculated from the following:    Height as of 4/22/22: 1.626 m (5' 4\").    Weight as of 1/30/24: 132 kg (291 lb).    PHYSICAL EXAM:  (observations via Video)  Alert and oriented.  Overweight.    MEDICATIONS:   Current Outpatient Medications   Medication Sig Dispense Refill    albuterol (PROAIR HFA/PROVENTIL HFA/VENTOLIN HFA) 108 (90 Base) MCG/ACT inhaler INHALE 1 TO 2 PUFFS INTO THE LUNGS EVERY 6 HOURS 6.7 g 3    albuterol (PROVENTIL) (2.5 MG/3ML) 0.083% neb solution Take 1 vial (2.5 mg) by nebulization every 6 hours as needed for shortness of breath or wheezing 90 mL 0    buPROPion (WELLBUTRIN XL) 150 MG 24 hr tablet Take 1 tablet (150 mg) by mouth every morning. 90 tablet 3    citalopram (CELEXA) 10 MG tablet Take 1 tablet (10 mg) by mouth daily. 90 tablet 3    nitroFURantoin macrocrystal-monohydrate (MACROBID) 100 MG capsule Take 1 capsule (100 mg) by mouth 2 times daily for 5 days. 10 capsule 4       Outside Notes summarized: Medicine list reviewed with frequent antibiotics  Labs, x-rays, cardiology, GI tests reviewed:   No results for input(s): \"HGB\", \"WBC\", \"NA\", \"POTASSIUM\", \"CR\", \"A1C\", \"PSA\", \"URIC\", \"B12\", \"TSH\", \"VITDT\", \"SED\", \"CRPI\" in the last 55988 hours.  Lab Results   Component Value Date    [id number] Negative 05/27/2022     No results found for: \"CHOL\"  New orders:   Orders Placed This Encounter   Procedures    UA Macroscopic with reflex to Microscopic and Culture       Independent review of:   Patient would like to receive their AVS by MyChart    Video-Visit Details  Type of service:  Video " Visit      9/9/2024     2:59 PM   Additional Questions   Roomed by EN Cian   Accompanied by alone     Patient has given verbal consent to a Video visit?  Yes  How would you like to obtain your AVS?  MyChart  Will anyone else be joining your video visit, giving supplemental history? No    Originating location (pt location): Home    Distant Location (provider location):  Off-site    Video Start Time: 3:03 PM  Video End time:  3:34 PM  Face to face plus orders: 31 minutes  Documentation time:  3 minutes     The visit lasted a total of 34 minutes    James Be MD  Internal Medicine  [facility]

## 2024-09-09 NOTE — PROGRESS NOTES
Cheryle is a 34 year old who is being evaluated via a billable video visit.    How would you like to obtain your AVS? MyChart  If the video visit is dropped, the invitation should be resent by: Text to cell phone: [phone]  Will anyone else be joining your video visit? No  {If patient encounters technical issues they should call [phone] :624966}    {PROVIDER CHARTING PREFERENCE:437484}    Subjective   Cheryle is a 34 year old, presenting for the following health issues:  Depression (Review/discuss medication for depression/anxiety )      9/9/2024     2:59 PM   Additional Questions   Roomed by EN Cain   Accompanied by alone     Video Start Time: {video visit start/end time for provider to select:633842}    HPI   {ALERT  Recent PHQ-9/EPDS score indicates suicidal ideations, document follow-up within the A/P section of the note :926738}{Provider Documentation  Link to C-SSRS (Brief [place]) Flowsheet :107838}  {SUPERLIST (Optional):516864}  {additonal problems for provider to add (Optional):647113}    {ROS Picklists (Optional):898129}      Objective           Vitals:  No vitals were obtained today due to virtual visit.    Physical Exam   {video visit exam brief selected:975516}    {Diagnostic Test Results (Optional):670118}      Video-Visit Details    Type of service:  Video Visit   Video End Time:{video visit start/end time for provider to select:079470}  Originating Location (pt. Location): Home  {PROVIDER LOCATION On-site should be selected for visits conducted from your clinic location or adjoining [facility] hospital, academic office, or other nearby [facility] building. Off-site should be selected for all other provider locations, including home:719937}  Distant Location (provider location):  Off-site  Platform used for Video Visit: Ari  Signed Electronically by: James Be MD  {Email feedback regarding this note to [email or web address]   :846050}

## 2024-09-09 NOTE — PATIENT INSTRUCTIONS
Bupropion 150 mg daily    If side effects or only partial improvement add citalopram 10 mg daily after 2 to 3 weeks    Standing order for home bladder program initiated with standing order for UA    Refillable nitrofurantoin sent    Refill asthma inhaler    Email sent regarding conversion of viral to bacterial sinus infection, and consideration of prednisone, CT scan of sinuses, or referral for sleep apnea

## 2024-09-17 NOTE — LETTER
"    12/17/2021         RE: Cheryle Vazquez  [address]  [place] [address]        Dear Colleague,    Thank you for referring your patient, Cheryle Vazquez, to the [facility] [place]. Please see a copy of my visit note below.    It was a pleasure to see Cheryle Vazquez today in Neurosurgery Clinic. She is a 31 year old female who is recently seen for her right L5-S1 disc herniation with radiculopathy.  Please see my note from December 10 for the full details of her illness.    She continues to have symptoms in the right lower extremity consistent with S1 radiculopathy.  These have not improved.    She is hesitant to undergo an epidural steroid injection.    Vitals:    12/17/21 1345   BP: 126/84   Pulse: 110   SpO2: 97%   Weight: 149.7 kg (330 lb)     Estimated body mass index is 56.64 kg/m  as calculated from the following:    Height as of 11/19/21: 1.626 m (5' 4\").    Weight as of this encounter: 149.7 kg (330 lb).  Severe Pain (6)    Exam stable, consistent with previous exam.      Imaging: MRI of the lumbar spine demonstrates a large herniated disc fragment on the right at L5-S1.  The imaging was reviewed with the patient shown to the patient in clinic today.    Assessment: Right L5-S1 herniated disc with radiculopathy.    Plan: I think a right L5-S1 microdiscectomy would be a reasonable course of action.The risks benefits and alternatives to the procedure were discussed. Risks include, but are not limited to, bleeding, infection, CSF leak, neurologic injury such as spinal cord or nerve root injury, need for further surgery, failure for symptoms to improve. Questions were solicited and answered, and the patient wishes to proceed with surgery.             Again, thank you for allowing me to participate in the care of your patient.        Sincerely,        Andrzej Hwang MD    " ----- Message from Fernando Parker sent at 9/17/2024  7:50 AM CDT -----  Regarding: return call  Contact: patient  Type:  Patient Returning Call    Who Called:patient  Who Left Message for Patient:office nurse  Does the patient know what this is regarding?:labs orders  Would the patient rather a call back or a response via [facility]?   Best Call Back Number:[phone]  Additional Information:

## 2024-11-10 ENCOUNTER — OFFICE VISIT (OUTPATIENT)
Dept: URGENT CARE | Facility: URGENT CARE | Age: 34
End: 2024-11-10
Payer: COMMERCIAL

## 2024-11-10 VITALS
DIASTOLIC BLOOD PRESSURE: 75 MMHG | RESPIRATION RATE: 22 BRPM | WEIGHT: 290 LBS | OXYGEN SATURATION: 98 % | HEART RATE: 84 BPM | SYSTOLIC BLOOD PRESSURE: 129 MMHG | TEMPERATURE: 98.1 F | BODY MASS INDEX: 49.78 KG/M2

## 2024-11-10 DIAGNOSIS — J06.9 VIRAL UPPER RESPIRATORY TRACT INFECTION WITH COUGH: Primary | ICD-10-CM

## 2024-11-10 PROCEDURE — 99213 OFFICE O/P EST LOW 20 MIN: CPT | Performed by: PHYSICIAN ASSISTANT

## 2024-11-10 PROCEDURE — 87635 SARS-COV-2 COVID-19 AMP PRB: CPT | Performed by: PHYSICIAN ASSISTANT

## 2024-11-10 NOTE — LETTER
November 10, 2024      Cheryle Vazquez  [address]  [facility] [place] [address]        To Whom It May Concern:    Cheryle Vazquez was seen in our clinic today.       Sincerely,      Alissa Santana

## 2024-11-11 LAB — SARS-COV-2 RNA RESP QL NAA+PROBE: NEGATIVE

## 2024-11-11 NOTE — PATIENT INSTRUCTIONS
Patient was educated on the natural course of condition. COVID PCR is pending. Conservative measures discussed including rest, increased fluids, humidifier, and over-the-counter cough suppressants. See your primary care provider if symptoms do not improve in 7 days. Seek emergency care if you develop shortness of breath or fever over 103.

## 2024-11-11 NOTE — PROGRESS NOTES
URGENT CARE VISIT:    SUBJECTIVE:   Cheryle Vazquez is a 34 year old female presenting with a chief complaint of cough - productive.  Onset was 6 day(s) ago.   She denies the following symptoms: shortness of breath, vomiting, and diarrhea  Course of illness is same.    Treatment measures tried include Inhaler (name: albuterol) with some relief of symptoms.  Predisposing factors include None.    PMH:   Past Medical History:   Diagnosis Date    Acid reflux     ADHD (attention deficit hyperactivity disorder)     Depressive disorder     Eczema      Allergies: Amoxicillin, Azithromycin, Sulfamethoxazole-trimethoprim, and Valacyclovir   Medications:   Current Outpatient Medications   Medication Sig Dispense Refill    albuterol (PROAIR HFA/PROVENTIL HFA/VENTOLIN HFA) 108 (90 Base) MCG/ACT inhaler INHALE 1 TO 2 PUFFS INTO THE LUNGS EVERY 6 HOURS 6.7 g 3    buPROPion (WELLBUTRIN XL) 150 MG 24 hr tablet Take 1 tablet (150 mg) by mouth every morning. 90 tablet 3    citalopram (CELEXA) 10 MG tablet Take 1 tablet (10 mg) by mouth daily. 90 tablet 3    albuterol (PROVENTIL) (2.5 MG/3ML) 0.083% neb solution Take 1 vial (2.5 mg) by nebulization every 6 hours as needed for shortness of breath or wheezing (Patient not taking: Reported on 11/10/2024) 90 mL 0     Social History:   Social History     Tobacco Use    Smoking status: Former     Current packs/day: 0.25     Average packs/day: 0.3 packs/day for 0.5 years (0.1 ttl pk-yrs)     Types: Cigarettes    Smokeless tobacco: Former   Substance Use Topics    Alcohol use: Yes     Comment: minimal       ROS:  Review of systems negative except as stated above.    OBJECTIVE:  /75   Pulse 84   Temp 98.1  F (36.7  C)   Resp 22   Wt 131.5 kg (290 lb)   LMP  (LMP Unknown)   SpO2 98%   BMI 49.78 kg/m    GENERAL APPEARANCE: healthy, alert and no distress  EYES: EOMI,  PERRL, conjunctiva clear  HENT: ear canals and TM's normal.  Nose and mouth without ulcers, erythema or  lesions  NECK: supple, nontender, no lymphadenopathy  RESP: lungs clear to auscultation - no rales, rhonchi or wheezes  CV: regular rates and rhythm, normal S1 S2, no murmur noted  SKIN: no suspicious lesions or rashes      ASSESSMENT:    ICD-10-CM    1. Viral upper respiratory tract infection with cough  J06.9 Symptomatic COVID-19 Virus (Coronavirus) by PCR Nasopharyngeal          PLAN:  Patient Instructions   Patient was educated on the natural course of condition. COVID PCR is pending. Conservative measures discussed including rest, increased fluids, humidifier, and over-the-counter cough suppressants. See your primary care provider if symptoms do not improve in 7 days. Seek emergency care if you develop shortness of breath or fever over 103.    Patient verbalized understanding and is agreeable to plan. The patient was discharged ambulatory and in stable condition.    DONALD Nuñez...................  11/10/2024   6:41 PM

## 2025-03-17 NOTE — PROGRESS NOTES
Call placed to patient and advised.  Patient verbalized understanding.  Orders placed and routed to Dr. Jj Bernard for signature.   Cheryle is a 33 year old who is being evaluated via a billable telephone visit.      What phone number would you like to be contacted at? [phone]  How would you like to obtain your AVS? Dale    Distant Location (provider location):  On-site    Subjective   Cheryle is a 33 year old, presenting for the following health issues:  Covid Concern      HPI       COVID-19 Symptom Review  How many days ago did these symptoms start? 12/26/2023    Are any of the following symptoms significant for you?  New or worsening difficulty breathing? Yes  Please describe what kind of difficulty you are having breathing:Mild dyspnea (able to do ADLs without difficulty, mild shortness of breath with activities such as climbing one or two flights of stairs or walking briskly)  Worsening cough? Yes, I am coughing up mucus.  Fever or chills? Yes, I felt feverish or had chills.  Headache: No  Sore throat: YES  Chest pain: No  Diarrhea: No  Body aches? YES    What treatments has patient tried? Decongestant - oral   Does patient live in a nursing home, group home, or shelter? No  Does patient have a way to get food/medications during quarantined? Yes, I have a friend or family member who can help me.              Review of Systems   Constitutional, HEENT, cardiovascular, pulmonary, GI, , musculoskeletal, neuro, skin, endocrine and psych systems are negative, except as otherwise noted.      Objective           Vitals:  No vitals were obtained today due to virtual visit.    Physical Exam   healthy, alert, and no distress  PSYCH: Alert and oriented times 3; coherent speech, normal   rate and volume, able to articulate logical thoughts, able   to abstract reason, no tangential thoughts, no hallucinations   or delusions  Her affect is normal  RESP: No cough, no audible wheezing, able to talk in full sentences  Remainder of exam unable to be completed due to telephone visits      (U07.1) Infection due to 2019 novel coronavirus  (primary  encounter diagnosis)  Comment: We discussed options for management and will start Paxlovid to complete 5-day course.  Medication reviewed as well as side effects and drug drug interactions.  No additional concerns.  She is not taking any other medications.  Prescription for albuterol sent to her pharmacy  Plan: nirmatrelvir and ritonavir (PAXLOVID) 300         mg/100 mg therapy pack            (J45.21) Mild intermittent asthma with (acute) exacerbation  Comment: As above  Plan: albuterol (PROAIR HFA/PROVENTIL HFA/VENTOLIN         HFA) 108 (90 Base) MCG/ACT inhaler                   Phone call duration: 6 minutes

## 2025-03-21 PROBLEM — J45.50 SEVERE PERSISTENT ASTHMA WITHOUT COMPLICATION (H): Status: ACTIVE | Noted: 2025-03-21

## 2025-07-12 ENCOUNTER — HEALTH MAINTENANCE LETTER (OUTPATIENT)
Age: 35
End: 2025-07-12

## (undated) DEVICE — POSITIONER PT PRONESAFE HEAD REST W/DERMAPROX INSERT 40599

## (undated) DEVICE — SURGICEL HEMOSTAT 2X3" 1953

## (undated) DEVICE — GLOVE PROTEXIS W/NEU-THERA 8.5  2D73TE85

## (undated) DEVICE — SOL RINGERS LACTATED 1000ML BAG 2B2324X

## (undated) DEVICE — GLOVE PROTEXIS MICRO 7.5  2D73PM75

## (undated) DEVICE — DRSG KERLIX FLUFFS X5

## (undated) DEVICE — GLOVE PROTEXIS W/NEU-THERA 8.0  2D73TE80

## (undated) DEVICE — TAPE MEDIPORE 6"X10YD 2966

## (undated) DEVICE — DRAPE SHEET REV FOLD 3/4 9349

## (undated) DEVICE — SOL NACL 0.9% INJ 250ML BAG 2B1322Q

## (undated) DEVICE — SUCTION MANIFOLD NEPTUNE SGL

## (undated) DEVICE — PACK UNIVERSAL SPLIT 29131

## (undated) DEVICE — DRSG GAUZE 4X4" 3033

## (undated) DEVICE — GLOVE PROTEXIS BLUE W/NEU-THERA 8.0  2D73EB80

## (undated) DEVICE — SU VICRYL 2-0 CT-2 CR 8X18" J726D

## (undated) DEVICE — ESU GROUND PAD UNIVERSAL W/O CORD

## (undated) DEVICE — LINEN TOWEL PACK X5 5464

## (undated) DEVICE — SOL NACL 0.9% INJ 1000ML BAG 2B1324X

## (undated) DEVICE — ESU PENCIL W/HOLSTER E2350H

## (undated) DEVICE — GOWN XXLG REINFORCED 9071EL

## (undated) DEVICE — SOL WATER IRRIG 1000ML BOTTLE 2F7114

## (undated) DEVICE — DECANTER VIAL 2006S

## (undated) DEVICE — PREP CHLORAPREP 26ML TINTED ORANGE  260815

## (undated) DEVICE — NDL SPINAL 18GA 3.5" 405184

## (undated) DEVICE — PACK SPINE SM CUSTOM SNE15SSFSK

## (undated) DEVICE — PREP DURAPREP 26ML APL 8630

## (undated) DEVICE — SU MONOCRYL 4-0 PS-2 18" UND Y496G

## (undated) DEVICE — RX SURGIFLO HEMOSTATIC MATRIX 10ML 199102S

## (undated) DEVICE — DRAPE MAYO STAND 23X54 8337

## (undated) DEVICE — SU VICRYL 0 UR-6 27" J603H

## (undated) DEVICE — SPONGE SURGIFOAM 50

## (undated) DEVICE — DRAPE C-ARM 60X42" 1013

## (undated) DEVICE — MANIFOLD NEPTUNE 4 PORT 700-20

## (undated) DEVICE — SYR BULB IRRIG DOVER 60 ML LATEX FREE 67000

## (undated) DEVICE — ESU ELEC BLADE 2.75" COATED/INSULATED E1455

## (undated) DEVICE — DRAPE MICROSCOPE LEICA 54X150" AR8033650

## (undated) DEVICE — SU VICRYL 0 CT-1 CR 8X18" J740D

## (undated) DEVICE — SPONGE COTTONOID 1/2X3" 80-1407

## (undated) RX ORDER — ONDANSETRON 2 MG/ML
INJECTION INTRAMUSCULAR; INTRAVENOUS
Status: DISPENSED
Start: 2022-01-20

## (undated) RX ORDER — FENTANYL CITRATE 50 UG/ML
INJECTION, SOLUTION INTRAMUSCULAR; INTRAVENOUS
Status: DISPENSED
Start: 2022-01-20

## (undated) RX ORDER — OXYCODONE HYDROCHLORIDE 5 MG/1
TABLET ORAL
Status: DISPENSED
Start: 2022-01-20

## (undated) RX ORDER — HYDROMORPHONE HYDROCHLORIDE 1 MG/ML
INJECTION, SOLUTION INTRAMUSCULAR; INTRAVENOUS; SUBCUTANEOUS
Status: DISPENSED
Start: 2022-01-20

## (undated) RX ORDER — GLYCOPYRROLATE 0.2 MG/ML
INJECTION, SOLUTION INTRAMUSCULAR; INTRAVENOUS
Status: DISPENSED
Start: 2022-01-20

## (undated) RX ORDER — CLINDAMYCIN PHOSPHATE 900 MG/50ML
INJECTION, SOLUTION INTRAVENOUS
Status: DISPENSED
Start: 2022-01-20

## (undated) RX ORDER — DEXMEDETOMIDINE HYDROCHLORIDE 4 UG/ML
INJECTION, SOLUTION INTRAVENOUS
Status: DISPENSED
Start: 2022-01-20

## (undated) RX ORDER — DEXAMETHASONE SODIUM PHOSPHATE 4 MG/ML
INJECTION, SOLUTION INTRA-ARTICULAR; INTRALESIONAL; INTRAMUSCULAR; INTRAVENOUS; SOFT TISSUE
Status: DISPENSED
Start: 2022-01-20

## (undated) RX ORDER — PROPOFOL 10 MG/ML
INJECTION, EMULSION INTRAVENOUS
Status: DISPENSED
Start: 2022-01-20

## (undated) RX ORDER — METHOCARBAMOL 750 MG/1
TABLET, FILM COATED ORAL
Status: DISPENSED
Start: 2022-01-20

## (undated) RX ORDER — LIDOCAINE HYDROCHLORIDE 20 MG/ML
INJECTION, SOLUTION EPIDURAL; INFILTRATION; INTRACAUDAL; PERINEURAL
Status: DISPENSED
Start: 2022-01-20

## (undated) RX ORDER — NEOSTIGMINE METHYLSULFATE 1 MG/ML
VIAL (ML) INJECTION
Status: DISPENSED
Start: 2022-01-20